# Patient Record
Sex: MALE | Race: WHITE | NOT HISPANIC OR LATINO | Employment: UNEMPLOYED | URBAN - METROPOLITAN AREA
[De-identification: names, ages, dates, MRNs, and addresses within clinical notes are randomized per-mention and may not be internally consistent; named-entity substitution may affect disease eponyms.]

---

## 2021-09-21 ENCOUNTER — OFFICE VISIT (OUTPATIENT)
Dept: FAMILY MEDICINE | Facility: CLINIC | Age: 36
End: 2021-09-21
Payer: MEDICAID

## 2021-09-21 VITALS
OXYGEN SATURATION: 98 % | SYSTOLIC BLOOD PRESSURE: 122 MMHG | DIASTOLIC BLOOD PRESSURE: 92 MMHG | BODY MASS INDEX: 25.6 KG/M2 | TEMPERATURE: 99 F | WEIGHT: 189 LBS | HEIGHT: 72 IN | HEART RATE: 110 BPM

## 2021-09-21 DIAGNOSIS — N50.89 TESTICULAR SWELLING: Primary | ICD-10-CM

## 2021-09-21 DIAGNOSIS — F32.A DEPRESSION, UNSPECIFIED DEPRESSION TYPE: ICD-10-CM

## 2021-09-21 DIAGNOSIS — R60.0 BILATERAL LOWER EXTREMITY EDEMA: ICD-10-CM

## 2021-09-21 DIAGNOSIS — F10.10 ETOH ABUSE: ICD-10-CM

## 2021-09-21 DIAGNOSIS — F51.01 PRIMARY INSOMNIA: ICD-10-CM

## 2021-09-21 DIAGNOSIS — F41.9 ANXIETY: ICD-10-CM

## 2021-09-21 PROCEDURE — 99204 PR OFFICE/OUTPT VISIT, NEW, LEVL IV, 45-59 MIN: ICD-10-PCS | Mod: S$GLB,,, | Performed by: STUDENT IN AN ORGANIZED HEALTH CARE EDUCATION/TRAINING PROGRAM

## 2021-09-21 PROCEDURE — 99204 OFFICE O/P NEW MOD 45 MIN: CPT | Mod: S$GLB,,, | Performed by: STUDENT IN AN ORGANIZED HEALTH CARE EDUCATION/TRAINING PROGRAM

## 2021-09-21 RX ORDER — ONDANSETRON HYDROCHLORIDE 8 MG/1
8 TABLET, FILM COATED ORAL EVERY 8 HOURS PRN
Status: ON HOLD | COMMUNITY
Start: 2021-09-08 | End: 2022-08-06

## 2021-09-21 RX ORDER — PANTOPRAZOLE SODIUM 40 MG/1
40 TABLET, DELAYED RELEASE ORAL DAILY
Status: ON HOLD | COMMUNITY
Start: 2021-09-08 | End: 2022-08-06

## 2021-09-22 RX ORDER — HYDROXYZINE HYDROCHLORIDE 25 MG/1
25 TABLET, FILM COATED ORAL NIGHTLY PRN
Qty: 30 TABLET | Refills: 1 | Status: SHIPPED | OUTPATIENT
Start: 2021-09-22 | End: 2021-10-05 | Stop reason: SDUPTHER

## 2021-09-22 RX ORDER — PAROXETINE 10 MG/1
10 TABLET, FILM COATED ORAL EVERY MORNING
Qty: 30 TABLET | Refills: 1 | Status: SHIPPED | OUTPATIENT
Start: 2021-09-22 | End: 2021-10-05 | Stop reason: SDUPTHER

## 2021-10-05 ENCOUNTER — OFFICE VISIT (OUTPATIENT)
Dept: FAMILY MEDICINE | Facility: CLINIC | Age: 36
End: 2021-10-05
Payer: MEDICAID

## 2021-10-05 VITALS
BODY MASS INDEX: 21.26 KG/M2 | HEIGHT: 72 IN | DIASTOLIC BLOOD PRESSURE: 76 MMHG | RESPIRATION RATE: 18 BRPM | OXYGEN SATURATION: 98 % | HEART RATE: 85 BPM | TEMPERATURE: 99 F | SYSTOLIC BLOOD PRESSURE: 124 MMHG | WEIGHT: 157 LBS

## 2021-10-05 DIAGNOSIS — Z79.899 ON LONG TERM DRUG THERAPY: ICD-10-CM

## 2021-10-05 DIAGNOSIS — F41.9 ANXIETY: ICD-10-CM

## 2021-10-05 DIAGNOSIS — Z13.220 SCREENING, LIPID: ICD-10-CM

## 2021-10-05 DIAGNOSIS — K70.30 ALCOHOLIC CIRRHOSIS, UNSPECIFIED WHETHER ASCITES PRESENT: ICD-10-CM

## 2021-10-05 DIAGNOSIS — F32.A DEPRESSION, UNSPECIFIED DEPRESSION TYPE: Primary | ICD-10-CM

## 2021-10-05 DIAGNOSIS — Z23 IMMUNIZATION DUE: ICD-10-CM

## 2021-10-05 PROCEDURE — 90471 FLU VACCINE - QUADRIVALENT (RECOMBINANT) PRESERVATIVE FREE: ICD-10-PCS | Mod: S$GLB,,, | Performed by: STUDENT IN AN ORGANIZED HEALTH CARE EDUCATION/TRAINING PROGRAM

## 2021-10-05 PROCEDURE — 99214 PR OFFICE/OUTPT VISIT, EST, LEVL IV, 30-39 MIN: ICD-10-PCS | Mod: 25,S$GLB,, | Performed by: STUDENT IN AN ORGANIZED HEALTH CARE EDUCATION/TRAINING PROGRAM

## 2021-10-05 PROCEDURE — 99214 OFFICE O/P EST MOD 30 MIN: CPT | Mod: 25,S$GLB,, | Performed by: STUDENT IN AN ORGANIZED HEALTH CARE EDUCATION/TRAINING PROGRAM

## 2021-10-05 PROCEDURE — 90682 FLU VACCINE - QUADRIVALENT (RECOMBINANT) PRESERVATIVE FREE: ICD-10-PCS | Mod: S$GLB,,, | Performed by: STUDENT IN AN ORGANIZED HEALTH CARE EDUCATION/TRAINING PROGRAM

## 2021-10-05 PROCEDURE — 90682 RIV4 VACC RECOMBINANT DNA IM: CPT | Mod: S$GLB,,, | Performed by: STUDENT IN AN ORGANIZED HEALTH CARE EDUCATION/TRAINING PROGRAM

## 2021-10-05 PROCEDURE — 90471 IMMUNIZATION ADMIN: CPT | Mod: S$GLB,,, | Performed by: STUDENT IN AN ORGANIZED HEALTH CARE EDUCATION/TRAINING PROGRAM

## 2021-10-05 RX ORDER — HYDROXYZINE HYDROCHLORIDE 25 MG/1
25 TABLET, FILM COATED ORAL 2 TIMES DAILY PRN
Qty: 60 TABLET | Refills: 3 | Status: ON HOLD | OUTPATIENT
Start: 2021-10-05 | End: 2022-08-06

## 2021-10-05 RX ORDER — PAROXETINE 10 MG/1
10 TABLET, FILM COATED ORAL EVERY MORNING
Qty: 30 TABLET | Refills: 2 | Status: ON HOLD | OUTPATIENT
Start: 2021-10-05 | End: 2022-08-06

## 2021-10-06 LAB
ABS NRBC COUNT: 0 X 10 3/UL (ref 0–0.01)
ABSOLUTE BASOPHIL: 0.07 X 10 3/UL (ref 0–0.22)
ABSOLUTE EOSINOPHIL: 0.12 X 10 3/UL (ref 0.04–0.54)
ABSOLUTE IMMATURE GRAN: 0.01 X 10 3/UL (ref 0–0.04)
ABSOLUTE LYMPHOCYTE: 0.98 X 10 3/UL (ref 0.86–4.75)
ABSOLUTE MONOCYTE: 0.35 X 10 3/UL (ref 0.22–1.08)
ALBUMIN SERPL-MCNC: 3.2 G/DL (ref 3.5–5.2)
ALBUMIN/GLOB SERPL ELPH: 0.7 {RATIO} (ref 1–2.7)
ALP ISOS SERPL LEV INH-CCNC: 128 U/L (ref 40–130)
ALT (SGPT): 33 U/L (ref 0–41)
ANION GAP SERPL CALC-SCNC: 12 MMOL/L (ref 8–17)
AST SERPL-CCNC: 78 U/L (ref 0–40)
BASOPHILS NFR BLD: 1.3 % (ref 0.2–1.2)
BILIRUBIN, TOTAL: 5.61 MG/DL (ref 0–1.2)
BUN/CREAT SERPL: 10.8 (ref 6–20)
CALCIUM SERPL-MCNC: 8.7 MG/DL (ref 8.6–10.2)
CARBON DIOXIDE, CO2: 24 MMOL/L (ref 22–29)
CHLORIDE: 103 MMOL/L (ref 98–107)
CHOLEST SERPL-MSCNC: 234 MG/DL (ref 100–200)
CREAT SERPL-MCNC: 0.74 MG/DL (ref 0.7–1.2)
EOSINOPHIL NFR BLD: 2.2 % (ref 0.7–7)
GFR ESTIMATION: 120.36
GLOBULIN: 4.3 G/DL (ref 1.5–4.5)
GLUCOSE: 87 MG/DL (ref 74–106)
HCT VFR BLD AUTO: 34.2 % (ref 42–52)
HDLC SERPL-MCNC: 77 MG/DL
HGB BLD-MCNC: 11.3 G/DL (ref 14–18)
IMMATURE GRANULOCYTES: 0.2 % (ref 0–0.5)
LDL/HDL RATIO: 1.8 (ref 1–3)
LDLC SERPL CALC-MCNC: 141 MG/DL (ref 0–100)
LYMPHOCYTES NFR BLD: 17.9 % (ref 19.3–53.1)
MCH RBC QN AUTO: 32.1 PG (ref 27–32)
MCHC RBC AUTO-ENTMCNC: 33 G/DL (ref 32–36)
MCV RBC AUTO: 97.2 FL (ref 80–94)
MONOCYTES NFR BLD: 6.4 % (ref 4.7–12.5)
NEUTROPHILS # BLD AUTO: 3.96 X 10 3/UL (ref 2.15–7.56)
NEUTROPHILS NFR BLD: 72 % (ref 34–71.1)
NUCLEATED RED BLOOD CELLS: 0 /100 WBC (ref 0–0.2)
PLATELET # BLD AUTO: 134 X 10 3/UL (ref 135–400)
POTASSIUM: 3.7 MMOL/L (ref 3.5–5.1)
PROT SNV-MCNC: 7.5 G/DL (ref 6.4–8.3)
RBC # BLD AUTO: 3.52 X 10 6/UL (ref 4.7–6.1)
RDW-SD: 64.4 FL (ref 37–54)
SODIUM: 139 MMOL/L (ref 136–145)
T4, FREE: 1.1 NG/DL (ref 0.93–1.7)
TRIGL SERPL-MCNC: 80 MG/DL (ref 0–150)
TSH SERPL DL<=0.005 MIU/L-ACNC: 4.28 UIU/ML (ref 0.27–4.2)
UREA NITROGEN (BUN): 8 MG/DL (ref 6–20)
WBC # BLD: 5.49 X 10 3/UL (ref 4.3–10.8)

## 2021-10-08 DIAGNOSIS — K70.30 ALCOHOLIC CIRRHOSIS, UNSPECIFIED WHETHER ASCITES PRESENT: Primary | ICD-10-CM

## 2022-08-04 NOTE — PROVIDER TRANSFER
Outside Transfer Acceptance Note / [facility] Referral Center    Referring facility: [facility]   Referring provider: DURAN DE LA PAZ  Accepting facility: [facility]  Accepting provider: HIMA BRADFORD  Reason for transfer: Higher Level of Care   Transfer diagnosis: acute liver failure  Transfer specialty requested: Hepatology  Transfer specialty notified: yes  Transfer level: NUMBER 1-5: 2  Bed type requested: stepdown  Isolation status: No active isolations   Admission class or status: IP- Inpatient      Narrative     36-year-old male with a history of alcohol abuse, cirrhosis, pancreatitis, and prior variceal bleed admitted to [facility] ([facility]) on August 3 with hematemesis.  He was transferred there from an outside facility after being found hypotensive with hematemesis and significant anemia (hemoglobin around 2.6).  He received packed red blood cells, FFP, Protonix infusion, and octreotide infusion.  He was transferred to [facility] where he was seen by Gastroenterology.  No hematemesis noted since he has been at [facility].  EGD showed large esophageal varices with successful band ligation.  Stomach and duodenum were unremarkable.  He remains on aztreonam, Flagyl, lactulose, octreotide infusion, Protonix, Carafate, Xifaxan.  Referring provider noted patient is awake but confused/disoriented.  He is receiving lactulose.  He is in a telemetry bed and was felt to be hemodynamically stable for ground transfer at present. Referring provider spoke with Hepatology at [facility].  Family noted patient has recent alcohol intake.  Requesting transfer for further evaluation of decompensated cirrhosis and recent GI bleed with varices.    August 4:  COVID negative  INR 4.4, ammonia 57, white blood cells 13.4, hemoglobin 7.1, hematocrit 21.9, platelets 72, , , total bilirubin 12.1, sodium 131, potassium 4.1, chloride 100, CO2 28, BUN 58, creatinine 1.38, glucose  135 August 3:  White blood cells 20, hemoglobin 6.1, hematocrit 18.7, platelets 76, INR 3.2, sodium 130, potassium 4.5, chloride 98, CO2 19, BUN 52, creatinine 1.44, glucose 120, total bilirubin 10.4, AST 1275,     Objective     Vitals:   Temperature 98°, pulse 98, respirations 20, blood pressure 105/74, oxygen saturation 99%  Allergies:   Review of patient's allergies indicates:   Allergen Reactions    Ampicillin     Ceclor [cefaclor]     Penicillins       NPO: No    Anticoagulation:   Anticoagulants     None           Instructions    1. Admit to Hospital Medicine  2. Consult Hepatology    Upon patient arrival to floor, please send SecureChat to [facility] or call extension [phone] (if no answer, do NOT leave a callback number after the beep, rather please send a SecureChat to [facility]), for Hospital Medicine admit team assignment and for additional admit orders for the patient.  Do not page the attending physician associated with the patient on arrival (this physician may not be on duty at the time of arrival).  Rather, always send a SecureChat to [facility] or call [phone] to reach the triage physician for orders and team assignment.    JALIL Balbuena MD  Hospital Medicine Staff  Cell: [phone]

## 2022-08-05 ENCOUNTER — HOSPITAL ENCOUNTER (INPATIENT)
Facility: HOSPITAL | Age: 37
LOS: 3 days | Discharge: HOME | DRG: 432 | End: 2022-08-08
Attending: INTERNAL MEDICINE | Admitting: INTERNAL MEDICINE
Payer: MEDICAID

## 2022-08-05 DIAGNOSIS — R07.9 CHEST PAIN: ICD-10-CM

## 2022-08-05 DIAGNOSIS — K72.90 DECOMPENSATION OF CIRRHOSIS OF LIVER: ICD-10-CM

## 2022-08-05 DIAGNOSIS — K74.60 DECOMPENSATION OF CIRRHOSIS OF LIVER: ICD-10-CM

## 2022-08-05 DIAGNOSIS — R94.31 PROLONGED Q-T INTERVAL ON ECG: ICD-10-CM

## 2022-08-05 PROBLEM — K76.82 HEPATIC ENCEPHALOPATHY: Status: ACTIVE | Noted: 2022-08-05

## 2022-08-05 PROBLEM — D62 ACUTE POSTHEMORRHAGIC ANEMIA: Status: ACTIVE | Noted: 2022-08-05

## 2022-08-05 PROBLEM — D68.9 COAGULOPATHY: Status: ACTIVE | Noted: 2022-08-05

## 2022-08-05 PROBLEM — D69.6 THROMBOCYTOPENIA: Status: ACTIVE | Noted: 2022-08-05

## 2022-08-05 PROBLEM — I85.01 BLEEDING ESOPHAGEAL VARICES: Status: ACTIVE | Noted: 2022-08-05

## 2022-08-05 LAB
ABO + RH BLD: NORMAL
AFP SERPL-MCNC: <2 NG/ML (ref 0–8.4)
ALBUMIN SERPL BCP-MCNC: 2.1 G/DL (ref 3.5–5.2)
ALP SERPL-CCNC: 71 U/L (ref 55–135)
ALT SERPL W/O P-5'-P-CCNC: 403 U/L (ref 10–44)
AMMONIA PLAS-SCNC: 41 UMOL/L (ref 10–50)
ANION GAP SERPL CALC-SCNC: 3 MMOL/L (ref 8–16)
AST SERPL-CCNC: 535 U/L (ref 10–40)
BASOPHILS # BLD AUTO: 0.01 K/UL (ref 0–0.2)
BASOPHILS NFR BLD: 0.1 % (ref 0–1.9)
BILIRUB SERPL-MCNC: 14.7 MG/DL (ref 0.1–1)
BLD GP AB SCN CELLS X3 SERPL QL: NORMAL
BUN SERPL-MCNC: 36 MG/DL (ref 6–20)
CALCIUM SERPL-MCNC: 7.1 MG/DL (ref 8.7–10.5)
CHLORIDE SERPL-SCNC: 104 MMOL/L (ref 95–110)
CO2 SERPL-SCNC: 28 MMOL/L (ref 23–29)
CREAT SERPL-MCNC: 1 MG/DL (ref 0.5–1.4)
DIFFERENTIAL METHOD: ABNORMAL
EOSINOPHIL # BLD AUTO: 0.1 K/UL (ref 0–0.5)
EOSINOPHIL NFR BLD: 0.9 % (ref 0–8)
ERYTHROCYTE [DISTWIDTH] IN BLOOD BY AUTOMATED COUNT: 19.9 % (ref 11.5–14.5)
EST. GFR  (NO RACE VARIABLE): >60 ML/MIN/1.73 M^2
ESTIMATED AVG GLUCOSE: 82 MG/DL (ref 68–131)
ETHANOL SERPL-MCNC: <10 MG/DL
GLUCOSE SERPL-MCNC: 105 MG/DL (ref 70–110)
HAV IGM SERPL QL IA: NEGATIVE
HBA1C MFR BLD: 4.5 % (ref 4–5.6)
HBV CORE IGM SERPL QL IA: NEGATIVE
HBV SURFACE AG SERPL QL IA: NEGATIVE
HCT VFR BLD AUTO: 22.3 % (ref 40–54)
HCV AB SERPL QL IA: NEGATIVE
HGB BLD-MCNC: 7.5 G/DL (ref 14–18)
HIV 1+2 AB+HIV1 P24 AG SERPL QL IA: NEGATIVE
IMM GRANULOCYTES # BLD AUTO: 0.04 K/UL (ref 0–0.04)
IMM GRANULOCYTES NFR BLD AUTO: 0.6 % (ref 0–0.5)
INR PPP: 2.9 (ref 0.8–1.2)
LIPASE SERPL-CCNC: 39 U/L (ref 4–60)
LYMPHOCYTES # BLD AUTO: 0.6 K/UL (ref 1–4.8)
LYMPHOCYTES NFR BLD: 8.9 % (ref 18–48)
MAGNESIUM SERPL-MCNC: 1.9 MG/DL (ref 1.6–2.6)
MCH RBC QN AUTO: 27.4 PG (ref 27–31)
MCHC RBC AUTO-ENTMCNC: 33.6 G/DL (ref 32–36)
MCV RBC AUTO: 81 FL (ref 82–98)
MONOCYTES # BLD AUTO: 0.7 K/UL (ref 0.3–1)
MONOCYTES NFR BLD: 9.8 % (ref 4–15)
NEUTROPHILS # BLD AUTO: 5.6 K/UL (ref 1.8–7.7)
NEUTROPHILS NFR BLD: 79.7 % (ref 38–73)
NRBC BLD-RTO: 0 /100 WBC
PHOSPHATE SERPL-MCNC: 1.9 MG/DL (ref 2.7–4.5)
PLATELET # BLD AUTO: 57 K/UL (ref 150–450)
PMV BLD AUTO: ABNORMAL FL (ref 9.2–12.9)
POTASSIUM SERPL-SCNC: 3.5 MMOL/L (ref 3.5–5.1)
PROCALCITONIN SERPL IA-MCNC: 0.42 NG/ML
PROT SERPL-MCNC: 4 G/DL (ref 6–8.4)
PROTHROMBIN TIME: 28.3 SEC (ref 9–12.5)
RBC # BLD AUTO: 2.74 M/UL (ref 4.6–6.2)
SODIUM SERPL-SCNC: 135 MMOL/L (ref 136–145)
WBC # BLD AUTO: 6.96 K/UL (ref 3.9–12.7)

## 2022-08-05 PROCEDURE — 82077 ASSAY SPEC XCP UR&BREATH IA: CPT | Performed by: HOSPITALIST

## 2022-08-05 PROCEDURE — 86901 BLOOD TYPING SEROLOGIC RH(D): CPT | Performed by: HOSPITALIST

## 2022-08-05 PROCEDURE — 85025 COMPLETE CBC W/AUTO DIFF WBC: CPT | Performed by: HOSPITALIST

## 2022-08-05 PROCEDURE — 82140 ASSAY OF AMMONIA: CPT | Performed by: HOSPITALIST

## 2022-08-05 PROCEDURE — 84145 PROCALCITONIN (PCT): CPT | Performed by: HOSPITALIST

## 2022-08-05 PROCEDURE — 83690 ASSAY OF LIPASE: CPT | Performed by: HOSPITALIST

## 2022-08-05 PROCEDURE — C9113 INJ PANTOPRAZOLE SODIUM, VIA: HCPCS | Performed by: HOSPITALIST

## 2022-08-05 PROCEDURE — 87040 BLOOD CULTURE FOR BACTERIA: CPT | Performed by: HOSPITALIST

## 2022-08-05 PROCEDURE — 80053 COMPREHEN METABOLIC PANEL: CPT | Performed by: HOSPITALIST

## 2022-08-05 PROCEDURE — 99223 1ST HOSP IP/OBS HIGH 75: CPT | Mod: ,,, | Performed by: HOSPITALIST

## 2022-08-05 PROCEDURE — 20600001 HC STEP DOWN PRIVATE ROOM

## 2022-08-05 PROCEDURE — 63600175 PHARM REV CODE 636 W HCPCS: Mod: JA | Performed by: STUDENT IN AN ORGANIZED HEALTH CARE EDUCATION/TRAINING PROGRAM

## 2022-08-05 PROCEDURE — 25000003 PHARM REV CODE 250: Performed by: STUDENT IN AN ORGANIZED HEALTH CARE EDUCATION/TRAINING PROGRAM

## 2022-08-05 PROCEDURE — 83036 HEMOGLOBIN GLYCOSYLATED A1C: CPT | Performed by: HOSPITALIST

## 2022-08-05 PROCEDURE — 82105 ALPHA-FETOPROTEIN SERUM: CPT | Performed by: HOSPITALIST

## 2022-08-05 PROCEDURE — 84100 ASSAY OF PHOSPHORUS: CPT | Performed by: HOSPITALIST

## 2022-08-05 PROCEDURE — 63600175 PHARM REV CODE 636 W HCPCS: Performed by: HOSPITALIST

## 2022-08-05 PROCEDURE — 87389 HIV-1 AG W/HIV-1&-2 AB AG IA: CPT | Performed by: HOSPITALIST

## 2022-08-05 PROCEDURE — 80321 ALCOHOLS BIOMARKERS 1OR 2: CPT | Performed by: HOSPITALIST

## 2022-08-05 PROCEDURE — 99223 1ST HOSP IP/OBS HIGH 75: CPT | Mod: ,,, | Performed by: STUDENT IN AN ORGANIZED HEALTH CARE EDUCATION/TRAINING PROGRAM

## 2022-08-05 PROCEDURE — 99223 PR INITIAL HOSPITAL CARE,LEVL III: ICD-10-PCS | Mod: ,,, | Performed by: STUDENT IN AN ORGANIZED HEALTH CARE EDUCATION/TRAINING PROGRAM

## 2022-08-05 PROCEDURE — 85610 PROTHROMBIN TIME: CPT | Performed by: HOSPITALIST

## 2022-08-05 PROCEDURE — 25000003 PHARM REV CODE 250: Performed by: HOSPITALIST

## 2022-08-05 PROCEDURE — 83735 ASSAY OF MAGNESIUM: CPT | Performed by: HOSPITALIST

## 2022-08-05 PROCEDURE — 99223 PR INITIAL HOSPITAL CARE,LEVL III: ICD-10-PCS | Mod: ,,, | Performed by: HOSPITALIST

## 2022-08-05 PROCEDURE — 87536 HIV-1 QUANT&REVRSE TRNSCRPJ: CPT | Performed by: HOSPITALIST

## 2022-08-05 PROCEDURE — 63600175 PHARM REV CODE 636 W HCPCS: Performed by: STUDENT IN AN ORGANIZED HEALTH CARE EDUCATION/TRAINING PROGRAM

## 2022-08-05 PROCEDURE — 93010 ELECTROCARDIOGRAM REPORT: CPT | Mod: ,,, | Performed by: INTERNAL MEDICINE

## 2022-08-05 PROCEDURE — 93005 ELECTROCARDIOGRAM TRACING: CPT

## 2022-08-05 PROCEDURE — 80074 ACUTE HEPATITIS PANEL: CPT | Performed by: HOSPITALIST

## 2022-08-05 PROCEDURE — 93010 EKG 12-LEAD: ICD-10-PCS | Mod: ,,, | Performed by: INTERNAL MEDICINE

## 2022-08-05 RX ORDER — CIPROFLOXACIN 500 MG/1
500 TABLET ORAL EVERY 12 HOURS
Status: COMPLETED | OUTPATIENT
Start: 2022-08-05 | End: 2022-08-07

## 2022-08-05 RX ORDER — THIAMINE HCL 100 MG
100 TABLET ORAL DAILY
Status: DISCONTINUED | OUTPATIENT
Start: 2022-08-05 | End: 2022-08-08 | Stop reason: HOSPADM

## 2022-08-05 RX ORDER — IPRATROPIUM BROMIDE AND ALBUTEROL SULFATE 2.5; .5 MG/3ML; MG/3ML
3 SOLUTION RESPIRATORY (INHALATION) EVERY 4 HOURS PRN
Status: DISCONTINUED | OUTPATIENT
Start: 2022-08-05 | End: 2022-08-08 | Stop reason: HOSPADM

## 2022-08-05 RX ORDER — ONDANSETRON 2 MG/ML
4 INJECTION INTRAMUSCULAR; INTRAVENOUS EVERY 8 HOURS PRN
Status: DISCONTINUED | OUTPATIENT
Start: 2022-08-05 | End: 2022-08-08 | Stop reason: HOSPADM

## 2022-08-05 RX ORDER — TALC
6 POWDER (GRAM) TOPICAL NIGHTLY PRN
Status: DISCONTINUED | OUTPATIENT
Start: 2022-08-05 | End: 2022-08-08 | Stop reason: HOSPADM

## 2022-08-05 RX ORDER — GLUCAGON 1 MG
1 KIT INJECTION
Status: DISCONTINUED | OUTPATIENT
Start: 2022-08-05 | End: 2022-08-08 | Stop reason: HOSPADM

## 2022-08-05 RX ORDER — ACETAMINOPHEN 500 MG
500 TABLET ORAL EVERY 6 HOURS PRN
Status: DISCONTINUED | OUTPATIENT
Start: 2022-08-05 | End: 2022-08-08 | Stop reason: HOSPADM

## 2022-08-05 RX ORDER — IBUPROFEN 200 MG
16 TABLET ORAL
Status: DISCONTINUED | OUTPATIENT
Start: 2022-08-05 | End: 2022-08-08 | Stop reason: HOSPADM

## 2022-08-05 RX ORDER — MAG HYDROX/ALUMINUM HYD/SIMETH 200-200-20
30 SUSPENSION, ORAL (FINAL DOSE FORM) ORAL 4 TIMES DAILY PRN
Status: DISCONTINUED | OUTPATIENT
Start: 2022-08-05 | End: 2022-08-08 | Stop reason: HOSPADM

## 2022-08-05 RX ORDER — NALOXONE HCL 0.4 MG/ML
0.02 VIAL (ML) INJECTION
Status: DISCONTINUED | OUTPATIENT
Start: 2022-08-05 | End: 2022-08-08 | Stop reason: HOSPADM

## 2022-08-05 RX ORDER — SODIUM CHLORIDE 0.9 % (FLUSH) 0.9 %
10 SYRINGE (ML) INJECTION EVERY 12 HOURS PRN
Status: DISCONTINUED | OUTPATIENT
Start: 2022-08-05 | End: 2022-08-08 | Stop reason: HOSPADM

## 2022-08-05 RX ORDER — IBUPROFEN 200 MG
24 TABLET ORAL
Status: DISCONTINUED | OUTPATIENT
Start: 2022-08-05 | End: 2022-08-08 | Stop reason: HOSPADM

## 2022-08-05 RX ORDER — FUROSEMIDE 20 MG/1
20 TABLET ORAL DAILY
Status: DISCONTINUED | OUTPATIENT
Start: 2022-08-05 | End: 2022-08-05

## 2022-08-05 RX ORDER — SODIUM,POTASSIUM PHOSPHATES 280-250MG
2 POWDER IN PACKET (EA) ORAL
Status: COMPLETED | OUTPATIENT
Start: 2022-08-05 | End: 2022-08-05

## 2022-08-05 RX ORDER — SPIRONOLACTONE 25 MG/1
50 TABLET ORAL DAILY
Status: DISCONTINUED | OUTPATIENT
Start: 2022-08-05 | End: 2022-08-05

## 2022-08-05 RX ORDER — PROPRANOLOL HYDROCHLORIDE 10 MG/1
10 TABLET ORAL 2 TIMES DAILY
Status: DISCONTINUED | OUTPATIENT
Start: 2022-08-06 | End: 2022-08-08 | Stop reason: HOSPADM

## 2022-08-05 RX ORDER — CIPROFLOXACIN 2 MG/ML
400 INJECTION, SOLUTION INTRAVENOUS
Status: DISCONTINUED | OUTPATIENT
Start: 2022-08-05 | End: 2022-08-05

## 2022-08-05 RX ORDER — LACTULOSE 10 G/15ML
10 SOLUTION ORAL 3 TIMES DAILY
Status: DISCONTINUED | OUTPATIENT
Start: 2022-08-05 | End: 2022-08-08 | Stop reason: HOSPADM

## 2022-08-05 RX ORDER — PANTOPRAZOLE SODIUM 40 MG/10ML
40 INJECTION, POWDER, LYOPHILIZED, FOR SOLUTION INTRAVENOUS 2 TIMES DAILY
Status: DISCONTINUED | OUTPATIENT
Start: 2022-08-05 | End: 2022-08-05

## 2022-08-05 RX ORDER — PANTOPRAZOLE SODIUM 40 MG/1
40 TABLET, DELAYED RELEASE ORAL DAILY
Status: DISCONTINUED | OUTPATIENT
Start: 2022-08-06 | End: 2022-08-08 | Stop reason: HOSPADM

## 2022-08-05 RX ORDER — FOLIC ACID 1 MG/1
1 TABLET ORAL DAILY
Status: DISCONTINUED | OUTPATIENT
Start: 2022-08-05 | End: 2022-08-08 | Stop reason: HOSPADM

## 2022-08-05 RX ADMIN — PANTOPRAZOLE SODIUM 40 MG: 40 INJECTION, POWDER, FOR SOLUTION INTRAVENOUS at 08:08

## 2022-08-05 RX ADMIN — Medication 100 MG: at 08:08

## 2022-08-05 RX ADMIN — PHYTONADIONE 10 MG: 10 INJECTION, EMULSION INTRAMUSCULAR; INTRAVENOUS; SUBCUTANEOUS at 10:08

## 2022-08-05 RX ADMIN — LACTULOSE 10 G: 20 SOLUTION ORAL at 08:08

## 2022-08-05 RX ADMIN — OCTREOTIDE ACETATE 50 MCG/HR: 500 INJECTION, SOLUTION INTRAVENOUS; SUBCUTANEOUS at 01:08

## 2022-08-05 RX ADMIN — LACTULOSE 10 G: 20 SOLUTION ORAL at 03:08

## 2022-08-05 RX ADMIN — POTASSIUM & SODIUM PHOSPHATES POWDER PACK 280-160-250 MG 2 PACKET: 280-160-250 PACK at 12:08

## 2022-08-05 RX ADMIN — CIPROFLOXACIN 500 MG: 500 TABLET, FILM COATED ORAL at 09:08

## 2022-08-05 RX ADMIN — POTASSIUM & SODIUM PHOSPHATES POWDER PACK 280-160-250 MG 2 PACKET: 280-160-250 PACK at 03:08

## 2022-08-05 RX ADMIN — FOLIC ACID 1 MG: 1 TABLET ORAL at 08:08

## 2022-08-05 RX ADMIN — OCTREOTIDE ACETATE 50 MCG/HR: 500 INJECTION, SOLUTION INTRAVENOUS; SUBCUTANEOUS at 05:08

## 2022-08-05 RX ADMIN — CIPROFLOXACIN 400 MG: 2 INJECTION, SOLUTION INTRAVENOUS at 09:08

## 2022-08-05 RX ADMIN — POTASSIUM & SODIUM PHOSPHATES POWDER PACK 280-160-250 MG 2 PACKET: 280-160-250 PACK at 09:08

## 2022-08-05 NOTE — SUBJECTIVE & OBJECTIVE
Past Medical History:   Diagnosis Date    GERD (gastroesophageal reflux disease)     History of alcohol abuse        Past Surgical History:   Procedure Laterality Date    EGD, WITH BANDING OF VARICES         Review of patient's allergies indicates:   Allergen Reactions    Ampicillin     Ceclor [cefaclor]     Penicillins        Current Facility-Administered Medications on File Prior to Encounter   Medication    [DISCONTINUED] calcium carbonate 200 mg calcium (500 mg) chewable tablet    [DISCONTINUED] diazePAM injection    [DISCONTINUED] diazePAM tablet    [DISCONTINUED] GENERIC EXTERNAL MEDICATION    [DISCONTINUED] GENERIC EXTERNAL MEDICATION    [DISCONTINUED] GENERIC EXTERNAL MEDICATION    [DISCONTINUED] GENERIC EXTERNAL MEDICATION    [DISCONTINUED] GENERIC EXTERNAL MEDICATION    [DISCONTINUED] GENERIC EXTERNAL MEDICATION    [DISCONTINUED] GENERIC EXTERNAL MEDICATION    [DISCONTINUED] GENERIC EXTERNAL MEDICATION    [DISCONTINUED] labetalol 20 mg/4 mL (5 mg/mL) IV syring    [DISCONTINUED] lactulose 10 gram/15 mL solution    [DISCONTINUED] metronidazole IVPB    [DISCONTINUED] morphine injection    [DISCONTINUED] naloxone injection    [DISCONTINUED] naloxone injection    [DISCONTINUED] oxyCODONE-acetaminophen 5-325 mg per tablet    [DISCONTINUED] pantoprazole injection    [DISCONTINUED] rifAXIMin tablet    [DISCONTINUED] sucralfate 100 mg/mL suspension     Current Outpatient Medications on File Prior to Encounter   Medication Sig    hydrOXYzine HCL (ATARAX) 25 MG tablet Take 1 tablet (25 mg total) by mouth 2 (two) times daily as needed for Anxiety (insomnia).    ondansetron (ZOFRAN) 8 MG tablet Take 8 mg by mouth every 8 (eight) hours as needed.    pantoprazole (PROTONIX) 40 MG tablet Take 40 mg by mouth once daily.    paroxetine (PAXIL) 10 MG tablet Take 1 tablet (10 mg total) by mouth every morning.     Family History       Family history is unknown by patient.          Tobacco Use    Smoking status: Current Every  Day Smoker     Types: Cigarettes    Smokeless tobacco: Never Used   Substance and Sexual Activity    Alcohol use: Yes     Comment: pint of vodka every day     Drug use: Yes     Types: Marijuana    Sexual activity: Not Currently     Review of Systems   Constitutional:  Positive for fatigue. Negative for activity change, appetite change, chills, diaphoresis, fever and unexpected weight change.   HENT:  Negative for congestion, dental problem, drooling, ear discharge, ear pain, facial swelling, hearing loss, mouth sores, nosebleeds, postnasal drip, rhinorrhea, sinus pressure, sneezing, sore throat, tinnitus, trouble swallowing and voice change.    Eyes:  Negative for photophobia, pain, discharge, redness, itching and visual disturbance.   Respiratory:  Negative for apnea, cough, choking, chest tightness, shortness of breath, wheezing and stridor.    Cardiovascular:  Negative for chest pain, palpitations and leg swelling.   Gastrointestinal:  Positive for abdominal distention (mild abdominal distention), abdominal pain (epigastric pain) and vomiting. Negative for anal bleeding, blood in stool, constipation, diarrhea, nausea and rectal pain.        Hematemesis    Endocrine: Negative for cold intolerance, heat intolerance, polydipsia, polyphagia and polyuria.   Genitourinary:  Negative for decreased urine volume, difficulty urinating, dysuria, enuresis, flank pain, frequency, genital sores, hematuria, penile discharge, penile pain, penile swelling, scrotal swelling, testicular pain and urgency.   Musculoskeletal:  Negative for arthralgias, back pain, gait problem, joint swelling, myalgias, neck pain and neck stiffness.   Skin:  Positive for color change (jaundice). Negative for pallor, rash and wound.   Allergic/Immunologic: Negative for environmental allergies, food allergies and immunocompromised state.   Neurological:  Positive for weakness. Negative for dizziness, tremors, seizures, syncope, facial asymmetry, speech  difficulty, light-headedness, numbness and headaches.   Hematological:  Negative for adenopathy. Does not bruise/bleed easily.   Psychiatric/Behavioral:  Negative for agitation, behavioral problems, confusion, decreased concentration, dysphoric mood, hallucinations, self-injury, sleep disturbance and suicidal ideas. The patient is not nervous/anxious and is not hyperactive.    Objective:     Vital Signs (Most Recent):  Temp: 98.5 °F (36.9 °C) (08/05/22 0637)  Pulse: 71 (08/05/22 0637)  Resp: 16 (08/05/22 0637)  BP: 121/61 (08/05/22 0637)  SpO2: 100 % (08/05/22 0637) Vital Signs (24h Range):  Temp:  [97.8 °F (36.6 °C)-98.5 °F (36.9 °C)] 98.5 °F (36.9 °C)  Pulse:  [] 71  Resp:  [16-20] 16  SpO2:  [96 %-100 %] 100 %  BP: (111-121)/(56-61) 121/61     Weight: 70.4 kg (155 lb 3.3 oz)  Body mass index is 21.05 kg/m².    Physical Exam  Constitutional:       General: He is not in acute distress.     Appearance: He is well-developed. He is ill-appearing. He is not diaphoretic.   HENT:      Head: Normocephalic and atraumatic.      Nose: Nose normal.      Mouth/Throat:      Pharynx: No oropharyngeal exudate.   Eyes:      General: Scleral icterus present.      Extraocular Movements: EOM normal.      Conjunctiva/sclera: Conjunctivae normal.      Pupils: Pupils are equal, round, and reactive to light.   Neck:      Thyroid: No thyromegaly.      Vascular: No JVD.      Trachea: No tracheal deviation.   Cardiovascular:      Rate and Rhythm: Normal rate and regular rhythm.      Heart sounds: Normal heart sounds. No murmur heard.  Pulmonary:      Effort: Pulmonary effort is normal. No respiratory distress.      Breath sounds: Normal breath sounds. No wheezing or rales.   Chest:      Chest wall: No tenderness.   Abdominal:      General: Bowel sounds are normal. There is distension (mild abdominal distention).      Palpations: Abdomen is soft. There is no mass.      Tenderness: There is no abdominal tenderness. There is no guarding  or rebound.   Musculoskeletal:         General: No tenderness. Normal range of motion.      Cervical back: Normal range of motion and neck supple.   Lymphadenopathy:      Cervical: No cervical adenopathy.   Skin:     General: Skin is warm and dry.      Coloration: Skin is jaundiced.      Findings: No erythema or rash.   Neurological:      Mental Status: He is alert and oriented to person, place, and time.      Cranial Nerves: No cranial nerve deficit.      Motor: No abnormal muscle tone.      Coordination: Coordination normal.      Deep Tendon Reflexes: Reflexes are normal and symmetric. Reflexes normal.      Comments: Positive asterixis    Psychiatric:         Thought Content: Thought content normal.         Judgment: Judgment normal.         CRANIAL NERVES     CN III, IV, VI   Pupils are equal, round, and reactive to light.  Extraocular motions are normal.      Significant Labs: All pertinent labs within the past 24 hours have been reviewed.  None    Recent Results (from the past 24 hour(s))   SARS-COV-2 (COVID-19) QUALITATIVE PCR    Collection Time: 08/04/22 12:50 PM   Result Value Ref Range    SARS-CoV2 (COVID-19) Qualitative PCR Presumptive Negative Presumptive Negative, see note   Comprehensive Metabolic Panel    Collection Time: 08/05/22  5:27 AM   Result Value Ref Range    Sodium 135 (L) 136 - 145 mmol/L    Potassium 3.5 3.5 - 5.1 mmol/L    Chloride 104 95 - 110 mmol/L    CO2 28 23 - 29 mmol/L    Glucose 105 70 - 110 mg/dL    BUN 36 (H) 6 - 20 mg/dL    Creatinine 1.0 0.5 - 1.4 mg/dL    Calcium 7.1 (L) 8.7 - 10.5 mg/dL    Total Protein 4.0 (L) 6.0 - 8.4 g/dL    Albumin 2.1 (L) 3.5 - 5.2 g/dL    Total Bilirubin 14.7 (H) 0.1 - 1.0 mg/dL    Alkaline Phosphatase 71 55 - 135 U/L     (H) 10 - 40 U/L     (H) 10 - 44 U/L    Anion Gap 3 (L) 8 - 16 mmol/L    eGFR >60.0 >60 mL/min/1.73 m^2   Magnesium    Collection Time: 08/05/22  5:27 AM   Result Value Ref Range    Magnesium 1.9 1.6 - 2.6 mg/dL    Phosphorus    Collection Time: 08/05/22  5:27 AM   Result Value Ref Range    Phosphorus 1.9 (L) 2.7 - 4.5 mg/dL   Type & Screen    Collection Time: 08/05/22  5:27 AM   Result Value Ref Range    Group & Rh A POS     Indirect Maricruz NEG    Ammonia    Collection Time: 08/05/22  5:27 AM   Result Value Ref Range    Ammonia 41 10 - 50 umol/L   Lipase    Collection Time: 08/05/22  5:27 AM   Result Value Ref Range    Lipase 39 4 - 60 U/L   Hemoglobin A1c    Collection Time: 08/05/22  5:27 AM   Result Value Ref Range    Hemoglobin A1C 4.5 4.0 - 5.6 %    Estimated Avg Glucose 82 68 - 131 mg/dL   CBC Auto Differential    Collection Time: 08/05/22  5:28 AM   Result Value Ref Range    WBC 6.96 3.90 - 12.70 K/uL    RBC 2.74 (L) 4.60 - 6.20 M/uL    Hemoglobin 7.5 (L) 14.0 - 18.0 g/dL    Hematocrit 22.3 (L) 40.0 - 54.0 %    MCV 81 (L) 82 - 98 fL    MCH 27.4 27.0 - 31.0 pg    MCHC 33.6 32.0 - 36.0 g/dL    RDW 19.9 (H) 11.5 - 14.5 %    Platelets 57 (L) 150 - 450 K/uL    MPV SEE COMMENT 9.2 - 12.9 fL    Immature Granulocytes 0.6 (H) 0.0 - 0.5 %    Gran # (ANC) 5.6 1.8 - 7.7 K/uL    Immature Grans (Abs) 0.04 0.00 - 0.04 K/uL    Lymph # 0.6 (L) 1.0 - 4.8 K/uL    Mono # 0.7 0.3 - 1.0 K/uL    Eos # 0.1 0.0 - 0.5 K/uL    Baso # 0.01 0.00 - 0.20 K/uL    nRBC 0 0 /100 WBC    Gran % 79.7 (H) 38.0 - 73.0 %    Lymph % 8.9 (L) 18.0 - 48.0 %    Mono % 9.8 4.0 - 15.0 %    Eosinophil % 0.9 0.0 - 8.0 %    Basophil % 0.1 0.0 - 1.9 %    Differential Method Automated    Protime-INR    Collection Time: 08/05/22  5:28 AM   Result Value Ref Range    Prothrombin Time 28.3 (H) 9.0 - 12.5 sec    INR 2.9 (H) 0.8 - 1.2   Ethanol    Collection Time: 08/05/22  5:28 AM   Result Value Ref Range    Alcohol, Serum <10 <10 mg/dL         Significant Imaging: I have reviewed all pertinent imaging results/findings within the past 24 hours.

## 2022-08-05 NOTE — PLAN OF CARE
[facility] - Telemetry Stepdown  Initial Discharge Assessment       Primary Care Provider: Primary Doctor No    Admission Diagnosis: Decompensation of cirrhosis of liver [K72.90, K74.60]    Admission Date: 8/5/2022  Expected Discharge Date: 8/9/2022    Discharge Barriers Identified: None    Payor: MEDICAID / Plan: [place] [employer] / Product Type: Managed Medicaid /     Extended Emergency Contact Information  Primary Emergency Contact: Gloria Lenz  Mobile Phone: [phone]  Relation: Daughter  Secondary Emergency Contact: taylor lenz  Mobile Phone: [phone]  Relation: Sister    Discharge Plan A: Home with family  Discharge Plan B: Home with family      [id number] - [place], [place] - [address] AT [facility]  [address]  [place] [address]  Phone: [phone] Fax: [phone]      Initial Assessment (most recent)     Adult Discharge Assessment - 08/05/22 1518        Discharge Assessment    Assessment Type Discharge Planning Assessment     Confirmed/corrected address, phone number and insurance Yes     Confirmed Demographics Correct on Facesheet     Source of Information patient     Communicated ENIO with patient/caregiver Yes     Reason For Admission Cirrhosis     Lives With parent(s);sibling(s)     Facility Arrived From: Transfer from [facility] in [place]     Do you expect to return to your current living situation? Yes     Do you have help at home or someone to help you manage your care at home? Yes     Who are your caregiver(s) and their phone number(s)? Taylor Lenz (sister) [phone]     Prior to hospitilization cognitive status: Alert/Oriented     Current cognitive status: Alert/Oriented     Walking or Climbing Stairs Difficulty none     Dressing/Bathing Difficulty none     Equipment Currently Used at Home none     Readmission within 30 days? No     Patient currently being followed by outpatient case management? No     Do you currently have  service(s) that help you manage your care at home? No     Do you take prescription medications? Yes     Do you have prescription coverage? Yes     How do you get to doctors appointments? family or friend will provide;car, drives self     Are you on dialysis? No     Do you take coumadin? No     Discharge Plan A Home with family     Discharge Plan B Home with family     DME Needed Upon Discharge  none     Discharge Plan discussed with: Patient     Discharge Barriers Identified None                 Sneha Carrizales RN  Ext [phone]

## 2022-08-05 NOTE — CONSULTS
"[facility]  Gastroenterology  Consult Note    Patient Name: Porfirio Wilson  MRN: [id number]  Admission Date: 8/5/2022  Hospital Length of Stay: 0 days  Code Status: Full Code   Attending Provider: Taryn Linares MD   Consulting Provider: Sanjiv Rodriguez MD  Primary Care Physician: Dayday Mckeon MD  Principal Problem:Bleeding esophageal varices    Inpatient consult to Hepatology  Consult performed by: Sanjiv Rodriguez MD  Consult ordered by: Sisi Noble DO        Subjective:     HPI: Porfirio Wilson is a 36 y.o. male with history of alcohol abuse, suspected EtOH-related cirrhosis with esophageal varices with 2 variceal bleeds, and h/o 1 episode of pancreatitis who was transferred from [facility] for management of acute decompensated alcoholic liver cirrhosis.Pt presented to the OSH after having melena for a few days and then an episode of hematemesis on the day of admission. Labs noted for Hgb of 2.6 with a MELD of 34, AST/ALT of around 1200/400, T. Bili of 10.4, INR of 3.2, Cr of 1.44, albumin of 2.5.  He received total 5 units of packed red blood cells, 1 of FFP, Protonix infusion, and octreotide infusion with no further hematemesis since admission. Underwent an EGD which showed large esophageal varices with successful band ligation, but otherwise unremarkable.  He was treated with aztreonam, Flagyl, lactulose, octreotide infusion, Protonix, Carafate, Xifaxan.      On arrival here, labs significant for T. Bili of 14.7, AST/ALT of around 500/400, Cr of 1. MELD 30.  Pt reports having tried rehab(inpatient and outpatient) a few times since initially told of liver problems with intermittent periods of sobriety.  He states that he was never told he had cirrhosis but was told of being "pre-cirrhotic" after his initial hematemesis episode, which pt cannot recall when it happened. He reports having a possible liver biopsy done at [facility] within the last year but unclear of the " results. Pt reports last drink of EtOH was a few days prior to admission, with pt stating he has been drinking a fifth of heavy alcohol daily for the last 10 years. Denies any tattoos or hepatitis exposure but does state having hepatitis A when growing up. Denies any prior surgeries or FHx of liver disease.         Past Medical History:   Diagnosis Date    GERD (gastroesophageal reflux disease)     History of alcohol abuse        Past Surgical History:   Procedure Laterality Date    EGD, WITH BANDING OF VARICES         Family History   Family history unknown: Yes       Social History     Socioeconomic History    Marital status: Single   Tobacco Use    Smoking status: Current Every Day Smoker     Types: Cigarettes    Smokeless tobacco: Never Used   Substance and Sexual Activity    Alcohol use: Yes     Comment: pint of vodka every day     Drug use: Yes     Types: Marijuana    Sexual activity: Not Currently       Current Facility-Administered Medications on File Prior to Encounter   Medication Dose Route Frequency Provider Last Rate Last Admin    [DISCONTINUED] calcium carbonate 200 mg calcium (500 mg) chewable tablet   Oral QID PRN Generic External Data Provider        [DISCONTINUED] diazePAM injection  5 mg Intravenous Q4H PRN Generic External Data Provider        [DISCONTINUED] diazePAM tablet  5 mg Oral Q12H PRN Generic External Data Provider        [DISCONTINUED] GENERIC EXTERNAL MEDICATION     Generic External Data Provider        [DISCONTINUED] GENERIC EXTERNAL MEDICATION     Generic External Data Provider        [DISCONTINUED] GENERIC EXTERNAL MEDICATION     Generic External Data Provider        [DISCONTINUED] GENERIC EXTERNAL MEDICATION  2 g Intravenous  Generic External Data Provider        [DISCONTINUED] GENERIC EXTERNAL MEDICATION  25 mcg/hr Intravenous  Generic External Data Provider        [DISCONTINUED] GENERIC EXTERNAL MEDICATION     Generic External Data Provider         [DISCONTINUED] GENERIC EXTERNAL MEDICATION     Generic External Data Provider        [DISCONTINUED] GENERIC EXTERNAL MEDICATION     Generic External Data Provider        [DISCONTINUED] labetalol 20 mg/4 mL (5 mg/mL) IV syring  10 mg Intravenous Q4H PRN Generic External Data Provider        [DISCONTINUED] lactulose 10 gram/15 mL solution  30 mL Oral  Generic External Data Provider        [DISCONTINUED] metronidazole IVPB  500 mg Intravenous  Generic External Data Provider        [DISCONTINUED] morphine injection  2 mg Intravenous Q2H PRN Generic External Data Provider        [DISCONTINUED] naloxone injection  0.1 mg Intravenous  Generic External Data Provider        [DISCONTINUED] naloxone injection  0.4 mg Intravenous  Generic External Data Provider        [DISCONTINUED] oxyCODONE-acetaminophen 5-325 mg per tablet  1 tablet Oral Q6H PRN Generic External Data Provider        [DISCONTINUED] pantoprazole injection  40 mg Intravenous  Generic External Data Provider        [DISCONTINUED] rifAXIMin tablet  550 mg Oral  Generic External Data Provider        [DISCONTINUED] sucralfate 100 mg/mL suspension  1 g Oral  Generic External Data Provider         Current Outpatient Medications on File Prior to Encounter   Medication Sig Dispense Refill    hydrOXYzine HCL (ATARAX) 25 MG tablet Take 1 tablet (25 mg total) by mouth 2 (two) times daily as needed for Anxiety (insomnia). 60 tablet 3    ondansetron (ZOFRAN) 8 MG tablet Take 8 mg by mouth every 8 (eight) hours as needed.      pantoprazole (PROTONIX) 40 MG tablet Take 40 mg by mouth once daily.      paroxetine (PAXIL) 10 MG tablet Take 1 tablet (10 mg total) by mouth every morning. 30 tablet 2       Review of patient's allergies indicates:   Allergen Reactions    Ampicillin     Ceclor [cefaclor]     Penicillins        Review of Systems   Constitutional: Negative for diaphoresis, fever and weight loss.   HENT: Negative for congestion and sore throat.     Respiratory: Negative for cough and shortness of breath.    Cardiovascular: Negative for chest pain and palpitations.   Gastrointestinal: Positive for abdominal pain. Negative for blood in stool, constipation, diarrhea, melena, nausea and vomiting.   Genitourinary: Negative for dysuria and flank pain.   Musculoskeletal: Negative for joint pain and myalgias.   Neurological: Negative for weakness and headaches.        Objective:     Vitals:    08/05/22 0820   BP: 112/67   Pulse: 80   Resp: 20   Temp: 97.8 °F (36.6 °C)         Constitutional:  not in acute distress and well developed  HENT: Head: Normal, normocephalic, atraumatic.  Eyes: conjunctiva clear and sclera icteric  Cardiovascular: regular rate and rhythm  Respiratory: normal chest expansion & respiratory effort    GI: soft, mild epigastric tenderness, without masses or organomegaly  Musculoskeletal: no muscular tenderness noted  Skin: no edema  Neurological: alert, oriented x3. Positive for asterixis  Psychiatric: oriented to time, place and person. Not a great historian      Significant Labs:  Recent Labs   Lab 08/05/22 0528   HGB 7.5*       Lab Results   Component Value Date    WBC 6.96 08/05/2022    HGB 7.5 (L) 08/05/2022    HCT 22.3 (L) 08/05/2022    MCV 81 (L) 08/05/2022    PLT 57 (L) 08/05/2022       Lab Results   Component Value Date     (L) 08/05/2022    K 3.5 08/05/2022     08/05/2022    CO2 28 08/05/2022    BUN 36 (H) 08/05/2022    CREATININE 1.0 08/05/2022    CALCIUM 7.1 (L) 08/05/2022    ANIONGAP 3 (L) 08/05/2022       Lab Results   Component Value Date     (H) 08/05/2022     (H) 08/05/2022    ALKPHOS 71 08/05/2022    BILITOT 14.7 (H) 08/05/2022       Lab Results   Component Value Date    INR 2.9 (H) 08/05/2022       Significant Imaging:  Reviewed pertinent radiology findings.       Assessment/Plan:     Porfirio Wilson is a 36 y.o. male with history of suspected EtOH-related cirrhosis with associated variceal bleeds(now  3 episodes with banding during this recent episode) and 1 episode of pancreatitis      Presumed EtOH-related cirrhosis with h/o esophageal varices   Esophageal variceal bleed(x3) with banding during this admission  H/o pancreatitis(x1)  -Liver US ordered, pending  -Continue folic acid, thiamine  -Continue vitamin K 10mg daily for 3 doses  -Continue octreotide for 72 hours, cipro for 5 days. May need SBP prophylaxis on discharge, will discuss  -Continue PPI  -Consult addiction psych, pt willing to undergo rehab again  -Start propanolol 10mg BID, titrate to goal HR of 55-60  -recommend hemolytic anemia workup as indirect bili from hep panel on 8/3 was 6.5  -Recommend ordering chronic liver disease panel including:   - Anti Mitochondrial Antibody  - Anti Smooth Muscle Antibody  - Celiac Panel  - Iron Panel, including ferritin and transferrin  - Ceruloplasmin  - IgG4  - IgA  - CHRISTEL  - Hep A IgM and IgG  - Hep B sAb, sAg  - Hep B total core antibody  - Hep E Ab  - Alpha-1-Antitrypsin       Thank you for involving us in the care of Porfirio Wilson. Please call with any additional questions, concerns or changes in the patient's clinical status.     Sanjiv Rodriguez MD  Gastroenterology Fellow PGY IV  [facility]

## 2022-08-05 NOTE — HPI
Mr. Wilson is a 36-year-old male with a history of alcohol abuse, cirrhosis, pancreatitis, and prior variceal bleed who presents as a transfer from [facility] for management of acute decompensated alcoholic liver cirrhosis and bleeding of esophageal varices. Patient was admitted to outside hospital on August 3 with hematemesis.  He was transferred there from [facility] after being found hypotensive with hematemesis and significant anemia (hemoglobin around 2.6).  He received total 5 units of packed red blood cells, FFP, Protonix infusion, and octreotide infusion.  He was transferred to [facility] where he was seen by Gastroenterology.  No hematemesis noted since he has been at [facility].  EGD showed large esophageal varices with successful band ligation.  Stomach and duodenum were unremarkable.  He was treated with aztreonam, Flagyl, lactulose, octreotide infusion, Protonix, Carafate, Xifaxan.  Referring provider noted patient is awake but confused/disoriented.         August 4:  COVID negative  INR 4.4, ammonia 57, white blood cells 13.4, hemoglobin 7.1, hematocrit 21.9, platelets 72, , , total bilirubin 12.1, sodium 131, potassium 4.1, chloride 100, CO2 28, BUN 58, creatinine 1.38, glucose 135     August 3:  White blood cells 20, hemoglobin 6.1, hematocrit 18.7, platelets 76, INR 3.2, sodium 130, potassium 4.5, chloride 98, CO2 19, BUN 52, creatinine 1.44, glucose 120, total bilirubin 10.4, AST 1275,     During my interview upon arrival to [facility] patient is awake, conversant and not in distress. He is afebrile and oxygenating well on room air. Patient denies any further bleeding episodes since EGD at outside hospital. Patient states he has been drinking up to fifth of vodka daily until 6 weeks ago and since he has been drinking mountain dew instead of alcohol. He lives at home with his mother and sister. He reports working as a . Patient smokes cigarettes  and marijuana occasionally but denies h/o IVDU or other illicit drug use.

## 2022-08-05 NOTE — H&P
[facility]  History & Physical    Patient Name: Porfirio Wilson  MRN: [id number]  Patient Class: IP- Inpatient  Admission Date: 8/5/2022  Attending Physician: Taryn Linares MD   Primary Care Provider: Dayday Mckeon MD         Patient information was obtained from patient and outside records .     Subjective:     Principal Problem:Bleeding esophageal varices    Chief Complaint: No chief complaint on file.       HPI: Mr. Wilson is a 36-year-old male with a history of alcohol abuse, cirrhosis, pancreatitis, and prior variceal bleed who presents as a transfer from [facility] for management of acute decompensated alcoholic liver cirrhosis and bleeding of esophageal varices. Patient was admitted to outside hospital on August 3 with hematemesis.  He was transferred there from [facility] after being found hypotensive with hematemesis and significant anemia (hemoglobin around 2.6).  He received total 5 units of packed red blood cells, FFP, Protonix infusion, and octreotide infusion.  He was transferred to [facility] where he was seen by Gastroenterology.  No hematemesis noted since he has been at [facility].  EGD showed large esophageal varices with successful band ligation.  Stomach and duodenum were unremarkable.  He was treated with aztreonam, Flagyl, lactulose, octreotide infusion, Protonix, Carafate, Xifaxan.  Referring provider noted patient is awake but confused/disoriented.         August 4:  COVID negative  INR 4.4, ammonia 57, white blood cells 13.4, hemoglobin 7.1, hematocrit 21.9, platelets 72, , , total bilirubin 12.1, sodium 131, potassium 4.1, chloride 100, CO2 28, BUN 58, creatinine 1.38, glucose 135     August 3:  White blood cells 20, hemoglobin 6.1, hematocrit 18.7, platelets 76, INR 3.2, sodium 130, potassium 4.5, chloride 98, CO2 19, BUN 52, creatinine 1.44, glucose 120, total bilirubin 10.4, AST 1275, ALT  400    During my interview upon arrival to [facility] patient is awake, conversant and not in distress. He is afebrile and oxygenating well on room air. Patient denies any further bleeding episodes since EGD at outside hospital. Patient states he has been drinking up to fifth of vodka daily until 6 weeks ago and since he has been drinking mountain dew instead of alcohol. He lives at home with his mother and sister. He reports working as a . Patient smokes cigarettes and marijuana occasionally but denies h/o IVDU or other illicit drug use.       Past Medical History:   Diagnosis Date    GERD (gastroesophageal reflux disease)     History of alcohol abuse        Past Surgical History:   Procedure Laterality Date    EGD, WITH BANDING OF VARICES         Review of patient's allergies indicates:   Allergen Reactions    Ampicillin     Ceclor [cefaclor]     Penicillins        Current Facility-Administered Medications on File Prior to Encounter   Medication    [DISCONTINUED] calcium carbonate 200 mg calcium (500 mg) chewable tablet    [DISCONTINUED] diazePAM injection    [DISCONTINUED] diazePAM tablet    [DISCONTINUED] GENERIC EXTERNAL MEDICATION    [DISCONTINUED] GENERIC EXTERNAL MEDICATION    [DISCONTINUED] GENERIC EXTERNAL MEDICATION    [DISCONTINUED] GENERIC EXTERNAL MEDICATION    [DISCONTINUED] GENERIC EXTERNAL MEDICATION    [DISCONTINUED] GENERIC EXTERNAL MEDICATION    [DISCONTINUED] GENERIC EXTERNAL MEDICATION    [DISCONTINUED] GENERIC EXTERNAL MEDICATION    [DISCONTINUED] labetalol 20 mg/4 mL (5 mg/mL) IV syring    [DISCONTINUED] lactulose 10 gram/15 mL solution    [DISCONTINUED] metronidazole IVPB    [DISCONTINUED] morphine injection    [DISCONTINUED] naloxone injection    [DISCONTINUED] naloxone injection    [DISCONTINUED] oxyCODONE-acetaminophen 5-325 mg per tablet    [DISCONTINUED] pantoprazole injection    [DISCONTINUED] rifAXIMin tablet    [DISCONTINUED] sucralfate 100 mg/mL suspension      Current Outpatient Medications on File Prior to Encounter   Medication Sig    hydrOXYzine HCL (ATARAX) 25 MG tablet Take 1 tablet (25 mg total) by mouth 2 (two) times daily as needed for Anxiety (insomnia).    ondansetron (ZOFRAN) 8 MG tablet Take 8 mg by mouth every 8 (eight) hours as needed.    pantoprazole (PROTONIX) 40 MG tablet Take 40 mg by mouth once daily.    paroxetine (PAXIL) 10 MG tablet Take 1 tablet (10 mg total) by mouth every morning.     Family History       Father with alcoholism and . Mother with ovarian cancer and alive.           Tobacco Use    Smoking status: Current Every Day Smoker     Types: Cigarettes    Smokeless tobacco: Never Used   Substance and Sexual Activity    Alcohol use: Yes     Comment: pint of vodka every day     Drug use: Yes     Types: Marijuana    Sexual activity: Not Currently     Review of Systems   Constitutional:  Positive for fatigue. Negative for activity change, appetite change, chills, diaphoresis, fever and unexpected weight change.   HENT:  Negative for congestion, dental problem, drooling, ear discharge, ear pain, facial swelling, hearing loss, mouth sores, nosebleeds, postnasal drip, rhinorrhea, sinus pressure, sneezing, sore throat, tinnitus, trouble swallowing and voice change.    Eyes:  Negative for photophobia, pain, discharge, redness, itching and visual disturbance.   Respiratory:  Negative for apnea, cough, choking, chest tightness, shortness of breath, wheezing and stridor.    Cardiovascular:  Negative for chest pain, palpitations and leg swelling.   Gastrointestinal:  Positive for abdominal distention (mild abdominal distention), abdominal pain (epigastric pain) and vomiting. Negative for anal bleeding, blood in stool, constipation, diarrhea, nausea and rectal pain.        Hematemesis    Endocrine: Negative for cold intolerance, heat intolerance, polydipsia, polyphagia and polyuria.   Genitourinary:  Negative for decreased urine  volume, difficulty urinating, dysuria, enuresis, flank pain, frequency, genital sores, hematuria, penile discharge, penile pain, penile swelling, scrotal swelling, testicular pain and urgency.   Musculoskeletal:  Negative for arthralgias, back pain, gait problem, joint swelling, myalgias, neck pain and neck stiffness.   Skin:  Positive for color change (jaundice). Negative for pallor, rash and wound.   Allergic/Immunologic: Negative for environmental allergies, food allergies and immunocompromised state.   Neurological:  Positive for weakness. Negative for dizziness, tremors, seizures, syncope, facial asymmetry, speech difficulty, light-headedness, numbness and headaches.   Hematological:  Negative for adenopathy. Does not bruise/bleed easily.   Psychiatric/Behavioral:  Negative for agitation, behavioral problems, confusion, decreased concentration, dysphoric mood, hallucinations, self-injury, sleep disturbance and suicidal ideas. The patient is not nervous/anxious and is not hyperactive.    Objective:     Vital Signs (Most Recent):  Temp: 98.5 °F (36.9 °C) (08/05/22 0637)  Pulse: 71 (08/05/22 0637)  Resp: 16 (08/05/22 0637)  BP: 121/61 (08/05/22 0637)  SpO2: 100 % (08/05/22 0637) Vital Signs (24h Range):  Temp:  [97.8 °F (36.6 °C)-98.5 °F (36.9 °C)] 98.5 °F (36.9 °C)  Pulse:  [] 71  Resp:  [16-20] 16  SpO2:  [96 %-100 %] 100 %  BP: (111-121)/(56-61) 121/61     Weight: 70.4 kg (155 lb 3.3 oz)  Body mass index is 21.05 kg/m².    Physical Exam  Constitutional:       General: He is not in acute distress.     Appearance: He is well-developed. He is ill-appearing. He is not diaphoretic.   HENT:      Head: Normocephalic and atraumatic.      Nose: Nose normal.      Mouth/Throat:      Pharynx: No oropharyngeal exudate.   Eyes:      General: Scleral icterus present.      Extraocular Movements: EOM normal.      Conjunctiva/sclera: Conjunctivae normal.      Pupils: Pupils are equal, round, and reactive to light.   Neck:       Thyroid: No thyromegaly.      Vascular: No JVD.      Trachea: No tracheal deviation.   Cardiovascular:      Rate and Rhythm: Normal rate and regular rhythm.      Heart sounds: Normal heart sounds. No murmur heard.  Pulmonary:      Effort: Pulmonary effort is normal. No respiratory distress.      Breath sounds: Normal breath sounds. No wheezing or rales.   Chest:      Chest wall: No tenderness.   Abdominal:      General: Bowel sounds are normal. There is distension (mild abdominal distention).      Palpations: Abdomen is soft. There is no mass.      Tenderness: There is no abdominal tenderness. There is no guarding or rebound.   Musculoskeletal:         General: No tenderness. Normal range of motion.      Cervical back: Normal range of motion and neck supple.   Lymphadenopathy:      Cervical: No cervical adenopathy.   Skin:     General: Skin is warm and dry.      Coloration: Skin is jaundiced.      Findings: No erythema or rash.   Neurological:      Mental Status: He is alert and oriented to person, place, and time.      Cranial Nerves: No cranial nerve deficit.      Motor: No abnormal muscle tone.      Coordination: Coordination normal.      Deep Tendon Reflexes: Reflexes are normal and symmetric. Reflexes normal.      Comments: Positive asterixis    Psychiatric:         Thought Content: Thought content normal.         Judgment: Judgment normal.         CRANIAL NERVES     CN III, IV, VI   Pupils are equal, round, and reactive to light.  Extraocular motions are normal.      Significant Labs: All pertinent labs within the past 24 hours have been reviewed.  None    Recent Results (from the past 24 hour(s))   SARS-COV-2 (COVID-19) QUALITATIVE PCR    Collection Time: 08/04/22 12:50 PM   Result Value Ref Range    SARS-CoV2 (COVID-19) Qualitative PCR Presumptive Negative Presumptive Negative, see note   Comprehensive Metabolic Panel    Collection Time: 08/05/22  5:27 AM   Result Value Ref Range    Sodium 135 (L) 136 -  145 mmol/L    Potassium 3.5 3.5 - 5.1 mmol/L    Chloride 104 95 - 110 mmol/L    CO2 28 23 - 29 mmol/L    Glucose 105 70 - 110 mg/dL    BUN 36 (H) 6 - 20 mg/dL    Creatinine 1.0 0.5 - 1.4 mg/dL    Calcium 7.1 (L) 8.7 - 10.5 mg/dL    Total Protein 4.0 (L) 6.0 - 8.4 g/dL    Albumin 2.1 (L) 3.5 - 5.2 g/dL    Total Bilirubin 14.7 (H) 0.1 - 1.0 mg/dL    Alkaline Phosphatase 71 55 - 135 U/L     (H) 10 - 40 U/L     (H) 10 - 44 U/L    Anion Gap 3 (L) 8 - 16 mmol/L    eGFR >60.0 >60 mL/min/1.73 m^2   Magnesium    Collection Time: 08/05/22  5:27 AM   Result Value Ref Range    Magnesium 1.9 1.6 - 2.6 mg/dL   Phosphorus    Collection Time: 08/05/22  5:27 AM   Result Value Ref Range    Phosphorus 1.9 (L) 2.7 - 4.5 mg/dL   Type & Screen    Collection Time: 08/05/22  5:27 AM   Result Value Ref Range    Group & Rh A POS     Indirect [place] NEG    Ammonia    Collection Time: 08/05/22  5:27 AM   Result Value Ref Range    Ammonia 41 10 - 50 umol/L   Lipase    Collection Time: 08/05/22  5:27 AM   Result Value Ref Range    Lipase 39 4 - 60 U/L   Hemoglobin A1c    Collection Time: 08/05/22  5:27 AM   Result Value Ref Range    Hemoglobin A1C 4.5 4.0 - 5.6 %    Estimated Avg Glucose 82 68 - 131 mg/dL   CBC Auto Differential    Collection Time: 08/05/22  5:28 AM   Result Value Ref Range    WBC 6.96 3.90 - 12.70 K/uL    RBC 2.74 (L) 4.60 - 6.20 M/uL    Hemoglobin 7.5 (L) 14.0 - 18.0 g/dL    Hematocrit 22.3 (L) 40.0 - 54.0 %    MCV 81 (L) 82 - 98 fL    MCH 27.4 27.0 - 31.0 pg    MCHC 33.6 32.0 - 36.0 g/dL    RDW 19.9 (H) 11.5 - 14.5 %    Platelets 57 (L) 150 - 450 K/uL    MPV SEE COMMENT 9.2 - 12.9 fL    Immature Granulocytes 0.6 (H) 0.0 - 0.5 %    Gran # (ANC) 5.6 1.8 - 7.7 K/uL    Immature Grans (Abs) 0.04 0.00 - 0.04 K/uL    Lymph # 0.6 (L) 1.0 - 4.8 K/uL    Mono # 0.7 0.3 - 1.0 K/uL    Eos # 0.1 0.0 - 0.5 K/uL    Baso # 0.01 0.00 - 0.20 K/uL    nRBC 0 0 /100 WBC    Gran % 79.7 (H) 38.0 - 73.0 %    Lymph % 8.9 (L) 18.0 - 48.0  %    Mono % 9.8 4.0 - 15.0 %    Eosinophil % 0.9 0.0 - 8.0 %    Basophil % 0.1 0.0 - 1.9 %    Differential Method Automated    Protime-INR    Collection Time: 08/05/22  5:28 AM   Result Value Ref Range    Prothrombin Time 28.3 (H) 9.0 - 12.5 sec    INR 2.9 (H) 0.8 - 1.2   Ethanol    Collection Time: 08/05/22  5:28 AM   Result Value Ref Range    Alcohol, Serum <10 <10 mg/dL         Significant Imaging: I have reviewed all pertinent imaging results/findings within the past 24 hours.    Assessment/Plan:     * Bleeding esophageal varices  -presented to outside hospital with acute hematemesis due to bleeding EV   -Hgb low 2.6 at outside hospital and received total 5 units packed PRBC transfusion   -s/p EGD at outside hospital with banding of EV   -repeat Hgb today 7.5  -no signs of active bleeding and remained hemodynamically stable   -continue octreotide and protonix   -empiric ciprofloxacin given h/o PCN allergy   -monitor for bleeding closely and transfuse PRBC prn hgb < 7      Acute posthemorrhagic anemia  -see above under bleeding esophageal varices for management       Decompensated hepatic cirrhosis  -decompensated alcoholic cirrhosis   -long history of drinking and quit drinking ~ 6 weeks ago per patient     MELD-Na score: 30 at 8/5/2022  5:28 AM  MELD score: 29 at 8/5/2022  5:28 AM  Calculated from:  Serum Creatinine: 1.0 mg/dL at 8/5/2022  5:27 AM  Serum Sodium: 135 mmol/L at 8/5/2022  5:27 AM  Total Bilirubin: 14.7 mg/dL at 8/5/2022  5:27 AM  INR(ratio): 2.9 at 8/5/2022  5:28 AM  Age: 36 years  -ultrasound liver with doppler  -need diagnostic paracentesis if US positive for ascites   -check AFP, hep panel, HIV   -monitor MELD closely with daily labs   -consult hepatology for further evaluation     Hepatic encephalopathy  -had confusion at outside hospital   -treated with lactulose   -currently AAO X 4  -has asterixis   -continue lactulose       Thrombocytopenia  -platelet today 57  -no signs of active bleeding    -transfuse platelet prn < 50 in setting of recent severe GI bleeding   -avoid antiplatelet agent use       Coagulopathy  -INR elevated to 2.9  -due to liver cirrhosis   -vitamin K daily       VTE Risk Mitigation (From admission, onward)         Ordered     IP VTE LOW RISK PATIENT  Once         08/05/22 0653     Place sequential compression device  Until discontinued         08/05/22 0653                   Sisi Noble, DO  Department of Hospital Medicine   [facility] - Telemetry Stepdown

## 2022-08-05 NOTE — LETTER
LAB ORDERS    2022      ORDERING MD:   Dr. Humphrey Coronado MD        Patient Name: Porfirio Wilson               : 1985    [facility] Number: [id number]                  #:        Please draw the following labs:     Test Frequency  Diagnosis/ICD-10 Code     Hepatic Function Panel every 7 days  K74.6 Cirrhosis    (Albumin, AST/SGOT, Total     And Direct Bilirubin, Alk Phos,     ALT/SGPT)     Basic Metabolic Panel every 7 days  K74.6 Cirrhosis      (Glucose, BUN, Creatinine,     Sodium, Potassium, CO2,    Chloride)     PT/INR every 7 days  K74.6 Cirrhosis         FAX RESULTS -[phone] Attention Dr Coronado Staff, and send the hard copy when completed. If you have any questions regarding this request or need additional information, please call. Phone [phone]- for critical results

## 2022-08-05 NOTE — ASSESSMENT & PLAN NOTE
-platelet today 57  -no signs of active bleeding   -transfuse platelet prn < 50 in setting of recent severe GI bleeding   -avoid antiplatelet agent use

## 2022-08-05 NOTE — PLAN OF CARE
Problem: Adult Inpatient Plan of Care  Goal: Plan of Care Review  Outcome: Ongoing, Progressing  Goal: Optimal Comfort and Wellbeing  Outcome: Ongoing, Progressing     Patient received at 0330 from [facility]. A/O x4, VSS on RA SpO2 100%. Denies pain and no signs of distress noted. Pt ambulates independently to the toilet, and urinal BS. Sandostatin drip infusing now, pt tolerating well. Call bell in reach and no complaints at this time.

## 2022-08-05 NOTE — ASSESSMENT & PLAN NOTE
-had confusion at outside hospital   -treated with lactulose   -currently AAO X 4  -has asterixis   -continue lactulose

## 2022-08-05 NOTE — ASSESSMENT & PLAN NOTE
-presented to outside hospital with acute hematemesis due to bleeding EV   -Hgb low 2.6 at outside hospital and received total 5 units packed PRBC transfusion   -s/p EGD at outside hospital with banding of EV   -repeat Hgb today 7.5  -no signs of active bleeding and remained hemodynamically stable   -continue octreotide and protonix   -empiric ciprofloxacin given h/o PCN allergy   -monitor for bleeding closely and transfuse PRBC prn hgb < 7

## 2022-08-05 NOTE — ASSESSMENT & PLAN NOTE
-decompensated alcoholic cirrhosis   -long history of drinking and quit drinking ~ 6 weeks ago per patient     MELD-Na score: 30 at 8/5/2022  5:28 AM  MELD score: 29 at 8/5/2022  5:28 AM  Calculated from:  Serum Creatinine: 1.0 mg/dL at 8/5/2022  5:27 AM  Serum Sodium: 135 mmol/L at 8/5/2022  5:27 AM  Total Bilirubin: 14.7 mg/dL at 8/5/2022  5:27 AM  INR(ratio): 2.9 at 8/5/2022  5:28 AM  Age: 36 years  -ultrasound liver with doppler  -need diagnostic paracentesis if US positive for ascites   -check AFP, hep panel, HIV   -monitor MELD closely with daily labs   -consult hepatology for further evaluation

## 2022-08-06 LAB
ALBUMIN SERPL BCP-MCNC: 2 G/DL (ref 3.5–5.2)
ALP SERPL-CCNC: 76 U/L (ref 55–135)
ALT SERPL W/O P-5'-P-CCNC: 353 U/L (ref 10–44)
ANION GAP SERPL CALC-SCNC: 5 MMOL/L (ref 8–16)
AST SERPL-CCNC: 337 U/L (ref 10–40)
BASOPHILS # BLD AUTO: 0.01 K/UL (ref 0–0.2)
BASOPHILS NFR BLD: 0.2 % (ref 0–1.9)
BILIRUB DIRECT SERPL-MCNC: 8.1 MG/DL (ref 0.1–0.3)
BILIRUB SERPL-MCNC: 15.3 MG/DL (ref 0.1–1)
BUN SERPL-MCNC: 25 MG/DL (ref 6–20)
CALCIUM SERPL-MCNC: 6.9 MG/DL (ref 8.7–10.5)
CERULOPLASMIN SERPL-MCNC: 18 MG/DL (ref 15–45)
CHLORIDE SERPL-SCNC: 104 MMOL/L (ref 95–110)
CO2 SERPL-SCNC: 26 MMOL/L (ref 23–29)
CREAT SERPL-MCNC: 0.8 MG/DL (ref 0.5–1.4)
DAT IGG-SP REAG RBC-IMP: NORMAL
DIFFERENTIAL METHOD: ABNORMAL
EOSINOPHIL # BLD AUTO: 0.1 K/UL (ref 0–0.5)
EOSINOPHIL NFR BLD: 2.6 % (ref 0–8)
ERYTHROCYTE [DISTWIDTH] IN BLOOD BY AUTOMATED COUNT: 21.5 % (ref 11.5–14.5)
EST. GFR  (NO RACE VARIABLE): >60 ML/MIN/1.73 M^2
FERRITIN SERPL-MCNC: 54 NG/ML (ref 20–300)
GLUCOSE SERPL-MCNC: 87 MG/DL (ref 70–110)
HAPTOGLOB SERPL-MCNC: <10 MG/DL (ref 30–250)
HCT VFR BLD AUTO: 21.5 % (ref 40–54)
HGB BLD-MCNC: 7.1 G/DL (ref 14–18)
HIV1 RNA # PLAS NAA DL=20: NORMAL COPIES/ML
IGG SERPL-MCNC: 1008 MG/DL (ref 650–1600)
IMM GRANULOCYTES # BLD AUTO: 0.03 K/UL (ref 0–0.04)
IMM GRANULOCYTES NFR BLD AUTO: 0.5 % (ref 0–0.5)
INR PPP: 2.5 (ref 0.8–1.2)
IRON SERPL-MCNC: <10 UG/DL (ref 45–160)
LDH SERPL L TO P-CCNC: 334 U/L (ref 110–260)
LYMPHOCYTES # BLD AUTO: 0.8 K/UL (ref 1–4.8)
LYMPHOCYTES NFR BLD: 15 % (ref 18–48)
MAGNESIUM SERPL-MCNC: 2 MG/DL (ref 1.6–2.6)
MCH RBC QN AUTO: 27.5 PG (ref 27–31)
MCHC RBC AUTO-ENTMCNC: 33 G/DL (ref 32–36)
MCV RBC AUTO: 83 FL (ref 82–98)
MONOCYTES # BLD AUTO: 0.6 K/UL (ref 0.3–1)
MONOCYTES NFR BLD: 11.4 % (ref 4–15)
NEUTROPHILS # BLD AUTO: 3.8 K/UL (ref 1.8–7.7)
NEUTROPHILS NFR BLD: 70.3 % (ref 38–73)
NRBC BLD-RTO: 0 /100 WBC
PHOSPHATE SERPL-MCNC: 3.5 MG/DL (ref 2.7–4.5)
PLATELET # BLD AUTO: 57 K/UL (ref 150–450)
PMV BLD AUTO: ABNORMAL FL (ref 9.2–12.9)
POTASSIUM SERPL-SCNC: 3.7 MMOL/L (ref 3.5–5.1)
PROT SERPL-MCNC: 3.9 G/DL (ref 6–8.4)
PROTHROMBIN TIME: 25.1 SEC (ref 9–12.5)
RBC # BLD AUTO: 2.58 M/UL (ref 4.6–6.2)
SATURATED IRON: ABNORMAL % (ref 20–50)
SODIUM SERPL-SCNC: 135 MMOL/L (ref 136–145)
TOTAL IRON BINDING CAPACITY: 232 UG/DL (ref 250–450)
TRANSFERRIN SERPL-MCNC: 157 MG/DL (ref 200–375)
WBC # BLD AUTO: 5.46 K/UL (ref 3.9–12.7)

## 2022-08-06 PROCEDURE — 83010 ASSAY OF HAPTOGLOBIN QUANT: CPT | Performed by: STUDENT IN AN ORGANIZED HEALTH CARE EDUCATION/TRAINING PROGRAM

## 2022-08-06 PROCEDURE — 25000003 PHARM REV CODE 250: Performed by: HOSPITALIST

## 2022-08-06 PROCEDURE — 86235 NUCLEAR ANTIGEN ANTIBODY: CPT | Mod: 91 | Performed by: STUDENT IN AN ORGANIZED HEALTH CARE EDUCATION/TRAINING PROGRAM

## 2022-08-06 PROCEDURE — 82728 ASSAY OF FERRITIN: CPT | Performed by: STUDENT IN AN ORGANIZED HEALTH CARE EDUCATION/TRAINING PROGRAM

## 2022-08-06 PROCEDURE — 99233 SBSQ HOSP IP/OBS HIGH 50: CPT | Mod: ,,, | Performed by: STUDENT IN AN ORGANIZED HEALTH CARE EDUCATION/TRAINING PROGRAM

## 2022-08-06 PROCEDURE — 82248 BILIRUBIN DIRECT: CPT | Performed by: STUDENT IN AN ORGANIZED HEALTH CARE EDUCATION/TRAINING PROGRAM

## 2022-08-06 PROCEDURE — 80053 COMPREHEN METABOLIC PANEL: CPT | Performed by: HOSPITALIST

## 2022-08-06 PROCEDURE — 85610 PROTHROMBIN TIME: CPT | Performed by: HOSPITALIST

## 2022-08-06 PROCEDURE — 83735 ASSAY OF MAGNESIUM: CPT | Performed by: HOSPITALIST

## 2022-08-06 PROCEDURE — 82390 ASSAY OF CERULOPLASMIN: CPT | Performed by: STUDENT IN AN ORGANIZED HEALTH CARE EDUCATION/TRAINING PROGRAM

## 2022-08-06 PROCEDURE — 83615 LACTATE (LD) (LDH) ENZYME: CPT | Performed by: STUDENT IN AN ORGANIZED HEALTH CARE EDUCATION/TRAINING PROGRAM

## 2022-08-06 PROCEDURE — 20600001 HC STEP DOWN PRIVATE ROOM

## 2022-08-06 PROCEDURE — 63600175 PHARM REV CODE 636 W HCPCS: Mod: JA | Performed by: STUDENT IN AN ORGANIZED HEALTH CARE EDUCATION/TRAINING PROGRAM

## 2022-08-06 PROCEDURE — 82103 ALPHA-1-ANTITRYPSIN TOTAL: CPT | Performed by: STUDENT IN AN ORGANIZED HEALTH CARE EDUCATION/TRAINING PROGRAM

## 2022-08-06 PROCEDURE — 63600175 PHARM REV CODE 636 W HCPCS: Performed by: STUDENT IN AN ORGANIZED HEALTH CARE EDUCATION/TRAINING PROGRAM

## 2022-08-06 PROCEDURE — 86880 COOMBS TEST DIRECT: CPT | Performed by: STUDENT IN AN ORGANIZED HEALTH CARE EDUCATION/TRAINING PROGRAM

## 2022-08-06 PROCEDURE — 84100 ASSAY OF PHOSPHORUS: CPT | Performed by: HOSPITALIST

## 2022-08-06 PROCEDURE — 82784 ASSAY IGA/IGD/IGG/IGM EACH: CPT | Performed by: STUDENT IN AN ORGANIZED HEALTH CARE EDUCATION/TRAINING PROGRAM

## 2022-08-06 PROCEDURE — 84466 ASSAY OF TRANSFERRIN: CPT | Performed by: STUDENT IN AN ORGANIZED HEALTH CARE EDUCATION/TRAINING PROGRAM

## 2022-08-06 PROCEDURE — 86038 ANTINUCLEAR ANTIBODIES: CPT | Performed by: STUDENT IN AN ORGANIZED HEALTH CARE EDUCATION/TRAINING PROGRAM

## 2022-08-06 PROCEDURE — 25000003 PHARM REV CODE 250: Performed by: STUDENT IN AN ORGANIZED HEALTH CARE EDUCATION/TRAINING PROGRAM

## 2022-08-06 PROCEDURE — 85025 COMPLETE CBC W/AUTO DIFF WBC: CPT | Performed by: HOSPITALIST

## 2022-08-06 PROCEDURE — 99233 PR SUBSEQUENT HOSPITAL CARE,LEVL III: ICD-10-PCS | Mod: ,,, | Performed by: STUDENT IN AN ORGANIZED HEALTH CARE EDUCATION/TRAINING PROGRAM

## 2022-08-06 PROCEDURE — 86256 FLUORESCENT ANTIBODY TITER: CPT | Performed by: STUDENT IN AN ORGANIZED HEALTH CARE EDUCATION/TRAINING PROGRAM

## 2022-08-06 PROCEDURE — 83516 IMMUNOASSAY NONANTIBODY: CPT | Performed by: STUDENT IN AN ORGANIZED HEALTH CARE EDUCATION/TRAINING PROGRAM

## 2022-08-06 RX ORDER — IBUPROFEN 400 MG/1
800 TABLET ORAL DAILY PRN
Status: ON HOLD | COMMUNITY
End: 2022-08-08 | Stop reason: HOSPADM

## 2022-08-06 RX ORDER — CALCIUM GLUCONATE 20 MG/ML
1 INJECTION, SOLUTION INTRAVENOUS
Status: COMPLETED | OUTPATIENT
Start: 2022-08-06 | End: 2022-08-06

## 2022-08-06 RX ORDER — LANOLIN ALCOHOL/MO/W.PET/CERES
1 CREAM (GRAM) TOPICAL DAILY
Status: DISCONTINUED | OUTPATIENT
Start: 2022-08-06 | End: 2022-08-08 | Stop reason: HOSPADM

## 2022-08-06 RX ORDER — MUPIROCIN 20 MG/G
OINTMENT TOPICAL 2 TIMES DAILY
Status: DISCONTINUED | OUTPATIENT
Start: 2022-08-06 | End: 2022-08-08 | Stop reason: HOSPADM

## 2022-08-06 RX ADMIN — LACTULOSE 10 G: 20 SOLUTION ORAL at 09:08

## 2022-08-06 RX ADMIN — FERROUS SULFATE TAB 325 MG (65 MG ELEMENTAL FE) 1 EACH: 325 (65 FE) TAB at 10:08

## 2022-08-06 RX ADMIN — PROPRANOLOL HYDROCHLORIDE 10 MG: 10 TABLET ORAL at 09:08

## 2022-08-06 RX ADMIN — LACTULOSE 10 G: 20 SOLUTION ORAL at 02:08

## 2022-08-06 RX ADMIN — Medication 100 MG: at 08:08

## 2022-08-06 RX ADMIN — MUPIROCIN: 20 OINTMENT TOPICAL at 08:08

## 2022-08-06 RX ADMIN — PROPRANOLOL HYDROCHLORIDE 10 MG: 10 TABLET ORAL at 08:08

## 2022-08-06 RX ADMIN — FOLIC ACID 1 MG: 1 TABLET ORAL at 08:08

## 2022-08-06 RX ADMIN — CALCIUM GLUCONATE 1 G: 20 INJECTION, SOLUTION INTRAVENOUS at 09:08

## 2022-08-06 RX ADMIN — CIPROFLOXACIN 500 MG: 500 TABLET, FILM COATED ORAL at 08:08

## 2022-08-06 RX ADMIN — OCTREOTIDE ACETATE 50 MCG/HR: 500 INJECTION, SOLUTION INTRAVENOUS; SUBCUTANEOUS at 12:08

## 2022-08-06 RX ADMIN — LACTULOSE 10 G: 20 SOLUTION ORAL at 08:08

## 2022-08-06 RX ADMIN — PANTOPRAZOLE SODIUM 40 MG: 40 TABLET, DELAYED RELEASE ORAL at 08:08

## 2022-08-06 RX ADMIN — CIPROFLOXACIN 500 MG: 500 TABLET, FILM COATED ORAL at 09:08

## 2022-08-06 RX ADMIN — CALCIUM GLUCONATE 1 G: 20 INJECTION, SOLUTION INTRAVENOUS at 10:08

## 2022-08-06 RX ADMIN — MUPIROCIN: 20 OINTMENT TOPICAL at 09:08

## 2022-08-06 RX ADMIN — PHYTONADIONE 10 MG: 10 INJECTION, EMULSION INTRAMUSCULAR; INTRAVENOUS; SUBCUTANEOUS at 08:08

## 2022-08-06 NOTE — PLAN OF CARE
Problem: Adult Inpatient Plan of Care  Goal: Plan of Care Review  Outcome: Ongoing, Progressing  Goal: Patient-Specific Goal (Individualized)  Outcome: Ongoing, Progressing  Goal: Absence of Hospital-Acquired Illness or Injury  Outcome: Ongoing, Progressing  Goal: Optimal Comfort and Wellbeing  Outcome: Ongoing, Progressing  Goal: Readiness for Transition of Care  Outcome: Ongoing, Progressing     Problem: Fall Injury Risk  Goal: Absence of Fall and Fall-Related Injury  Outcome: Ongoing, Progressing     Problem: Adjustment to Illness (Liver Failure)  Goal: Optimal Coping with Liver Failure  Outcome: Ongoing, Progressing     Problem: Gastrointestinal Complications (Liver Failure)  Goal: Optimal Gastrointestinal Function  Outcome: Ongoing, Progressing     Problem: Impaired Coagulation (Liver Failure)  Goal: Optimal Coagulation Function  Outcome: Ongoing, Progressing     Problem: Neurologic Function Impaired (Liver Failure)  Goal: Optimal Neurologic Function  Outcome: Ongoing, Progressing     Problem: Oral Intake Inadequate (Liver Failure)  Goal: Improved Oral Intake  Outcome: Ongoing, Progressing

## 2022-08-06 NOTE — HOSPITAL COURSE
Patient not a current transplant candidate. He completed abx for 5 days for his history of variceal bleed. Octreotide gtt complete as well. To be discharged home with lactulose for HE ppx, propranolol for EV history, and PPI. Continuing MVT and thiamine given etoh use history. Patient endorsed that he would seek etoh rehab assistance closer to home once he figures out the caregiver situation for his mother as he is her primary caregiver at this current time.

## 2022-08-06 NOTE — PLAN OF CARE
Discussed POC with patient at beginning of shift. Questions were asked and answered.  Pt stated understanding of POC.  Pt AAO x 4 throughout shift. No complaints of pain.  Type and screen drawn. Pt remained free from injury. Denies needs at this time. Will continue to monitor.

## 2022-08-06 NOTE — PHARMACY MED REC
"Admission Medication History     The home medication history was taken by Enedina Ramos.    You may go to "Admission" then "Reconcile Home Medications" tabs to review and/or act upon these items.      The home medication list has been updated by the Pharmacy department.    Please read ALL comments highlighted in yellow.    Please address this information as you see fit.     Feel free to contact us if you have any questions or require assistance.      The medications listed below were removed from the home medication list. Please reorder if appropriate:  Patient reports no longer taking the following medication(s):   HYDROXYZINE 25 MG   ONDANSETRON 8 MG   PANTOPRAZOLE 40 MG   PAROXETINE 10 MG      Medications listed below were obtained from: Patient/family  PTA Medications   Medication Sig    ibuprofen (ADVIL,MOTRIN) 400 MG tablet Take 800 mg by mouth daily as needed (pain).       Potential issues to be addressed PRIOR TO DISCHARGE  Please discuss with the patient barriers to adherence with medication treatment plans    Enedina Ramos  EXT [id number]                  .          "

## 2022-08-06 NOTE — ASSESSMENT & PLAN NOTE
Chronic,stable  2/2 underlying liver disease   - no signs of active bleeding   - transfuse platelet prn < 50 in setting of recent severe GI bleeding   - avoid antiplatelet agent use

## 2022-08-06 NOTE — ASSESSMENT & PLAN NOTE
Presented to outside hospital with acute hematemesis due to bleeding EV. Hgb low 2.6 at outside hospital and received total 5 units packed PRBC transfusion   - s/p EGD at outside hospital with banding of EV   - Hgb has remained stable, no additional episodes of hematemesis  - Continue octreotide for 72 hrs, ends today.  - continue ciprofloxacin for 5 days total therapy, ends tomorrow.

## 2022-08-06 NOTE — PROGRESS NOTES
[facility]  Progress Note    Patient Name: Porfirio Wilson  MRN: [id number]  Patient Class: IP- Inpatient   Admission Date: 8/5/2022  Length of Stay: 1 days  Attending Physician: Taryn Linares MD  Primary Care Provider: Primary Doctor No        Subjective:     Principal Problem:Bleeding esophageal varices        HPI:  Mr. Wilson is a 36-year-old male with a history of alcohol abuse, cirrhosis, pancreatitis, and prior variceal bleed who presents as a transfer from [facility] for management of acute decompensated alcoholic liver cirrhosis and bleeding of esophageal varices. Patient was admitted to outside hospital on August 3 with hematemesis.  He was transferred there from [facility] after being found hypotensive with hematemesis and significant anemia (hemoglobin around 2.6).  He received total 5 units of packed red blood cells, FFP, Protonix infusion, and octreotide infusion.  He was transferred to [facility] where he was seen by Gastroenterology.  No hematemesis noted since he has been at [facility].  EGD showed large esophageal varices with successful band ligation.  Stomach and duodenum were unremarkable.  He was treated with aztreonam, Flagyl, lactulose, octreotide infusion, Protonix, Carafate, Xifaxan.  Referring provider noted patient is awake but confused/disoriented.         August 4:  COVID negative  INR 4.4, ammonia 57, white blood cells 13.4, hemoglobin 7.1, hematocrit 21.9, platelets 72, , , total bilirubin 12.1, sodium 131, potassium 4.1, chloride 100, CO2 28, BUN 58, creatinine 1.38, glucose 135     August 3:  White blood cells 20, hemoglobin 6.1, hematocrit 18.7, platelets 76, INR 3.2, sodium 130, potassium 4.5, chloride 98, CO2 19, BUN 52, creatinine 1.44, glucose 120, total bilirubin 10.4, AST 1275,     During my interview upon arrival to [facility] patient is awake, conversant and not in distress. He is afebrile  and oxygenating well on room air. Patient denies any further bleeding episodes since EGD at outside hospital. Patient states he has been drinking up to fifth of vodka daily until 6 weeks ago and since he has been drinking mountain dew instead of alcohol. He lives at home with his mother and sister. He reports working as a . Patient smokes cigarettes and marijuana occasionally but denies h/o IVDU or other illicit drug use.       Overview/Hospital Course:  Patient not a current transplant candidate. Will continue abx for 5 days since EGD for variceal banding. Octreotide for 72 hrs.      Interval History: No acute issues overnight. Patient reports doing well today. States that he has more energy. He understands that he is extremely iron deficient and willing to take iron supplements.    Review of Systems   Constitutional:  Negative for chills and fever.   HENT:  Negative for congestion and sore throat.    Eyes:  Negative for photophobia and visual disturbance.   Respiratory:  Negative for cough and shortness of breath.    Cardiovascular:  Negative for chest pain and palpitations.   Gastrointestinal:  Negative for abdominal pain, blood in stool, constipation, diarrhea, nausea and vomiting.   Endocrine: Negative for cold intolerance and heat intolerance.   Genitourinary:  Negative for dysuria and hematuria.   Musculoskeletal:  Negative for arthralgias and myalgias.   Skin:  Negative for rash and wound.   Allergic/Immunologic: Negative for environmental allergies and food allergies.   Neurological:  Negative for seizures and numbness.   Hematological:  Negative for adenopathy. Does not bruise/bleed easily.   Psychiatric/Behavioral:  Negative for hallucinations and suicidal ideas.    Objective:     Vital Signs (Most Recent):  Temp: 98.7 °F (37.1 °C) (08/06/22 1145)  Pulse: 67 (08/06/22 1145)  Resp: 18 (08/06/22 1145)  BP: 114/69 (08/06/22 1145)  SpO2: 95 % (08/06/22 1145) Vital Signs (24h Range):  Temp:  [98.1 °F  (36.7 °C)-98.7 °F (37.1 °C)] 98.7 °F (37.1 °C)  Pulse:  [67-81] 67  Resp:  [16-18] 18  SpO2:  [92 %-97 %] 95 %  BP: (102-133)/(58-69) 114/69     Weight: 78.2 kg (172 lb 6.4 oz)  Body mass index is 23.38 kg/m².    Intake/Output Summary (Last 24 hours) at 8/6/2022 1153  Last data filed at 8/5/2022 2300  Gross per 24 hour   Intake 770 ml   Output --   Net 770 ml      Physical Exam  Constitutional:       Appearance: He is well-developed.   HENT:      Head: Normocephalic and atraumatic.   Eyes:      General: Scleral icterus present.      Conjunctiva/sclera: Conjunctivae normal.      Pupils: Pupils are equal, round, and reactive to light.   Neck:      Thyroid: No thyromegaly.      Vascular: No JVD.   Cardiovascular:      Rate and Rhythm: Normal rate and regular rhythm.      Heart sounds: Normal heart sounds. No murmur heard.    No friction rub. No gallop.   Pulmonary:      Effort: Pulmonary effort is normal. No respiratory distress.      Breath sounds: Normal breath sounds. No wheezing or rales.   Abdominal:      General: Bowel sounds are normal. There is no distension.      Palpations: Abdomen is soft. There is no mass.      Tenderness: There is no abdominal tenderness. There is no guarding or rebound.      Hernia: No hernia is present.   Musculoskeletal:         General: No deformity. Normal range of motion.      Cervical back: Normal range of motion and neck supple.      Right lower leg: No edema.      Left lower leg: No edema.   Skin:     General: Skin is warm and dry.      Coloration: Skin is jaundiced.   Neurological:      Mental Status: He is alert and oriented to person, place, and time.      Cranial Nerves: No cranial nerve deficit.   Psychiatric:         Behavior: Behavior normal.       Significant Labs: All pertinent labs within the past 24 hours have been reviewed.  CBC:   Recent Labs   Lab 08/05/22  0528 08/06/22  0314   WBC 6.96 5.46   HGB 7.5* 7.1*   HCT 22.3* 21.5*   PLT 57* 57*     CMP:   Recent Labs    Lab 08/05/22  0527 08/06/22  0314   * 135*   K 3.5 3.7    104   CO2 28 26    87   BUN 36* 25*   CREATININE 1.0 0.8   CALCIUM 7.1* 6.9*   PROT 4.0* 3.9*   ALBUMIN 2.1* 2.0*   BILITOT 14.7* 15.3*   ALKPHOS 71 76   * 337*   * 353*   ANIONGAP 3* 5*     Significant Imaging: I have reviewed all pertinent imaging results/findings within the past 24 hours.      Assessment/Plan:      * Bleeding esophageal varices  Presented to outside hospital with acute hematemesis due to bleeding EV. Hgb low 2.6 at outside hospital and received total 5 units packed PRBC transfusion   - s/p EGD at outside hospital with banding of EV   - Hgb has remained stable, no additional episodes of hematemesis  - Continue octreotide for 72 hrs, ends today.  - continue ciprofloxacin for 5 days total therapy, ends tomorrow.        Hepatic encephalopathy  Reportedly had confusion at outside hospital   - Continue with lactulose   - Currently mentation is AOx4, unclear if HE at OSH was first episode or not.      Thrombocytopenia  Chronic,stable  2/2 underlying liver disease   - no signs of active bleeding   - transfuse platelet prn < 50 in setting of recent severe GI bleeding   - avoid antiplatelet agent use       Coagulopathy  INR elevated to 2.9, due to liver cirrhosis   - vitamin K daily for 3 doses      Decompensated hepatic cirrhosis  MELD-Na score: 28 at 8/6/2022  3:14 AM  MELD score: 27 at 8/6/2022  3:14 AM  Calculated from:  Serum Creatinine: 0.8 mg/dL (Using min of 1 mg/dL) at 8/6/2022  3:14 AM  Serum Sodium: 135 mmol/L at 8/6/2022  3:14 AM  Total Bilirubin: 15.3 mg/dL at 8/6/2022  3:14 AM  INR(ratio): 2.5 at 8/6/2022  3:14 AM  Age: 36 years  Decompensated etoh cirrhosis. Long history of drinking and quit drinking ~ 6 weeks ago per patient. Longest period of sobriety was 3-4 months but that was 5-6 years ago. He uses etoh as a coping mechanism for his depressive episodes tied to his bipolar diagnosis.  -  Hepatology following  - Additional w/u for other causes of cirrhosis at an early age ordered.    Acute posthemorrhagic anemia  - see above under bleeding esophageal varices for management   - Patient also iron deficient        VTE Risk Mitigation (From admission, onward)         Ordered     IP VTE LOW RISK PATIENT  Once         08/05/22 0653     Place sequential compression device  Until discontinued         08/05/22 0653                Discharge Planning   ENIO: 8/9/2022     Code Status: Full Code   Is the patient medically ready for discharge?: No    Reason for patient still in hospital (select all that apply): Patient trending condition  Discharge Plan A: Home with family                  Taryn Linares MD  Department of Hospital Medicine   [facility] - Telemetry Stepdown

## 2022-08-06 NOTE — ASSESSMENT & PLAN NOTE
Reportedly had confusion at outside hospital   - Continue with lactulose   - Currently mentation is AOx4, unclear if HE at OSH was first episode or not.

## 2022-08-06 NOTE — SUBJECTIVE & OBJECTIVE
Interval History: No acute issues overnight. Patient reports doing well today. States that he has more energy. He understands that he is extremely iron deficient and willing to take iron supplements.    Review of Systems   Constitutional:  Negative for chills and fever.   HENT:  Negative for congestion and sore throat.    Eyes:  Negative for photophobia and visual disturbance.   Respiratory:  Negative for cough and shortness of breath.    Cardiovascular:  Negative for chest pain and palpitations.   Gastrointestinal:  Negative for abdominal pain, blood in stool, constipation, diarrhea, nausea and vomiting.   Endocrine: Negative for cold intolerance and heat intolerance.   Genitourinary:  Negative for dysuria and hematuria.   Musculoskeletal:  Negative for arthralgias and myalgias.   Skin:  Negative for rash and wound.   Allergic/Immunologic: Negative for environmental allergies and food allergies.   Neurological:  Negative for seizures and numbness.   Hematological:  Negative for adenopathy. Does not bruise/bleed easily.   Psychiatric/Behavioral:  Negative for hallucinations and suicidal ideas.    Objective:     Vital Signs (Most Recent):  Temp: 98.7 °F (37.1 °C) (08/06/22 1145)  Pulse: 67 (08/06/22 1145)  Resp: 18 (08/06/22 1145)  BP: 114/69 (08/06/22 1145)  SpO2: 95 % (08/06/22 1145) Vital Signs (24h Range):  Temp:  [98.1 °F (36.7 °C)-98.7 °F (37.1 °C)] 98.7 °F (37.1 °C)  Pulse:  [67-81] 67  Resp:  [16-18] 18  SpO2:  [92 %-97 %] 95 %  BP: (102-133)/(58-69) 114/69     Weight: 78.2 kg (172 lb 6.4 oz)  Body mass index is 23.38 kg/m².    Intake/Output Summary (Last 24 hours) at 8/6/2022 1153  Last data filed at 8/5/2022 2300  Gross per 24 hour   Intake 770 ml   Output --   Net 770 ml      Physical Exam  Constitutional:       Appearance: He is well-developed.   HENT:      Head: Normocephalic and atraumatic.   Eyes:      General: Scleral icterus present.      Conjunctiva/sclera: Conjunctivae normal.      Pupils: Pupils  are equal, round, and reactive to light.   Neck:      Thyroid: No thyromegaly.      Vascular: No JVD.   Cardiovascular:      Rate and Rhythm: Normal rate and regular rhythm.      Heart sounds: Normal heart sounds. No murmur heard.    No friction rub. No gallop.   Pulmonary:      Effort: Pulmonary effort is normal. No respiratory distress.      Breath sounds: Normal breath sounds. No wheezing or rales.   Abdominal:      General: Bowel sounds are normal. There is no distension.      Palpations: Abdomen is soft. There is no mass.      Tenderness: There is no abdominal tenderness. There is no guarding or rebound.      Hernia: No hernia is present.   Musculoskeletal:         General: No deformity. Normal range of motion.      Cervical back: Normal range of motion and neck supple.      Right lower leg: No edema.      Left lower leg: No edema.   Skin:     General: Skin is warm and dry.      Coloration: Skin is jaundiced.   Neurological:      Mental Status: He is alert and oriented to person, place, and time.      Cranial Nerves: No cranial nerve deficit.   Psychiatric:         Behavior: Behavior normal.       Significant Labs: All pertinent labs within the past 24 hours have been reviewed.  CBC:   Recent Labs   Lab 08/05/22 0528 08/06/22 0314   WBC 6.96 5.46   HGB 7.5* 7.1*   HCT 22.3* 21.5*   PLT 57* 57*     CMP:   Recent Labs   Lab 08/05/22 0527 08/06/22 0314   * 135*   K 3.5 3.7    104   CO2 28 26    87   BUN 36* 25*   CREATININE 1.0 0.8   CALCIUM 7.1* 6.9*   PROT 4.0* 3.9*   ALBUMIN 2.1* 2.0*   BILITOT 14.7* 15.3*   ALKPHOS 71 76   * 337*   * 353*   ANIONGAP 3* 5*     Significant Imaging: I have reviewed all pertinent imaging results/findings within the past 24 hours.

## 2022-08-06 NOTE — ASSESSMENT & PLAN NOTE
MELD-Na score: 28 at 8/6/2022  3:14 AM  MELD score: 27 at 8/6/2022  3:14 AM  Calculated from:  Serum Creatinine: 0.8 mg/dL (Using min of 1 mg/dL) at 8/6/2022  3:14 AM  Serum Sodium: 135 mmol/L at 8/6/2022  3:14 AM  Total Bilirubin: 15.3 mg/dL at 8/6/2022  3:14 AM  INR(ratio): 2.5 at 8/6/2022  3:14 AM  Age: 36 years  Decompensated etoh cirrhosis. Long history of drinking and quit drinking ~ 6 weeks ago per patient. Longest period of sobriety was 3-4 months but that was 5-6 years ago. He uses etoh as a coping mechanism for his depressive episodes tied to his bipolar diagnosis.  - Hepatology following  - Additional w/u for other causes of cirrhosis at an early age ordered.

## 2022-08-07 PROBLEM — E61.1 IRON DEFICIENCY: Status: ACTIVE | Noted: 2022-08-07

## 2022-08-07 LAB
ALBUMIN SERPL BCP-MCNC: 2 G/DL (ref 3.5–5.2)
ALP SERPL-CCNC: 89 U/L (ref 55–135)
ALT SERPL W/O P-5'-P-CCNC: 300 U/L (ref 10–44)
ANION GAP SERPL CALC-SCNC: 6 MMOL/L (ref 8–16)
AST SERPL-CCNC: 233 U/L (ref 10–40)
BASOPHILS # BLD AUTO: 0.02 K/UL (ref 0–0.2)
BASOPHILS NFR BLD: 0.3 % (ref 0–1.9)
BILIRUB SERPL-MCNC: 18 MG/DL (ref 0.1–1)
BUN SERPL-MCNC: 21 MG/DL (ref 6–20)
CALCIUM SERPL-MCNC: 7.1 MG/DL (ref 8.7–10.5)
CHLORIDE SERPL-SCNC: 102 MMOL/L (ref 95–110)
CO2 SERPL-SCNC: 24 MMOL/L (ref 23–29)
CREAT SERPL-MCNC: 0.9 MG/DL (ref 0.5–1.4)
DIFFERENTIAL METHOD: ABNORMAL
EOSINOPHIL # BLD AUTO: 0.3 K/UL (ref 0–0.5)
EOSINOPHIL NFR BLD: 4.4 % (ref 0–8)
ERYTHROCYTE [DISTWIDTH] IN BLOOD BY AUTOMATED COUNT: 23.1 % (ref 11.5–14.5)
EST. GFR  (NO RACE VARIABLE): >60 ML/MIN/1.73 M^2
GLUCOSE SERPL-MCNC: 92 MG/DL (ref 70–110)
HCT VFR BLD AUTO: 24.4 % (ref 40–54)
HGB BLD-MCNC: 7.8 G/DL (ref 14–18)
IMM GRANULOCYTES # BLD AUTO: 0.04 K/UL (ref 0–0.04)
IMM GRANULOCYTES NFR BLD AUTO: 0.6 % (ref 0–0.5)
INR PPP: 2 (ref 0.8–1.2)
LYMPHOCYTES # BLD AUTO: 0.6 K/UL (ref 1–4.8)
LYMPHOCYTES NFR BLD: 9.4 % (ref 18–48)
MAGNESIUM SERPL-MCNC: 1.9 MG/DL (ref 1.6–2.6)
MCH RBC QN AUTO: 26.7 PG (ref 27–31)
MCHC RBC AUTO-ENTMCNC: 32 G/DL (ref 32–36)
MCV RBC AUTO: 84 FL (ref 82–98)
MONOCYTES # BLD AUTO: 0.8 K/UL (ref 0.3–1)
MONOCYTES NFR BLD: 13.2 % (ref 4–15)
NEUTROPHILS # BLD AUTO: 4.6 K/UL (ref 1.8–7.7)
NEUTROPHILS NFR BLD: 72.1 % (ref 38–73)
NRBC BLD-RTO: 0 /100 WBC
PHOSPHATE SERPL-MCNC: 2.7 MG/DL (ref 2.7–4.5)
PLATELET # BLD AUTO: 72 K/UL (ref 150–450)
PMV BLD AUTO: ABNORMAL FL (ref 9.2–12.9)
POTASSIUM SERPL-SCNC: 3.5 MMOL/L (ref 3.5–5.1)
PROT SERPL-MCNC: 4.2 G/DL (ref 6–8.4)
PROTHROMBIN TIME: 20.1 SEC (ref 9–12.5)
RBC # BLD AUTO: 2.92 M/UL (ref 4.6–6.2)
SODIUM SERPL-SCNC: 132 MMOL/L (ref 136–145)
WBC # BLD AUTO: 6.36 K/UL (ref 3.9–12.7)

## 2022-08-07 PROCEDURE — 85610 PROTHROMBIN TIME: CPT | Performed by: HOSPITALIST

## 2022-08-07 PROCEDURE — 99233 PR SUBSEQUENT HOSPITAL CARE,LEVL III: ICD-10-PCS | Mod: ,,, | Performed by: STUDENT IN AN ORGANIZED HEALTH CARE EDUCATION/TRAINING PROGRAM

## 2022-08-07 PROCEDURE — 85025 COMPLETE CBC W/AUTO DIFF WBC: CPT | Performed by: HOSPITALIST

## 2022-08-07 PROCEDURE — 63600175 PHARM REV CODE 636 W HCPCS: Performed by: STUDENT IN AN ORGANIZED HEALTH CARE EDUCATION/TRAINING PROGRAM

## 2022-08-07 PROCEDURE — 83735 ASSAY OF MAGNESIUM: CPT | Performed by: HOSPITALIST

## 2022-08-07 PROCEDURE — 25000003 PHARM REV CODE 250: Performed by: STUDENT IN AN ORGANIZED HEALTH CARE EDUCATION/TRAINING PROGRAM

## 2022-08-07 PROCEDURE — 36415 COLL VENOUS BLD VENIPUNCTURE: CPT | Performed by: HOSPITALIST

## 2022-08-07 PROCEDURE — 84100 ASSAY OF PHOSPHORUS: CPT | Performed by: HOSPITALIST

## 2022-08-07 PROCEDURE — 25000003 PHARM REV CODE 250: Performed by: HOSPITALIST

## 2022-08-07 PROCEDURE — 80053 COMPREHEN METABOLIC PANEL: CPT | Performed by: HOSPITALIST

## 2022-08-07 PROCEDURE — 99233 SBSQ HOSP IP/OBS HIGH 50: CPT | Mod: ,,, | Performed by: STUDENT IN AN ORGANIZED HEALTH CARE EDUCATION/TRAINING PROGRAM

## 2022-08-07 PROCEDURE — 20600001 HC STEP DOWN PRIVATE ROOM

## 2022-08-07 RX ADMIN — CIPROFLOXACIN 500 MG: 500 TABLET, FILM COATED ORAL at 09:08

## 2022-08-07 RX ADMIN — PROPRANOLOL HYDROCHLORIDE 10 MG: 10 TABLET ORAL at 09:08

## 2022-08-07 RX ADMIN — FERROUS SULFATE TAB 325 MG (65 MG ELEMENTAL FE) 1 EACH: 325 (65 FE) TAB at 08:08

## 2022-08-07 RX ADMIN — PHYTONADIONE 10 MG: 10 INJECTION, EMULSION INTRAMUSCULAR; INTRAVENOUS; SUBCUTANEOUS at 09:08

## 2022-08-07 RX ADMIN — LACTULOSE 10 G: 20 SOLUTION ORAL at 02:08

## 2022-08-07 RX ADMIN — LACTULOSE 10 G: 20 SOLUTION ORAL at 08:08

## 2022-08-07 RX ADMIN — Medication 100 MG: at 08:08

## 2022-08-07 RX ADMIN — PANTOPRAZOLE SODIUM 40 MG: 40 TABLET, DELAYED RELEASE ORAL at 08:08

## 2022-08-07 RX ADMIN — PROPRANOLOL HYDROCHLORIDE 10 MG: 10 TABLET ORAL at 08:08

## 2022-08-07 RX ADMIN — CIPROFLOXACIN 500 MG: 500 TABLET, FILM COATED ORAL at 08:08

## 2022-08-07 RX ADMIN — MUPIROCIN: 20 OINTMENT TOPICAL at 08:08

## 2022-08-07 RX ADMIN — FOLIC ACID 1 MG: 1 TABLET ORAL at 09:08

## 2022-08-07 RX ADMIN — MUPIROCIN: 20 OINTMENT TOPICAL at 09:08

## 2022-08-07 NOTE — ASSESSMENT & PLAN NOTE
MELD-Na score: 27 at 8/7/2022  5:43 AM  MELD score: 25 at 8/7/2022  5:43 AM  Calculated from:  Serum Creatinine: 0.9 mg/dL (Using min of 1 mg/dL) at 8/7/2022  5:43 AM  Serum Sodium: 132 mmol/L at 8/7/2022  5:43 AM  Total Bilirubin: 18.0 mg/dL at 8/7/2022  5:43 AM  INR(ratio): 2.0 at 8/7/2022  5:43 AM  Age: 36 years  Decompensated etoh cirrhosis. Long history of drinking and quit drinking ~ 6 weeks ago per patient. Longest period of sobriety was 3-4 months but that was 5-6 years ago. He uses etoh as a coping mechanism for his depressive episodes tied to his bipolar diagnosis.  - Hepatology following, not a current transplant candidate.  - Additional w/u for other causes of cirrhosis at an early age ordered.

## 2022-08-07 NOTE — PLAN OF CARE
Problem: Adult Inpatient Plan of Care  Goal: Optimal Comfort and Wellbeing  Outcome: Ongoing, Progressing     Problem: Fall Injury Risk  Goal: Absence of Fall and Fall-Related Injury  Outcome: Ongoing, Progressing     Problem: Adjustment to Illness (Liver Failure)  Goal: Optimal Coping with Liver Failure  Outcome: Ongoing, Progressing     Problem: Gastrointestinal Complications (Liver Failure)  Goal: Optimal Gastrointestinal Function  Outcome: Ongoing, Progressing     Problem: Impaired Coagulation (Liver Failure)  Goal: Optimal Coagulation Function  Outcome: Ongoing, Progressing     Problem: Neurologic Function Impaired (Liver Failure)  Goal: Optimal Neurologic Function  Outcome: Ongoing, Progressing     Problem: Oral Intake Inadequate (Liver Failure)  Goal: Improved Oral Intake  Outcome: Ongoing, Progressing

## 2022-08-07 NOTE — PROGRESS NOTES
[facility]  Progress Note    Patient Name: Porfirio Wilson  MRN: [id number]  Patient Class: IP- Inpatient   Admission Date: 8/5/2022  Length of Stay: 2 days  Attending Physician: Taryn Linares MD  Primary Care Provider: Primary Doctor No        Subjective:     Principal Problem:Bleeding esophageal varices        HPI:  Mr. Wilson is a 36-year-old male with a history of alcohol abuse, cirrhosis, pancreatitis, and prior variceal bleed who presents as a transfer from [facility] for management of acute decompensated alcoholic liver cirrhosis and bleeding of esophageal varices. Patient was admitted to outside hospital on August 3 with hematemesis.  He was transferred there from [facility] after being found hypotensive with hematemesis and significant anemia (hemoglobin around 2.6).  He received total 5 units of packed red blood cells, FFP, Protonix infusion, and octreotide infusion.  He was transferred to [facility] where he was seen by Gastroenterology.  No hematemesis noted since he has been at [facility].  EGD showed large esophageal varices with successful band ligation.  Stomach and duodenum were unremarkable.  He was treated with aztreonam, Flagyl, lactulose, octreotide infusion, Protonix, Carafate, Xifaxan.  Referring provider noted patient is awake but confused/disoriented.         August 4:  COVID negative  INR 4.4, ammonia 57, white blood cells 13.4, hemoglobin 7.1, hematocrit 21.9, platelets 72, , , total bilirubin 12.1, sodium 131, potassium 4.1, chloride 100, CO2 28, BUN 58, creatinine 1.38, glucose 135     August 3:  White blood cells 20, hemoglobin 6.1, hematocrit 18.7, platelets 76, INR 3.2, sodium 130, potassium 4.5, chloride 98, CO2 19, BUN 52, creatinine 1.44, glucose 120, total bilirubin 10.4, AST 1275,     During my interview upon arrival to [facility] patient is awake, conversant and not in distress. He is afebrile  and oxygenating well on room air. Patient denies any further bleeding episodes since EGD at outside hospital. Patient states he has been drinking up to fifth of vodka daily until 6 weeks ago and since he has been drinking mountain dew instead of alcohol. He lives at home with his mother and sister. He reports working as a . Patient smokes cigarettes and marijuana occasionally but denies h/o IVDU or other illicit drug use.       Overview/Hospital Course:  Patient not a current transplant candidate. Will continue abx for 5 days since EGD for variceal banding. Octreotide gtt complete.      Interval History: No acute issues overnight. Patient reports that he is doing well. He continues to feel better. Looking forward to being discharged tomorrow. MELD labs stable.    Review of Systems   Constitutional:  Negative for chills and fever.   HENT:  Negative for congestion and sore throat.    Eyes:  Negative for photophobia and visual disturbance.   Respiratory:  Negative for cough and shortness of breath.    Cardiovascular:  Negative for chest pain and palpitations.   Gastrointestinal:  Negative for abdominal pain, blood in stool, constipation, diarrhea, nausea and vomiting.   Endocrine: Negative for cold intolerance and heat intolerance.   Genitourinary:  Negative for dysuria and hematuria.   Musculoskeletal:  Negative for arthralgias and myalgias.   Skin:  Negative for rash and wound.   Allergic/Immunologic: Negative for environmental allergies and food allergies.   Neurological:  Negative for seizures and numbness.   Hematological:  Negative for adenopathy. Does not bruise/bleed easily.   Psychiatric/Behavioral:  Negative for hallucinations and suicidal ideas.    Objective:     Vital Signs (Most Recent):  Temp: 98.2 °F (36.8 °C) (08/07/22 0819)  Pulse: 65 (08/07/22 0819)  Resp: 16 (08/07/22 0819)  BP: (!) 109/56 (08/07/22 0819)  SpO2: 95 % (08/07/22 0819) Vital Signs (24h Range):  Temp:  [98 °F (36.7 °C)-99.1 °F  (37.3 °C)] 98.2 °F (36.8 °C)  Pulse:  [65-70] 65  Resp:  [16-20] 16  SpO2:  [94 %-96 %] 95 %  BP: ()/(53-72) 109/56     Weight: 78.2 kg (172 lb 6.4 oz)  Body mass index is 23.38 kg/m².    Intake/Output Summary (Last 24 hours) at 8/7/2022 1019  Last data filed at 8/7/2022 0900  Gross per 24 hour   Intake 700 ml   Output --   Net 700 ml        Physical Exam  Constitutional:       Appearance: He is well-developed.   HENT:      Head: Normocephalic and atraumatic.   Eyes:      General: Scleral icterus present.      Conjunctiva/sclera: Conjunctivae normal.      Pupils: Pupils are equal, round, and reactive to light.   Neck:      Thyroid: No thyromegaly.      Vascular: No JVD.   Cardiovascular:      Rate and Rhythm: Normal rate and regular rhythm.      Heart sounds: Normal heart sounds. No murmur heard.    No friction rub. No gallop.   Pulmonary:      Effort: Pulmonary effort is normal. No respiratory distress.      Breath sounds: Normal breath sounds. No wheezing or rales.   Abdominal:      General: Bowel sounds are normal. There is no distension.      Palpations: Abdomen is soft. There is no mass.      Tenderness: There is no abdominal tenderness. There is no guarding or rebound.      Hernia: No hernia is present.   Musculoskeletal:         General: No deformity. Normal range of motion.      Cervical back: Normal range of motion and neck supple.      Right lower leg: No edema.      Left lower leg: No edema.   Skin:     General: Skin is warm and dry.      Coloration: Skin is jaundiced.   Neurological:      Mental Status: He is alert and oriented to person, place, and time.      Cranial Nerves: No cranial nerve deficit.   Psychiatric:         Behavior: Behavior normal.       Significant Labs: All pertinent labs within the past 24 hours have been reviewed.  CBC:   Recent Labs   Lab 08/06/22  0314 08/07/22  0543   WBC 5.46 6.36   HGB 7.1* 7.8*   HCT 21.5* 24.4*   PLT 57* 72*       CMP:   Recent Labs   Lab  08/06/22  0314 08/07/22  0543   * 132*   K 3.7 3.5    102   CO2 26 24   GLU 87 92   BUN 25* 21*   CREATININE 0.8 0.9   CALCIUM 6.9* 7.1*   PROT 3.9* 4.2*   ALBUMIN 2.0* 2.0*   BILITOT 15.3* 18.0*   ALKPHOS 76 89   * 233*   * 300*   ANIONGAP 5* 6*       Significant Imaging: I have reviewed all pertinent imaging results/findings within the past 24 hours.      Assessment/Plan:      * Bleeding esophageal varices  Presented to outside hospital with acute hematemesis due to bleeding EV. Hgb low 2.6 at outside hospital and received total 5 units packed PRBC transfusion   - s/p EGD at outside hospital with banding of EV   - Hgb has remained stable, no additional episodes of hematemesis  - Completed octreotide for 72 hrs.  - continue ciprofloxacin for 5 days total therapy, ends today.        Iron deficiency  Continue iron supplementation      Hepatic encephalopathy  Reportedly had confusion at outside hospital   - Continue with lactulose   - Currently mentation is AOx4, unclear if HE at OSH was first episode or not.      Thrombocytopenia  Chronic,stable  2/2 underlying liver disease   - no signs of active bleeding   - transfuse platelet prn < 50 in setting of recent severe GI bleeding   - avoid antiplatelet agent use       Coagulopathy  INR elevated to 2.9, due to liver cirrhosis   - vitamin K daily for 3 doses      Decompensated hepatic cirrhosis  MELD-Na score: 27 at 8/7/2022  5:43 AM  MELD score: 25 at 8/7/2022  5:43 AM  Calculated from:  Serum Creatinine: 0.9 mg/dL (Using min of 1 mg/dL) at 8/7/2022  5:43 AM  Serum Sodium: 132 mmol/L at 8/7/2022  5:43 AM  Total Bilirubin: 18.0 mg/dL at 8/7/2022  5:43 AM  INR(ratio): 2.0 at 8/7/2022  5:43 AM  Age: 36 years  Decompensated etoh cirrhosis. Long history of drinking and quit drinking ~ 6 weeks ago per patient. Longest period of sobriety was 3-4 months but that was 5-6 years ago. He uses etoh as a coping mechanism for his depressive episodes tied to  his bipolar diagnosis.  - Hepatology following, not a current transplant candidate.  - Additional w/u for other causes of cirrhosis at an early age ordered.    Acute posthemorrhagic anemia  - see above under bleeding esophageal varices for management   - Patient also iron deficient, started ferrous sulfate        VTE Risk Mitigation (From admission, onward)         Ordered     IP VTE LOW RISK PATIENT  Once         08/05/22 0653     Place sequential compression device  Until discontinued         08/05/22 0653                Discharge Planning   ENIO: 8/9/2022     Code Status: Full Code   Is the patient medically ready for discharge?: No    Reason for patient still in hospital (select all that apply): Patient trending condition  Discharge Plan A: Home with family                  Taryn Linares MD  Department of Hospital Medicine   [facility] - Telemetry Stepdown

## 2022-08-07 NOTE — ASSESSMENT & PLAN NOTE
- see above under bleeding esophageal varices for management   - Patient also iron deficient, started ferrous sulfate

## 2022-08-07 NOTE — SUBJECTIVE & OBJECTIVE
Interval History: No acute issues overnight. Patient reports that he is doing well. He continues to feel better. Looking forward to being discharged tomorrow. MELD labs stable.    Review of Systems   Constitutional:  Negative for chills and fever.   HENT:  Negative for congestion and sore throat.    Eyes:  Negative for photophobia and visual disturbance.   Respiratory:  Negative for cough and shortness of breath.    Cardiovascular:  Negative for chest pain and palpitations.   Gastrointestinal:  Negative for abdominal pain, blood in stool, constipation, diarrhea, nausea and vomiting.   Endocrine: Negative for cold intolerance and heat intolerance.   Genitourinary:  Negative for dysuria and hematuria.   Musculoskeletal:  Negative for arthralgias and myalgias.   Skin:  Negative for rash and wound.   Allergic/Immunologic: Negative for environmental allergies and food allergies.   Neurological:  Negative for seizures and numbness.   Hematological:  Negative for adenopathy. Does not bruise/bleed easily.   Psychiatric/Behavioral:  Negative for hallucinations and suicidal ideas.    Objective:     Vital Signs (Most Recent):  Temp: 98.2 °F (36.8 °C) (08/07/22 0819)  Pulse: 65 (08/07/22 0819)  Resp: 16 (08/07/22 0819)  BP: (!) 109/56 (08/07/22 0819)  SpO2: 95 % (08/07/22 0819) Vital Signs (24h Range):  Temp:  [98 °F (36.7 °C)-99.1 °F (37.3 °C)] 98.2 °F (36.8 °C)  Pulse:  [65-70] 65  Resp:  [16-20] 16  SpO2:  [94 %-96 %] 95 %  BP: ()/(53-72) 109/56     Weight: 78.2 kg (172 lb 6.4 oz)  Body mass index is 23.38 kg/m².    Intake/Output Summary (Last 24 hours) at 8/7/2022 1019  Last data filed at 8/7/2022 0900  Gross per 24 hour   Intake 700 ml   Output --   Net 700 ml        Physical Exam  Constitutional:       Appearance: He is well-developed.   HENT:      Head: Normocephalic and atraumatic.   Eyes:      General: Scleral icterus present.      Conjunctiva/sclera: Conjunctivae normal.      Pupils: Pupils are equal, round, and  reactive to light.   Neck:      Thyroid: No thyromegaly.      Vascular: No JVD.   Cardiovascular:      Rate and Rhythm: Normal rate and regular rhythm.      Heart sounds: Normal heart sounds. No murmur heard.    No friction rub. No gallop.   Pulmonary:      Effort: Pulmonary effort is normal. No respiratory distress.      Breath sounds: Normal breath sounds. No wheezing or rales.   Abdominal:      General: Bowel sounds are normal. There is no distension.      Palpations: Abdomen is soft. There is no mass.      Tenderness: There is no abdominal tenderness. There is no guarding or rebound.      Hernia: No hernia is present.   Musculoskeletal:         General: No deformity. Normal range of motion.      Cervical back: Normal range of motion and neck supple.      Right lower leg: No edema.      Left lower leg: No edema.   Skin:     General: Skin is warm and dry.      Coloration: Skin is jaundiced.   Neurological:      Mental Status: He is alert and oriented to person, place, and time.      Cranial Nerves: No cranial nerve deficit.   Psychiatric:         Behavior: Behavior normal.       Significant Labs: All pertinent labs within the past 24 hours have been reviewed.  CBC:   Recent Labs   Lab 08/06/22  0314 08/07/22  0543   WBC 5.46 6.36   HGB 7.1* 7.8*   HCT 21.5* 24.4*   PLT 57* 72*       CMP:   Recent Labs   Lab 08/06/22  0314 08/07/22  0543   * 132*   K 3.7 3.5    102   CO2 26 24   GLU 87 92   BUN 25* 21*   CREATININE 0.8 0.9   CALCIUM 6.9* 7.1*   PROT 3.9* 4.2*   ALBUMIN 2.0* 2.0*   BILITOT 15.3* 18.0*   ALKPHOS 76 89   * 233*   * 300*   ANIONGAP 5* 6*       Significant Imaging: I have reviewed all pertinent imaging results/findings within the past 24 hours.

## 2022-08-07 NOTE — ASSESSMENT & PLAN NOTE
Presented to outside hospital with acute hematemesis due to bleeding EV. Hgb low 2.6 at outside hospital and received total 5 units packed PRBC transfusion   - s/p EGD at outside hospital with banding of EV   - Hgb has remained stable, no additional episodes of hematemesis  - Completed octreotide for 72 hrs.  - continue ciprofloxacin for 5 days total therapy, ends today.

## 2022-08-07 NOTE — CONSULTS
[facility]  DEPARTMENT OF PSYCHIATRY    ADDICTION CONSULT INITIAL EVALUATION     SITE: [facility] [address]    8/7/2022 4:33 PM  Porfirio Wilson  1985  [id number]    DATE OF ADMISSION: 8/5/2022  3:19 AM  LENGTH OF STAY: 2 days    EXAMINING PRACTITIONER: Carlos Mann MD    Inpatient consult to Psychiatry  Consult performed by: Carlos Mann MD  Consult ordered by: Taryn Linares MD          SINGLETON:     Y=Yes, N=No, U=Unknown (e.g. Unable to Assess, Not Assessed, Indeterminate), A=Ambiguity/Uncertainty of Accuracy Exists, [] N/A=Non-Applicable  I[x][]I=Yes/Present    I[][x]I=No/Absent    I[][]I=Unknown (e.g., Unable to Assess, Not Assessed, Indeterminate)        CHIEF COMPLAINT:     Patient Name: Porfirio Wilson  YOB: 1985  MRN: [id number]    Porfirio Wilson is a 36 y.o. male who is seen today for an initial psychiatric evaluation by the addiction psychiatry consult service.    Porfirio Wilson presents with the chief complaint of: problematic substance use/abuse and alcohol and/or drug addiction    CHART REVIEW:     Available documentation has been reviewed, and pertinent elements of the chart have been incorporated into this evaluation where appropriate.       Per Primary Team:    Mr. Wilson is a 36-year-old male with a history of alcohol abuse, cirrhosis, pancreatitis, and prior variceal bleed who presents as a transfer from [facility] for management of acute decompensated alcoholic liver cirrhosis and bleeding of esophageal varices. Patient was admitted to outside hospital on August 3 with hematemesis.  He was transferred there from [facility] after being found hypotensive with hematemesis and significant anemia (hemoglobin around 2.6).  He received total 5 units of packed red blood cells, FFP, Protonix infusion, and octreotide infusion.  He was transferred to [facility] where he was seen by Gastroenterology.  No hematemesis noted since he  "has been at [facility].  EGD showed large esophageal varices with successful band ligation.  Stomach and duodenum were unremarkable.  He was treated with aztreonam, Flagyl, lactulose, octreotide infusion, Protonix, Carafate, Xifaxan.  Referring provider noted patient is awake but confused/disoriented.        PRESENTATION:     HISTORY OF PRESENT ILLNESS:       Patient found sitting up comfortably on side of bed. Calm, cooperative, pleasant, engaged in conversation throughout interview with linear and organized thought process. Patient explains that his alcohol use has been worsening ever since he first started drinking regularly at age 22. He typically drinks on a near-daily basis, up to a pint of vodka per day. He also has a history of regular cannabis use, but has not used recently as he has not been able to obtain any marijuana. 5-6 years ago, patient presented to hospital in [place] with suicidal thoughts in the context of heavy drinking; he was hospitalized for a week and started on a medication he describes as "an antipsychotic that can be used as an antidepressant." From that hospitalization he went to rehab at [facility] in the [place] area for 45 days. He remained sober for a few months after completing that program. He had a girlfriend at that time who would also drink heavily. 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 He admits to drinking to the point of belligerence and getting into fights with people. He acknowledges that alcohol use has resulted in his current medical situation. His last drink was approximately 2 weeks ago. He has experienced mild withdrawals in the past, usually in the form of increased anxiety and/or tremulousness. He reports a history of "manic depression", but denies any history of manic or hypomanic symptoms when asked specifically. Does endorse a history of depressed mood, poor sleep, poor appetite, and feeling as though " "he is a burden and that others would be better off without him. He denies active suicidal ideation, however. Denies HI/AVH. He is open to going to rehab again and understands that he will put his health into serious jeopardy if he were to continue drinking alcohol.     COLLATERAL:     Patient information was obtained from patient.    Also present with the patient at the time of the evaluation: patient evaluated alone.    RELIABILITY, ACCURACY & AGENCY:     The patient is deemed to be a historian of unknown reliability and accuracy.    Inconsistencies between patient reporting, collateral information, and/or chart review are absent.    Legal Status: Non Applicable      ADDICTION:     SUBSTANCE USE:     I[]I Patient denies any substance use history, and none is known.  I[]I Patient unable or unwilling to provide any substance use history.    [] Y  [] N  [] U  [] Current  [x] Former  Nicotine Use:   [x] Y  [] N  [] U  [] Current  [] Former  Alcohol Misuse/Abuse:   [x] Y  [] N  [] U  [] Current  [] Former  Illicit Drug Use/Misuse/Abuse:   [] Y  [x] N  [] U  [] Current  [] Former  Misuse/Abuse of Prescription Medications:     Current Alcohol Use:   [] Unknown    [] None    [] Rare    [] Social    [] Exceeds Recommended Health Limits    [] Binge Pattern    [x] Daily or Near Daily    [x] Physiologically Dependent    [] N/A  [] U  Average Consumption (e.g. type, quantity, frequency): pint liquor per day  [] N/A  [] U  Last drink: approximately 2 weeks ago    Substances Used (Lifetime):   [] Unknown    [] None    [x] Cannabis    [] Cocaine    [] Heroin   [] Opioids    [] Methamphetamine    [] Stimulants    [] Benzodiazepines    [] Other:     Tobacco Cessation ("Wants to Quit"): [x] N/A  [] Y  [] N  [] U  Additionally: N/A  Alcohol Cessation ("Wants to Quit"): [] N/A  [x] Y  [] N  [] U  Additionally: Interested in Quitting, Advised to Quit , Encouragement Provided, Resources Provided  Drug Cessation ("Wants to Quit"): [] N/A  " [] Y  [] N  [x] U  Additionally: N/A    Additional Factors, As Applicable:   [] Y  [x] N  [] U  Hx of IVDU:   [] Y  [x] N  [] U  Hx of Accidental Overdose:   [] Y  [x] N  [] U  Hx of DUI:   [] Y  [x] N  [] U  Hx of Complicated Withdrawal (i.e. Seizures and/or Delirium Tremens):   [] Y  [x] N  [] U  Hx of Known/Suspected Substance-Induced Psychiatric Disorder:   [] Y  [x] N  [] U  Hx of Detox:   [x] Y  [] N  [] U  Hx of Rehab:   [] Y  [x] N  [] U  Hx of Medication Assisted Treatment:   [x] Y  [] N  [] U  Twelve Step Program (or Equivalent) Involvement:   [] Y  [x] N  [] U  Currently Exhibits Signs of Intoxication:   [] Y  [x] N  [] U  Currently Exhibits Signs of Withdrawal:   [x] Y  [] N  [] U  Currently Sober:    [] Y  [x] N  [] U  Currently Active in Recovery:     Substance(s) of Choice: alcohol  Substances Used Currently/Recently: alcohol  Duration of Sobriety/Abstinence: few month period 5-6 years ago  Date of Last Use of Substances: approximately 2 weeks ago  Understands Need for Lifetime Sobriety: yes  Patient Aware of Biomedical Complications: yes  View/Acceptance of Twelve Step (or Similar) Programs: open  Spouse/Partner Consumption: denies  Social Support: yes  Ability to Adhere to Treatment Plan: modifiably low    Meets Criteria for Substance Use Disorder: [] N/A  [x] Y  [] N  [] U  Additionally: Active, Severe, By Patient Report  Acceptance of Addiction: [] N/A  [x] Y  [] N  [] U  Additionally: Good  Motivation to Pursue Treatment: [] N/A  [x] Y  [] N  [] U  Additionally: Low             Additional Relevant Substance Use History, As Applicable:     REVIEW OF SYSTEMS:     MEDICAL ROS:     Complete review of systems performed covering Constitutional, Eyes, ENT/Mouth, Cardiovascular, Respiratory, Gastrointestinal, Genitourinary, Musculoskeletal, Skin, Neurologic, Endocrine, Heme/Lymph, and Allergy/Immune.     Complete review of systems was negative    PSYCHIATRIC ROS:     I[]I Patient denies any pertinent  "Psychiatric ROS, and none is known.  I[]I Patient unable or unwilling to provide any Psychiatric ROS.    I[x][]I sleep disturbance:   I[x][]I appetite/weight change:   I[x][]I fatigue/anergia:   I[x][]I anhedonia/amotivation:   I[x][]I guilty/worthless/helpless/hopeless:   I[x][]I depressed mood:     I[][x]I inattention/distractibility:   I[][x]I irritability:     I[][x]I racing thoughts/pressured speech:   I[][x]I grandiosity:   I[][x]I elevated/persistent energy/activity:   I[][x]I impulsive behaviors:   I[][x]I risky behavior:   I[][x]I mood dysregulation/lability:     I[x][]I anxiety/worry:   I[][x]I panic attacks:   I[x][]I agoraphobia/social phobia:   I[][x]I somatic symptoms:   I[][x]I recurrent nightmares:   I[][x]I hypervigilance/startle:   I[][x]I avoidance:   I[][x]I intrusive thoughts/recurrent behaviors:      I[][x]I paranoia:   I[][x]I delusions:   I[][x]I hallucinations:     Additional Relevant Psychiatric ROS, As Applicable:       HISTORY:     PAST PSYCHIATRIC:     I[]I Patient denies any past psychiatric history, and none is known.  I[]I Patient unable or unwilling to provide any past psychiatric history.    [x] Y  [] N  [] U  "Have you been diagnosed with any psychiatric conditions?": "manic depression"  [] Y  [x] N  [] U  "Do you have a current psychiatric provider?":   [x] Y  [] N  [] U  Hx of Past Psychiatric Hospitalization(s): 5-6 years ago in [place] for SI, depression, alcohol use  [] Y  [x] N  [] U  Hx of Outpatient Psychiatric Treatment:   [] Y  [x] N  [] U  Hx of Psychotherapy:   [x] Y  [] N  [] U  Hx of Psychotropic Medication:  [] Y  [x] N  [] U  Hx of Prior Suicide Attempt(s):   [x] Y  [] N  [] U  Hx of Suicidal Ideation:   [] Y  [x] N  [] U  Hx of Homicidal Ideation:   [] Y  [x] N  [] U  Hx of Self-Injurious Behavior (Non-Suicidal):   [] Y  [x] N  [] U  Hx of Violence:   [] Y  [x] N  [] U  Documented Hx of Malingering:     Additional Relevant Past Psychiatric History, As " Applicable:     TRAUMA:     I[x]I Patient denies any history of trauma, abuse or neglect, and none is known.  I[]I Patient unable or unwilling to provide any history of trauma, abuse, or neglect.    [] Y  [] N  [] U  Hx of Trauma/Neglect:   [] Y  [] N  [] U  Hx of Physical Abuse:   [] Y  [] N  [] U  Hx of Sexual Abuse:       FAMILY:     [x] Yes  [] No  [] Unknown      Alcoholism on dad's side of family    SOCIAL:     I[]I Patient unable or unwilling to provide any social history.    Childhood/Developmental: unremarkable  Education: finished high school  Employment: , retail  Sexual Orientation/Gender: heterosexual  Relationships: single  Children/Dependents: none  Housing: with mother and sister in [place], [place]  Oriental orthodox/Spirituality: not asked  : no  Hobbies/Leisure: playing video games, reading books      LEGAL:     I[x]I Patient denies any legal history, and none is known.  I[]I Patient unable or unwilling to provide any legal history.    [] Y  [] N  [] U  Past Charges/Convictions:   [] Y  [] N  [] U  Current Charges:   [] Y  [] N  [] U  Hx of Incarceration:   [] Y  [] N  [] U  Currently on Probation, [place] or Diversion:     Additional Relevant Legal History, As Applicable:     MEDICAL:     The patient's past medical history has been reviewed and updated as appropriate within the electronic medical record system.    General Medical History:     [x] None/Denies  [] Unknown   Patient Active Problem List   Diagnosis    Bleeding esophageal varices    Acute posthemorrhagic anemia    Decompensated hepatic cirrhosis    Coagulopathy    Thrombocytopenia    Hepatic encephalopathy    Iron deficiency       NEUROLOGIC:     [] Y  [x] N  [] U  Hx of Seizure:   [] Y  [x] N  [] U  Hx of Significant Head Trauma (e.g., Loss of Consciousness, Concussion, Coma):      Additional Relevant Neurologic History, As Applicable:     MEDICATIONS:     Current Psychotropic Medications:     [x] None/Denies  [] Unknown        Scheduled and PRN Medications:   The electronic chart was reviewed and updated as appropriate.  See Medcard for details.    Current Facility-Administered Medications:     acetaminophen tablet 500 mg, 500 mg, Oral, Q6H PRN, Arup K. Aide, DO    albuterol-ipratropium 2.5 mg-0.5 mg/3 mL nebulizer solution 3 mL, 3 mL, Nebulization, Q4H PRN, Arup K. Aide, DO    aluminum-magnesium hydroxide-simethicone 200-200-20 mg/5 mL suspension 30 mL, 30 mL, Oral, QID PRN, Arup K. Aide, DO    ciprofloxacin HCl tablet 500 mg, 500 mg, Oral, Q12H, Taryn Linares MD, 500 mg at 08/07/22 0823    dextrose 10% bolus 125 mL, 12.5 g, Intravenous, PRN, Arup K. Aide, DO    dextrose 10% bolus 250 mL, 25 g, Intravenous, PRN, Arup KBelen Aide, DO    ferrous sulfate tablet 1 each, 1 tablet, Oral, Daily, Taryn Linares MD, 1 each at 08/07/22 0823    folic acid tablet 1 mg, 1 mg, Oral, Daily, Arkrysten Noble, DO, 1 mg at 08/07/22 0919    glucagon (human recombinant) injection 1 mg, 1 mg, Intramuscular, PRN, Arup K. Aide, DO    glucose chewable tablet 16 g, 16 g, Oral, PRN, Arup K. Aide, DO    glucose chewable tablet 24 g, 24 g, Oral, PRN, Arup K. Aide, DO    lactulose 20 gram/30 mL solution Soln 10 g, 10 g, Oral, TID, Arup K. Aide, DO, 10 g at 08/07/22 1403    melatonin tablet 6 mg, 6 mg, Oral, Nightly PRN, Arup KBelen Aide, DO    mupirocin 2 % ointment, , Nasal, BID, Taryn Linares MD, Given at 08/07/22 0824    naloxone 0.4 mg/mL injection 0.02 mg, 0.02 mg, Intravenous, PRN, Arup K. Aide, DO    ondansetron injection 4 mg, 4 mg, Intravenous, Q8H PRN, Sisi Noble, DO    pantoprazole EC tablet 40 mg, 40 mg, Oral, Daily, Taryn Linares MD, 40 mg at 08/07/22 0823    pneumoc 20-lucio conj-dip cr(PF) (PREVNAR-20 (PF)) injection Syrg 0.5 mL, 0.5 mL, Intramuscular, vaccine x 1 dose, Taryn Linares MD    propranoloL tablet 10 mg, 10 mg, Oral, BID, Taryn Linares MD, 10 mg at 08/07/22 0823    sodium chloride 0.9% flush 10 mL, 10 mL,  Intravenous, Q12H PRN, Sisi Noble DO    thiamine tablet 100 mg, 100 mg, Oral, Daily, Sisi Noble, DO, 100 mg at 08/07/22 0823    ALLERGIES:     Review of patient's allergies indicates:   Allergen Reactions    Ampicillin     Ceclor [cefaclor]     Penicillins        RISK:     Risk Mitigation Strategies were employed during this encounter, as was Safety Netting, whenever feasible relevant, and applicable.     FIREARMS:     Access to Firearms:     [] Yes  [x] No  [] Unknown      NOTE: patient counseled on gun safety  NOTE: patient counseled on increased risks associated with gun ownership      RISK PARAMETERS:     The following risk parameters were assessed during this evaluation:    [] Y  [x] N  [] U  [] A  Suicidal Ideation/Behavior:   [] Y  [x] N  [] U  [] A  Homicidal Ideation/Behavior:   [] Y  [x] N  [] U  [] A  Violence:   [] Y  [x] N  [] U  [] A  Self-Injurious Behavior:     [] Y  [x] N  [] U  [] A  Minimization of Symptoms Suspected/Evident:   [] Y  [x] N  [] U  [] A  Exaggeration of Symptoms Suspected/Evident:     CLINICAL RISK ASSESSMENT:     The patient was able to satisfactorily contract for safety and was noted to be future oriented.    Patient does not meet criteria for inpatient psychiatric hospitalization but is approaching threshold - can be safely managed in a less restrictive level of care or the community with appropriate support, monitoring, and interventions.    [] Dangerous to Self    []Dangerous to Others    [] Gravely Disabled      EXAMINATION:     Vitals:    08/07/22 0516 08/07/22 0819 08/07/22 1123 08/07/22 1538   BP: (!) 105/59 (!) 109/56 112/69 109/65   BP Location:  Right arm Left arm Left arm   Patient Position:  Lying Sitting Sitting   Pulse: 70 65 74 64   Resp: 20 16 20 20   Temp: 98 °F (36.7 °C) 98.2 °F (36.8 °C) 97.9 °F (36.6 °C) 98.8 °F (37.1 °C)   TempSrc:  Oral Oral Oral   SpO2: 96% 95% (!) 93% (!) 94%   Weight:       Height:           MENTAL STATUS EXAMINATION:     Mental  Status Exam:  Appearance: unremarkable, age appropriate  Behavior/Cooperation: normal, friendly and cooperative  Speech: normal tone, normal rate, normal pitch, normal volume  Mood: fine  Affect: normal  Thought Process: normal and logical  Thought Content: normal, no suicidality, no homicidality, delusions, or paranoia  Orientation: grossly intact, person, place, situation, day of week, month of year  Memory: Intact  Attention Span/Concentration: Normal  Insight: fair  Judgment: fair      ASSESSMENT:     A diagnostic psychiatric evaluation was performed and responsiveness to treatment was assessed.  The patient demonstrates adequate ability/capacity to respond to treatment.      INITIAL DIAGNOSES AND PROBLEMS:     Alcohol use disorder, severe  Major depressive disorder      PSYCHOSOCIAL FACTORS  Stressors (Psychosocial and Environmental): health and drug and alcohol.     STRENGTHS AND LIABILITIES   Strength: Patient accepts guidance/feedback, Strength: Patient is expressive/articulate., Strength: Patient is intelligent., Strength: Patient is motivated for change., Liability: Patient has poor health.    PLAN:     IMPRESSION AND RECOMMENDATIONS:     MANAGEMENT PLAN, TREATMENT GOALS, THERAPEUTIC TECHNIQUES/APPROACHES & CLINICAL REASONING    - Patient is interested in rehab, but would like to figure out whether or not he would prefer to go somewhere closer to home in [place], [place], as that is where his family is located. Will provide with list of resources in discharge instructions.  - Consult case management if patient would like assistance with rehab placement immediately following discharge  - Can start Remeron 15 mg nightly, for insomnia and decreased appetite  - No concern for alcohol withdrawal at this time     Shared medical decision making with the patient was employed (to the extent possible) when selecting, or considering but not selecting, proposed treatment and management options.    · Patient counseled on  "abstinence from alcohol and substances of abuse (illicit and prescription).  · Additional workup planned to address substance use disorder, in order to guide and refine ongoing management options, includes serial alcohol and drug laboratory testing (e.g. PETH, urine toxicology).  · Relapse prevention and motivational interviewing provided.  · Education provided on 12 step (and similar) recovery programs.      PRESCRIPTION DRUG MANAGEMENT   Prescription drug management was employed during the encounter, as medications were reviewed, recommended, and/or considered but not recommended.   The diagnosis, target symptoms, intended outcomes, and possible benefits and risks of medication, as well as alternatives (including no treatment), were discussed with this patient.   Possible expectable adverse effects of any proposed individual psychotropic agents, as well as the inherent unpredictability of treatment, were discussed with this patient.    Informed consent was obtained from the patient after a thorough discussion of the aforementioned points outlined above.   Counseling was provided on the importance of full compliance with any prescribed medication.    The patient's ability to understand, participate and engage in a conversation surrounding prescription drug management was deemed to be: sufficient    Information on psychotropic medication can be found at: [employer]: Information on Mental Health Medications    [place] [facility]  Site reviewed - YES  Consulting clinician was informed of the encounter and consult note.     Case discussed with the treatment team, including clinical impression, assessment, and treatment recommendations.    In cases of emergency, daily coverage provided by Acute/ER Psych MD, NP, or RACHELLE, with contact numbers located in [facility] On Call Schedule    Case discussed with staff addiction psychiatrist: MD Carlos Rodriguez Jr ([place]), " MD  [facility], PGY-II        DATA:     DIAGNOSTIC TESTING:     The chart was reviewed for recent diagnostic investigations, and pertinent results are noted below.    Recent Weights/BMI on File:    Wt Readings from Last 5 Encounters:   08/05/22 78.2 kg (172 lb 6.4 oz)   10/05/21 71.2 kg (157 lb)   09/21/21 85.7 kg (189 lb)       No data recorded    Current body mass index is 23.38 kg/m².      Most Recent BP/HR on File:    BP Readings from Last 1 Encounters:   08/07/22 109/65       Pulse Readings from Last 1 Encounters:   08/07/22 64         Most Recent EKG on File:    Results for orders placed or performed during the hospital encounter of 08/05/22   EKG 12-lead    Collection Time: 08/05/22 10:35 AM    Narrative    Test Reason : R94.31,    Vent. Rate : 064 BPM     Atrial Rate : 064 BPM     P-R Int : 122 ms          QRS Dur : 090 ms      QT Int : 464 ms       P-R-T Axes : 049 017 052 degrees     QTc Int : 478 ms    Normal sinus rhythm  Normal ECG  No previous ECGs available  Confirmed by MARIA D CARVAJAL MD ([id number]) on 8/5/2022 1:56:54 PM    Referred By: DURAN DE LA PAZ           Confirmed By:MARIA D CARVAJAL MD           PERTINENT LABORATORY RESULTS    Most Recent Electrolytes on File:  (i.e. Na, K, Ca, Phos, Mg, CO2)    Sodium (mmol/L)   Date Value   08/07/2022 132 (L)   10/05/2021 139     Potassium (mmol/L)   Date Value   08/07/2022 3.5   10/05/2021 3.7     Calcium (mg/dL)   Date Value   08/07/2022 7.1 (L)   10/05/2021 8.7     Phosphorus (mg/dL)   Date Value   08/07/2022 2.7     Magnesium (mg/dL)   Date Value   08/07/2022 1.9     CO2 (mmol/L)   Date Value   08/07/2022 24   10/05/2021 24         Most Recent Blood Glucose & HgA1c on File:    Glucose (mg/dL)   Date Value   08/07/2022 92     Hemoglobin A1C (%)   Date Value   08/05/2022 4.5         Most Recent Renal Function Tests on File:  (i.e. Cr, BUN, GFR, Urine Specific Gravity, Urine Protein)    Creatinine (mg/dL)   Date Value   08/07/2022 0.9   10/05/2021  0.74     BUN (mg/dL)   Date Value   08/07/2022 21 (H)   10/05/2021 8.0         Most Recent Liver Function Tests on File:  (i.e. AST, ALT, Total Bili, Ammonia, Albumin, INR)    AST (U/L)   Date Value   08/07/2022 233 (H)   10/05/2021 78 (H)     ALT (U/L)   Date Value   08/07/2022 300 (H)     Total Bilirubin (mg/dL)   Date Value   08/07/2022 18.0 (H)     Ammonia (umol/L)   Date Value   08/05/2022 41     Albumin (g/dL)   Date Value   08/07/2022 2.0 (L)   10/05/2021 3.2 (L)     INR (no units)   Date Value   08/07/2022 2.0 (H)         Most Recent Pancreatic Function Tests on File:  (i.e. Amylase, Lipase)    Lipase (U/L)   Date Value   08/05/2022 39         Most Recent Blood Counts on File:  (i.e. WBC, ANC, RBC, MCV, Platelets)    WBC   Date Value   08/07/2022 6.36 K/uL   10/05/2021 5.49 X 10 3/ul     Gran # (ANC) (K/uL)   Date Value   08/07/2022 4.6     Gran % (%)   Date Value   08/07/2022 72.1     RBC (M/uL)   Date Value   08/07/2022 2.92 (L)     MCV   Date Value   08/07/2022 84 fL   10/05/2021 97.2 fl (H)     Platelets (K/uL)   Date Value   08/07/2022 72 (L)         Most Recent Thyroid/Parathyroid Function Tests on File:  (i.e. TSH, Free T4, Total T4, Free T3, Total T3, PTH)    TSH (uIU/mL)   Date Value   10/05/2021 4.28 (H)         Most Recent Lipid Panel Results on File:  (i.e. Cholesterol, Triglycerides, LDH, HDL)    Cholesterol (mg/dL)   Date Value   10/05/2021 234 (H)     Triglycerides (mg/dL)   Date Value   10/05/2021 80     LDL Cholesterol (mg/dL)   Date Value   10/05/2021 141.0 (H)     HDL (mg/dL)   Date Value   10/05/2021 77         Most Recent CPK & Prolactin on File:    No results found for: CPK, PROLACTIN      Most Recent Vitamin Levels on File:  (i.e. Vitamin B12, Folate, Vitamin D)    No results found for: [id number], FOLATE, [id number]       Most Recent Infection Disease Panel on File:  (i.e. Syphilis, Hepatitis C, Hepatitis B, HIV)     Hepatitis B Surface Ag (no units)   Date Value   08/05/2022  Negative     Hepatitis C Ab (no units)   Date Value   08/05/2022 Negative     HIV 1/2 Ag/Ab (no units)   Date Value   08/05/2022 Negative         Pregnancy Screenings:    No results found for: PREGTESTUR, PREGNANCYTES, HCGQUANT      Lithium & Valproate Levels on File:    No results found for: LITHIUM    No results found for: VALPROATE      Most Recent Carbamazepine & Lamotrigine Levels on File:    No results found for: CBMZ, LAMOTRIGINE      Most Recent Clozapine Level on File:    No results found for: CLOZAPINE, NORCLOZAP, CLOZNORCLOZ      Results on File for Alcohol Screens (Blood, Urine):    Alcohol, Serum (mg/dL)   Date Value   08/05/2022 <10       No results found for: PCDSOALCOHOL      Results on File for Urine Drug Screens (Benzodiazepines, Barbiturates, Methadone, Opiates, Cocaine, Amphetamines, THC, PCP):    No results found for: PCDSOBENZOD  No results found for: BARBITURATES  No results found for: PCDSCOMETHA  No results found for: OPIATESCREEN  No results found for: COCAINEMETAB  No results found for: AMPHETAMINES  No results found for: MARIJUANATHC  No results found for: PCDSOPHENCYN      Most Recent Results for Buprenorphine Testing:    No results found for: BUPRENORPH, NORBUPRENOR      Results on File for Phosphatidylethanol (PETH):    No results found for: PETH  No results found for: [id number]  No results found for: [id number]      Results on File for Ethyl Glucuronide (EtG) and Ethyl Sulfate (EtS):    No results found for: THEYLGLUCU, ETHYLSULF      Most Recent Results on File for Alcohol Biomarkers (GGT, CDT):    No results found for: GGT, CDT        RELEVANT NEUROLOGIC IMAGING:  (i.e. CT/MRI brain)      CT HEAD WITHOUT CONTRAST    No results found for this or any previous visit.      MRI HEAD WITHOUT CONTRAST    No results found for this or any previous visit.      MRI HEAD WITH AND WITHOUT CONTRAST    No results found for this or any previous visit.

## 2022-08-07 NOTE — DISCHARGE INSTRUCTIONS
MENTAL HEALTH/ADDICTIVE DISORDERS  REFERRAL RECOMMENDATIONS    Suboxone    [facility] - [phone]  (can do Sublocade)  [address]  [place] [address]    [facility] (can do Sublocade)  [address] [place]  [address]    [facility]  [address] [facility], [place] [address]  [phone]; [phone]    [facility]   [address], [place]    [phone]    [facility] ([facility])  [address], [place], [place]  [phone]    [facility]  [address] [facility] [place] [address]  [phone]    Jorge Warren DNP - (can do sublocade)  [phone]  [address]      Methadone Maintenance    [facility]   [place] - [address] [place] [address], [phone]  [facility] [address], [place], [place] [address] [phone]      I. AA ([phone]) [email or web address] or NA ([phone])    II. [facility] [phone], option 2    III. Other Places for Outpatient Addictive Disorders and Mental Health Treatment in [facility]:    [facility] -[phone]; [address] [facility]    [facility] ([place], [place], [place]; accepts Medicaid, commercial insurance) [phone]    [facility] ([place]) [phone], [address] [facility] Health [phone]; Crisis [phone] - Call for options A-E:    [facility], [address] [place] [address]    [facility], [address] [place] [address]    [facility], [address] [place] [address],    [facility] [place], 2nd floor [address] [place] [address]    [facility], [address] [facility] , [place] [address]    [facility], [place] [address]    [facility], [address] [place] [address], [phone]    [facility],  [place] [phone]    [facility] (Mental & General), [address] [facility], [phone]    [facility] (Mental & General Health, not only HIV+, 3 [place] locations) [phone]    [facility], [address] [place], [place], [address], [phone]     [facility], [address] [facility] [address] [place], [phone], [phone]    [facility], [address], [place], [place]   [phone]        IV. Addiction and Mental Health Treatment in Other [facility]:    [facility], [address] [place], [phone]    [facility], [phone], [address] [facility], [phone]    Health system [facility]. [address] [place] [address], [phone]    [facility], [address] [facility], [phone]    [facility], [address] [facility], [phone] ([facility])    [facility], [address] [facility], [phone] ([facility])    [facility], [address] [facility], [address], [place], [phone] ([place], [place], [facility])    [facility], [address] [facility], [phone] ([facility])    [facility] Hotline [phone], [phone]    [facility], [address] [facility], [facility], [address] [facility], [facility], [address] [facility], [facility], [address] [facility] [address] [facility], [address] [facility]. [address], [facility] Human Services, [address], [place] [phone]    [facility], [address] [facility] [phone]    [facility]  "Center for [facility] Recovery, [address] [facility], [phone]    [facility] for [facility] Recovery, [address] [facility], [phone]      V. Residential Addictive Disorders Treatment (call every day until you get in):    [facility] [address] [place], [phone]    [facility] [address]   [phone] (intake by appointment only)    [facility] (men only) [address] [facility], [phone]    [facility], [address] [place] [address], men's program [phone], women's program, [phone]    [facility], [address] [facility], [phone]    [facility] (Female only) [address] [facility] , [phone]    [facility] (IOP, residential, low cost, [place]) [address] [place], [phone]    [facility] (Men only, [phone]), [address] [facility], [place]    [facility] [place] (Pregnant/women with or without children, [phone])    [facility] (Women only), [address] [place] [address], [phone]    [facility] (men only)[facility] [phone]    VI. Inpatient Rehabs (out of area)    [place], [place], [place], [phone]     [facility], [phone]    [facility], [phone]    [facility], [phone]    [place], [place] ([phone])    [place] ([facility]) [phone]    VII. [place] Speaking:  Información de la reunión de Alcohólicos Anónimos  Rig[facility]to [facility], 10:00 am  Habla español  Esta reunión está abierta y cualquiera puede asistir.    [place] speaking Alcoholics anonymous meetings:  El "Rigoberto [place] AA Skype" es un rigoberto on line de Alcohólicos Anónimos en español. El rigoberto es mike, gratuito y virtual a través de Skype Audio. El rigoberto funciona mediante agustín llamada grupal de voz, por lo que no se utiliza la videollamada, ni se pueden nohemy las imágenes o rostros de los participantes. Hace silva años y medio abrimos el primer Rigoberto de AA por Skype en [place], zofia actualmente " asisten personas desde [place], [place], [place], [place], [place], [place], [place], [place], [place], [place], [place] y [place], entre otros.    El abhishek es muy útil para los alcohólicos que residen en lugares donde no se celebran reuniones de AA, o residen en lugares donde las reuniones de AA son un número limitado de días a la semana, o para aquellos compañeros que se hayan de viaje o que, por cualquier motivo, se hayan convalecientes y no pueden desplazarse. Todos los días nos reuniones a las 21:00 (hora española)    Podéis obtener más información sobre el abhishek y kirstie sesiones en la página web [email or web address]    VIII. Suboxone Doctors in Other regions    [place], [place]:    [facility] - [address] [place] [address] - Tel: [phone]    [place] - [address] [place] [address] - Tel: [phone]    Elpidio Paredes - [address] [employer] [address] - Tel: [phone]    Nato Patel - [address] [employer] [address] - Tel: [phone]    Tito Horvath - [address] [place] [address] Tel:[phone]    [place], [place]:     Dr. Maday Forte and Dr. All Gorman - [address] [place], [place] - Tel: [phone]    Dr. Fabi Welch - [address] [place] - Tel: [phone]    Dr. Arden Monteiro - Tel: [phone]    Dr. Ludin Haji - [facility] - [phone]        VIII. In case of suicidal thinking, call the COPE LINE [phone] / [phone]

## 2022-08-08 VITALS
OXYGEN SATURATION: 96 % | DIASTOLIC BLOOD PRESSURE: 55 MMHG | HEART RATE: 76 BPM | HEIGHT: 72 IN | SYSTOLIC BLOOD PRESSURE: 107 MMHG | BODY MASS INDEX: 23.35 KG/M2 | WEIGHT: 172.38 LBS | RESPIRATION RATE: 18 BRPM | TEMPERATURE: 98 F

## 2022-08-08 LAB
ALBUMIN SERPL BCP-MCNC: 1.9 G/DL (ref 3.5–5.2)
ALP SERPL-CCNC: 100 U/L (ref 55–135)
ALT SERPL W/O P-5'-P-CCNC: 260 U/L (ref 10–44)
ANA SER QL IF: NORMAL
ANION GAP SERPL CALC-SCNC: 6 MMOL/L (ref 8–16)
AST SERPL-CCNC: 197 U/L (ref 10–40)
BASOPHILS # BLD AUTO: 0.04 K/UL (ref 0–0.2)
BASOPHILS NFR BLD: 0.4 % (ref 0–1.9)
BILIRUB SERPL-MCNC: 18.4 MG/DL (ref 0.1–1)
BUN SERPL-MCNC: 17 MG/DL (ref 6–20)
CALCIUM SERPL-MCNC: 7.1 MG/DL (ref 8.7–10.5)
CHLORIDE SERPL-SCNC: 102 MMOL/L (ref 95–110)
CO2 SERPL-SCNC: 22 MMOL/L (ref 23–29)
CREAT SERPL-MCNC: 0.9 MG/DL (ref 0.5–1.4)
DIFFERENTIAL METHOD: ABNORMAL
EOSINOPHIL # BLD AUTO: 0.3 K/UL (ref 0–0.5)
EOSINOPHIL NFR BLD: 3 % (ref 0–8)
ERYTHROCYTE [DISTWIDTH] IN BLOOD BY AUTOMATED COUNT: 23.3 % (ref 11.5–14.5)
EST. GFR  (NO RACE VARIABLE): >60 ML/MIN/1.73 M^2
GLIADIN PEPTIDE IGA SER-ACNC: 14 UNITS
GLIADIN PEPTIDE IGG SER-ACNC: 2 UNITS
GLUCOSE SERPL-MCNC: 86 MG/DL (ref 70–110)
HCT VFR BLD AUTO: 25.1 % (ref 40–54)
HGB BLD-MCNC: 7.9 G/DL (ref 14–18)
IGA SERPL-MCNC: 420 MG/DL (ref 70–400)
IMM GRANULOCYTES # BLD AUTO: 0.05 K/UL (ref 0–0.04)
IMM GRANULOCYTES NFR BLD AUTO: 0.5 % (ref 0–0.5)
INR PPP: 1.9 (ref 0.8–1.2)
LYMPHOCYTES # BLD AUTO: 0.9 K/UL (ref 1–4.8)
LYMPHOCYTES NFR BLD: 9.3 % (ref 18–48)
MAGNESIUM SERPL-MCNC: 1.9 MG/DL (ref 1.6–2.6)
MCH RBC QN AUTO: 27.2 PG (ref 27–31)
MCHC RBC AUTO-ENTMCNC: 31.5 G/DL (ref 32–36)
MCV RBC AUTO: 87 FL (ref 82–98)
MONOCYTES # BLD AUTO: 1.2 K/UL (ref 0.3–1)
MONOCYTES NFR BLD: 13.1 % (ref 4–15)
NEUTROPHILS # BLD AUTO: 6.8 K/UL (ref 1.8–7.7)
NEUTROPHILS NFR BLD: 73.7 % (ref 38–73)
NRBC BLD-RTO: 0 /100 WBC
PETH 16:0/18.1 (POPETH): 99 NG/ML
PETH 16:0/18.2 (PLPETH): 101 NG/ML
PHOSPHATE SERPL-MCNC: 2.9 MG/DL (ref 2.7–4.5)
PLATELET # BLD AUTO: 81 K/UL (ref 150–450)
PMV BLD AUTO: ABNORMAL FL (ref 9.2–12.9)
POTASSIUM SERPL-SCNC: 3.5 MMOL/L (ref 3.5–5.1)
PROT SERPL-MCNC: 4.2 G/DL (ref 6–8.4)
PROTHROMBIN TIME: 19.1 SEC (ref 9–12.5)
RBC # BLD AUTO: 2.9 M/UL (ref 4.6–6.2)
SODIUM SERPL-SCNC: 130 MMOL/L (ref 136–145)
TTG IGA SER-ACNC: 11 UNITS
TTG IGG SER-ACNC: 3 UNITS
WBC # BLD AUTO: 9.23 K/UL (ref 3.9–12.7)

## 2022-08-08 PROCEDURE — 84100 ASSAY OF PHOSPHORUS: CPT | Performed by: HOSPITALIST

## 2022-08-08 PROCEDURE — 80053 COMPREHEN METABOLIC PANEL: CPT | Performed by: HOSPITALIST

## 2022-08-08 PROCEDURE — 25000003 PHARM REV CODE 250: Performed by: STUDENT IN AN ORGANIZED HEALTH CARE EDUCATION/TRAINING PROGRAM

## 2022-08-08 PROCEDURE — 99239 HOSP IP/OBS DSCHRG MGMT >30: CPT | Mod: ,,, | Performed by: STUDENT IN AN ORGANIZED HEALTH CARE EDUCATION/TRAINING PROGRAM

## 2022-08-08 PROCEDURE — 85610 PROTHROMBIN TIME: CPT | Performed by: HOSPITALIST

## 2022-08-08 PROCEDURE — 83735 ASSAY OF MAGNESIUM: CPT | Performed by: HOSPITALIST

## 2022-08-08 PROCEDURE — 85025 COMPLETE CBC W/AUTO DIFF WBC: CPT | Performed by: HOSPITALIST

## 2022-08-08 PROCEDURE — 36415 COLL VENOUS BLD VENIPUNCTURE: CPT | Performed by: HOSPITALIST

## 2022-08-08 PROCEDURE — 25000003 PHARM REV CODE 250: Performed by: HOSPITALIST

## 2022-08-08 PROCEDURE — 99239 PR HOSPITAL DISCHARGE DAY,>30 MIN: ICD-10-PCS | Mod: ,,, | Performed by: STUDENT IN AN ORGANIZED HEALTH CARE EDUCATION/TRAINING PROGRAM

## 2022-08-08 RX ORDER — FERROUS SULFATE 325(65) MG
325 TABLET, DELAYED RELEASE (ENTERIC COATED) ORAL DAILY
Qty: 30 TABLET | Refills: 2 | Status: SHIPPED | OUTPATIENT
Start: 2022-08-08

## 2022-08-08 RX ORDER — LANOLIN ALCOHOL/MO/W.PET/CERES
100 CREAM (GRAM) TOPICAL DAILY
Qty: 30 TABLET | Refills: 2 | Status: SHIPPED | OUTPATIENT
Start: 2022-08-08

## 2022-08-08 RX ORDER — FOLIC ACID 1 MG/1
1 TABLET ORAL DAILY
Qty: 30 TABLET | Refills: 2 | Status: SHIPPED | OUTPATIENT
Start: 2022-08-08 | End: 2023-08-08

## 2022-08-08 RX ORDER — PROPRANOLOL HYDROCHLORIDE 10 MG/1
10 TABLET ORAL 2 TIMES DAILY
Qty: 60 TABLET | Refills: 2 | Status: ON HOLD | OUTPATIENT
Start: 2022-08-08 | End: 2022-09-18 | Stop reason: HOSPADM

## 2022-08-08 RX ORDER — LACTULOSE 10 G/15ML
10 SOLUTION ORAL; RECTAL 3 TIMES DAILY
Qty: 1350 ML | Refills: 2 | Status: SHIPPED | OUTPATIENT
Start: 2022-08-08

## 2022-08-08 RX ADMIN — FOLIC ACID 1 MG: 1 TABLET ORAL at 09:08

## 2022-08-08 RX ADMIN — PROPRANOLOL HYDROCHLORIDE 10 MG: 10 TABLET ORAL at 09:08

## 2022-08-08 RX ADMIN — MUPIROCIN: 20 OINTMENT TOPICAL at 09:08

## 2022-08-08 RX ADMIN — Medication 100 MG: at 09:08

## 2022-08-08 RX ADMIN — LACTULOSE 10 G: 20 SOLUTION ORAL at 09:08

## 2022-08-08 RX ADMIN — PANTOPRAZOLE SODIUM 40 MG: 40 TABLET, DELAYED RELEASE ORAL at 09:08

## 2022-08-08 RX ADMIN — FERROUS SULFATE TAB 325 MG (65 MG ELEMENTAL FE) 1 EACH: 325 (65 FE) TAB at 09:08

## 2022-08-08 NOTE — PROGRESS NOTES
Spoke with patient.  Reports he has already left the hospital.  In[facility] on Monday 8/15 and then as per Dr. Coronado's orders.  Patient has a f/u with Dr. Coronado on 9/14.  Lab order and appointment slip will be mailed to patient.

## 2022-08-08 NOTE — DISCHARGE SUMMARY
[facility] - Telemetry [facility] Medicine  Discharge Summary      Patient Name: Porfirio Wilson  MRN: [id number]  Patient Class: IP- Inpatient  Admission Date: 8/5/2022  Hospital Length of Stay: 3 days  Discharge Date and Time:  08/08/2022 11:49 AM  Attending Physician: Taryn Linares MD   Discharging Provider: Taryn Linares MD  Primary Care Provider: Primary Doctor [facility] Medicine Team: [facility] MED  Taryn Linares MD    HPI:   Mr. Wilson is a 36-year-old male with a history of alcohol abuse, cirrhosis, pancreatitis, and prior variceal bleed who presents as a transfer from [facility] for management of acute decompensated alcoholic liver cirrhosis and bleeding of esophageal varices. Patient was admitted to outside hospital on August 3 with hematemesis.  He was transferred [facility] after being found hypotensive with hematemesis and significant anemia (hemoglobin around 2.6).  He received total 5 units of packed red blood cells, FFP, Protonix infusion, and octreotide infusion.  He was transferred to [facility] where he was seen by Gastroenterology.  No hematemesis noted since he has been at [facility].  EGD showed large esophageal varices with successful band ligation.  Stomach and duodenum were unremarkable.  He was treated with aztreonam, Flagyl, lactulose, octreotide infusion, Protonix, Carafate, Xifaxan.  Referring provider noted patient is awake but confused/disoriented.         August 4:  COVID negative  INR 4.4, ammonia 57, white blood cells 13.4, hemoglobin 7.1, hematocrit 21.9, platelets 72, , , total bilirubin 12.1, sodium 131, potassium 4.1, chloride 100, CO2 28, BUN 58, creatinine 1.38, glucose 135     August 3:  White blood cells 20, hemoglobin 6.1, hematocrit 18.7, platelets 76, INR 3.2, sodium 130, potassium 4.5, chloride 98, CO2 19, BUN 52, creatinine 1.44, glucose 120, total bilirubin 10.4, AST 1275,     During my  interview upon arrival to [facility] patient is awake, conversant and not in distress. He is afebrile and oxygenating well on room air. Patient denies any further bleeding episodes since EGD at outside hospital. Patient states he has been drinking up to fifth of vodka daily until 6 weeks ago and since he has been drinking mountain dew instead of alcohol. He lives at home with his mother and sister. He reports working as a . Patient smokes cigarettes and marijuana occasionally but denies h/o IVDU or other illicit drug use.       * No surgery found *      Hospital Course:   Patient not a current transplant candidate. He completed abx for 5 days for his history of variceal bleed. Octreotide gtt complete as well. To be discharged home with lactulose for HE ppx, propranolol for EV history, and PPI. Continuing MVT and thiamine given etoh use history. Patient endorsed that he would seek etoh rehab assistance closer to home once he figures out the caregiver situation for his mother as he is her primary caregiver at this current time.        Goals of Care Treatment Preferences:  Code Status: Full Code      Consults:   Consults (From admission, onward)        Status Ordering Provider     Inpatient consult to Psychiatry  Once        Provider:  (Not yet assigned)    Completed BRANDON MCNAIR     Inpatient consult to Hepatology  Once        Provider:  (Not yet assigned)    Completed GENET SOUZA          * Bleeding esophageal varices  Presented to outside hospital with acute hematemesis due to bleeding EV. Hgb low 2.6 at outside hospital and received total 5 units packed PRBC transfusion   - s/p EGD at outside hospital with banding of EV   - Hgb has remained stable, no additional episodes of hematemesis  - Completed octreotide for 72 hrs.  - Completed ciprofloxacin for 5 days total therapy for hx of variceal bleed  - Continuing propranolol 10mg BID         Hepatic encephalopathy  Reportedly had confusion at outside hospital    - Continue with lactulose   - Currently mentation is AOx4, unclear if HE at OSH was first episode or not.      Coagulopathy  INR elevated to 2.9, due to liver cirrhosis   - s/p vitamin K daily for 3 doses.   - INR down to 1.9      Decompensated hepatic cirrhosis  MELD-Na score: 28 at 8/8/2022  5:22 AM  MELD score: 25 at 8/8/2022  5:22 AM  Calculated from:  Serum Creatinine: 0.9 mg/dL (Using min of 1 mg/dL) at 8/8/2022  5:22 AM  Serum Sodium: 130 mmol/L at 8/8/2022  5:22 AM  Total Bilirubin: 18.4 mg/dL at 8/8/2022  5:22 AM  INR(ratio): 1.9 at 8/8/2022  5:22 AM  Age: 36 years  Decompensated etoh cirrhosis. Long history of drinking and quit drinking ~ 6 weeks ago per patient. Longest period of sobriety was 3-4 months but that was 5-6 years ago. He uses etoh as a coping mechanism for his depressive episodes tied to his bipolar diagnosis.  - Hepatology following, not a current transplant candidate given etoh use history and failed rehab attempts. Patient may be reconsidered later if he maintains sobriety and completes rehab  - Hepatology to arrange outpatient f/u      Acute posthemorrhagic anemia  - see above under bleeding esophageal varices for management   - Patient also iron deficient, started ferrous sulfate        Final Active Diagnoses:    Diagnosis Date Noted POA    PRINCIPAL PROBLEM:  Bleeding esophageal varices [I85.01] 08/05/2022 Yes    Iron deficiency [E61.1] 08/07/2022 Yes    Acute posthemorrhagic anemia [D62] 08/05/2022 Yes    Decompensated hepatic cirrhosis [K72.90, K74.60] 08/05/2022 Yes    Coagulopathy [D68.9] 08/05/2022 Yes    Thrombocytopenia [D69.6] 08/05/2022 Yes    Hepatic encephalopathy [K72.90] 08/05/2022 Yes      Problems Resolved During this Admission:       Discharged Condition: stable    Disposition: Home or Self Care    Follow Up:   Follow-up Information     [facility] Follow up on 8/11/2022.    Why: Established pt's hospital f/u visit @ 1:00pm. Please bring discharge  summary, ID, insurance card, and medication list.  Contact information:  [address], [address]  [place]  [phone]                     Patient Instructions:   No discharge procedures on file.    Significant Diagnostic Studies: Labs:   CMP   Recent Labs   Lab 08/07/22 0543 08/08/22 0522   * 130*   K 3.5 3.5    102   CO2 24 22*   GLU 92 86   BUN 21* 17   CREATININE 0.9 0.9   CALCIUM 7.1* 7.1*   PROT 4.2* 4.2*   ALBUMIN 2.0* 1.9*   BILITOT 18.0* 18.4*   ALKPHOS 89 100   * 197*   * 260*   ANIONGAP 6* 6*   , CBC   Recent Labs   Lab 08/07/22 0543 08/08/22 0522   WBC 6.36 9.23   HGB 7.8* 7.9*   HCT 24.4* 25.1*   PLT 72* 81*    and INR   Lab Results   Component Value Date    INR 1.9 (H) 08/08/2022    INR 2.0 (H) 08/07/2022    INR 2.5 (H) 08/06/2022     Radiology: Ultrasound: 8/5/22  Impression:     Splenomegaly, hepatomegaly, splenic hilar collaterals, recanalized periumbilical vein, and reversal of flow in the main and right portal veins is consistent with portal hypertension from hepatocellular disease.     Sludge in the gallbladder and pericholecystic fluid.     Pleural effusion.     Small amount of ascites.     Cirrhotic appearing liver.    Pending Diagnostic Studies:     Procedure Component Value Units Date/Time    CHRISTEL [[id number]] Collected: 08/06/22 0314    Order Status: Sent Lab Status: In process Updated: 08/06/22 0334    Specimen: Blood     Alpha 1 Antitrypsin Defiiciency Profile [[id number]] Collected: 08/06/22 0314    Order Status: Sent Lab Status: In process Updated: 08/06/22 0334    Specimen: Blood     Anti-smooth muscle antibody [[id number]] Collected: 08/06/22 0314    Order Status: Sent Lab Status: In process Updated: 08/06/22 0334    Specimen: Blood     Antimitochondrial antibody [[id number]] Collected: 08/06/22 0314    Order Status: Sent Lab Status: In process Updated: 08/06/22 0334    Specimen: Blood     Phosphatidylethanol (PETH) [[id number]] Collected: 08/05/22 0527     Order Status: Sent Lab Status: In process Updated: 08/05/22 0538    Specimen: Blood          Medications:  Reconciled Home Medications:      Medication List      START taking these medications    CONSTULOSE 10 gram/15 mL solution  Generic drug: lactulose  Take 15 mLs (10 g total) by mouth 3 (three) times daily. TITRATE TO 3-4 BOWEL MOVEMENTS DAILY.     ferrous sulfate 325 (65 FE) MG EC tablet  Take 1 tablet (325 mg total) by mouth once daily.     folic acid 1 MG tablet  Commonly known as: FOLVITE  Take 1 tablet (1 mg total) by mouth once daily.     propranoloL 10 MG tablet  Commonly known as: INDERAL  Take 1 tablet (10 mg total) by mouth 2 (two) times daily.     thiamine 100 MG tablet  Take 1 tablet (100 mg total) by mouth once daily.        STOP taking these medications    ibuprofen 400 MG tablet  Commonly known as: ADVIL,MOTRIN            Indwelling Lines/Drains at time of discharge:   Lines/Drains/Airways     None                 Time spent on the discharge of patient: 35 minutes         Taryn Linares MD  Department of Hospital Medicine  [facility] - Telemetry Stepdown

## 2022-08-08 NOTE — PLAN OF CARE
08/08/22 1201   Post-Acute Status   Post-Acute Authorization Other   Other Status See Comments  (Ambulatory van)   Patient reports he has no means of transportation to return home. SW arranged ambulatory van transport via [facility]. Requested  time is 1:00 PM. Requested  time does not guarantee arrival time.      Xuan Landry LMSW  [facility]  Ext. [phone]

## 2022-08-08 NOTE — ASSESSMENT & PLAN NOTE
MELD-Na score: 28 at 8/8/2022  5:22 AM  MELD score: 25 at 8/8/2022  5:22 AM  Calculated from:  Serum Creatinine: 0.9 mg/dL (Using min of 1 mg/dL) at 8/8/2022  5:22 AM  Serum Sodium: 130 mmol/L at 8/8/2022  5:22 AM  Total Bilirubin: 18.4 mg/dL at 8/8/2022  5:22 AM  INR(ratio): 1.9 at 8/8/2022  5:22 AM  Age: 36 years  Decompensated etoh cirrhosis. Long history of drinking and quit drinking ~ 6 weeks ago per patient. Longest period of sobriety was 3-4 months but that was 5-6 years ago. He uses etoh as a coping mechanism for his depressive episodes tied to his bipolar diagnosis.  - Hepatology following, not a current transplant candidate given etoh use history and failed rehab attempts. Patient may be reconsidered later if he maintains sobriety and completes rehab  - Hepatology to arrange outpatient f/u

## 2022-08-08 NOTE — PLAN OF CARE
[facility] - Telemetry Stepdown  Discharge Final Note    Primary Care Provider: Primary Doctor No    Expected Discharge Date: 8/8/2022    Final Discharge Note (most recent)     Final Note - 08/08/22 1245        Final Note    Assessment Type Final Discharge Note     Anticipated Discharge Disposition Home or Self Care     What phone number can be called within the next 1-3 days to see how you are doing after discharge? [phone]     Hospital Resources/Appts/Education Provided Appointments scheduled and added to AVS        Post-Acute Status    Post-Acute Authorization Other     Other Status No Post-Acute Service Needs     Discharge Delays None known at this time                 Important Message from Medicare             Contact [facility]    [address]  [place]  [phone]       Next Steps: Follow up on 8/11/2022    Instructions: Established pt's hospital f/u visit @ 1:00pm. Please bring discharge summary, ID, insurance card, and medication list.          Sneha Carrizales RN  Ext [phone]

## 2022-08-08 NOTE — ASSESSMENT & PLAN NOTE
Presented to outside hospital with acute hematemesis due to bleeding EV. Hgb low 2.6 at outside hospital and received total 5 units packed PRBC transfusion   - s/p EGD at outside hospital with banding of EV   - Hgb has remained stable, no additional episodes of hematemesis  - Completed octreotide for 72 hrs.  - Completed ciprofloxacin for 5 days total therapy for hx of variceal bleed  - Continuing propranolol 10mg BID

## 2022-08-08 NOTE — ASSESSMENT & PLAN NOTE
INR elevated to 2.9, due to liver cirrhosis   - s/p vitamin K daily for 3 doses.   - INR down to 1.9

## 2022-08-08 NOTE — NURSING
Patient is AAOX4, VSS, NAD. Discharged instructions reviewed and explained. Patient verbalized understanding. PIVs removed. Patient is waiting for pickup.

## 2022-08-09 ENCOUNTER — PATIENT OUTREACH (OUTPATIENT)
Dept: ADMINISTRATIVE | Facility: CLINIC | Age: 37
End: 2022-08-09
Payer: MEDICAID

## 2022-08-09 NOTE — PROGRESS NOTES
C3 nurse spoke with Porfirio Wilson for a TCC post hospital discharge follow up call. The patient has a scheduled [facility] appointment with [facility] on 08/11/2022 @ 1300.

## 2022-08-10 LAB
BACTERIA BLD CULT: NORMAL
BACTERIA BLD CULT: NORMAL
MITOCHONDRIA AB TITR SER IF: NORMAL {TITER}
SMOOTH MUSCLE AB TITR SER IF: NORMAL {TITER}

## 2022-08-12 LAB
A1AT PROTEOTYPE S/Z, LC-MS/MS: NORMAL
A1AT SERPL NEPH-MCNC: 113 MG/DL (ref 100–190)

## 2022-09-03 ENCOUNTER — HOSPITAL ENCOUNTER (INPATIENT)
Facility: HOSPITAL | Age: 37
LOS: 15 days | Discharge: HOME | DRG: 871 | End: 2022-09-18
Attending: INTERNAL MEDICINE | Admitting: INTERNAL MEDICINE
Payer: MEDICAID

## 2022-09-03 DIAGNOSIS — K74.60 DECOMPENSATED HEPATIC CIRRHOSIS: ICD-10-CM

## 2022-09-03 DIAGNOSIS — J96.01 ACUTE HYPOXEMIC RESPIRATORY FAILURE: ICD-10-CM

## 2022-09-03 DIAGNOSIS — I85.11 SECONDARY ESOPHAGEAL VARICES WITH BLEEDING: Primary | ICD-10-CM

## 2022-09-03 DIAGNOSIS — J39.2 OROPHARYNGEAL BLEEDING: ICD-10-CM

## 2022-09-03 DIAGNOSIS — K72.90 DECOMPENSATED HEPATIC CIRRHOSIS: ICD-10-CM

## 2022-09-03 DIAGNOSIS — R00.1 BRADYCARDIA: ICD-10-CM

## 2022-09-03 DIAGNOSIS — R33.9 URINARY RETENTION: ICD-10-CM

## 2022-09-03 DIAGNOSIS — R94.31 PROLONGED Q-T INTERVAL ON ECG: ICD-10-CM

## 2022-09-03 DIAGNOSIS — F10.20 ALCOHOL USE DISORDER, SEVERE, DEPENDENCE: Chronic | ICD-10-CM

## 2022-09-03 PROBLEM — R56.9 SEIZURE: Status: ACTIVE | Noted: 2022-09-03

## 2022-09-03 PROCEDURE — 99292 PR CRITICAL CARE, ADDL 30 MIN: ICD-10-PCS | Mod: FS,,, | Performed by: INTERNAL MEDICINE

## 2022-09-03 PROCEDURE — 86900 BLOOD TYPING SEROLOGIC ABO: CPT | Performed by: NURSE PRACTITIONER

## 2022-09-03 PROCEDURE — 99291 PR CRITICAL CARE, E/M 30-74 MINUTES: ICD-10-PCS | Mod: FS,,, | Performed by: INTERNAL MEDICINE

## 2022-09-03 PROCEDURE — 82728 ASSAY OF FERRITIN: CPT | Performed by: NURSE PRACTITIONER

## 2022-09-03 PROCEDURE — 83605 ASSAY OF LACTIC ACID: CPT | Performed by: NURSE PRACTITIONER

## 2022-09-03 PROCEDURE — 27000221 HC OXYGEN, UP TO 24 HOURS

## 2022-09-03 PROCEDURE — 99292 CRITICAL CARE ADDL 30 MIN: CPT | Mod: FS,,, | Performed by: INTERNAL MEDICINE

## 2022-09-03 PROCEDURE — 86901 BLOOD TYPING SEROLOGIC RH(D): CPT | Performed by: NURSE PRACTITIONER

## 2022-09-03 PROCEDURE — 84484 ASSAY OF TROPONIN QUANT: CPT | Performed by: NURSE PRACTITIONER

## 2022-09-03 PROCEDURE — 85610 PROTHROMBIN TIME: CPT | Performed by: NURSE PRACTITIONER

## 2022-09-03 PROCEDURE — 87116 MYCOBACTERIA CULTURE: CPT | Performed by: NURSE PRACTITIONER

## 2022-09-03 PROCEDURE — 85652 RBC SED RATE AUTOMATED: CPT | Performed by: NURSE PRACTITIONER

## 2022-09-03 PROCEDURE — 99900035 HC TECH TIME PER 15 MIN (STAT)

## 2022-09-03 PROCEDURE — 87070 CULTURE OTHR SPECIMN AEROBIC: CPT | Performed by: NURSE PRACTITIONER

## 2022-09-03 PROCEDURE — 85379 FIBRIN DEGRADATION QUANT: CPT | Performed by: NURSE PRACTITIONER

## 2022-09-03 PROCEDURE — 94002 VENT MGMT INPAT INIT DAY: CPT

## 2022-09-03 PROCEDURE — 84145 PROCALCITONIN (PCT): CPT | Performed by: NURSE PRACTITIONER

## 2022-09-03 PROCEDURE — 87075 CULTR BACTERIA EXCEPT BLOOD: CPT | Performed by: NURSE PRACTITIONER

## 2022-09-03 PROCEDURE — 63600175 PHARM REV CODE 636 W HCPCS

## 2022-09-03 PROCEDURE — 80053 COMPREHEN METABOLIC PANEL: CPT | Performed by: NURSE PRACTITIONER

## 2022-09-03 PROCEDURE — 27200966 HC CLOSED SUCTION SYSTEM

## 2022-09-03 PROCEDURE — 84100 ASSAY OF PHOSPHORUS: CPT | Performed by: NURSE PRACTITIONER

## 2022-09-03 PROCEDURE — 27000207 HC ISOLATION

## 2022-09-03 PROCEDURE — 85730 THROMBOPLASTIN TIME PARTIAL: CPT | Performed by: NURSE PRACTITIONER

## 2022-09-03 PROCEDURE — 83615 LACTATE (LD) (LDH) ENZYME: CPT | Performed by: NURSE PRACTITIONER

## 2022-09-03 PROCEDURE — 83735 ASSAY OF MAGNESIUM: CPT | Performed by: NURSE PRACTITIONER

## 2022-09-03 PROCEDURE — 86850 RBC ANTIBODY SCREEN: CPT | Performed by: NURSE PRACTITIONER

## 2022-09-03 PROCEDURE — 87205 SMEAR GRAM STAIN: CPT | Performed by: NURSE PRACTITIONER

## 2022-09-03 PROCEDURE — 80321 ALCOHOLS BIOMARKERS 1OR 2: CPT | Performed by: NURSE PRACTITIONER

## 2022-09-03 PROCEDURE — 20000000 HC ICU ROOM

## 2022-09-03 PROCEDURE — 86140 C-REACTIVE PROTEIN: CPT | Performed by: NURSE PRACTITIONER

## 2022-09-03 PROCEDURE — 99900026 HC AIRWAY MAINTENANCE (STAT)

## 2022-09-03 PROCEDURE — 85025 COMPLETE CBC W/AUTO DIFF WBC: CPT | Performed by: NURSE PRACTITIONER

## 2022-09-03 PROCEDURE — 94761 N-INVAS EAR/PLS OXIMETRY MLT: CPT

## 2022-09-03 PROCEDURE — 87040 BLOOD CULTURE FOR BACTERIA: CPT | Performed by: NURSE PRACTITIONER

## 2022-09-03 PROCEDURE — 87206 SMEAR FLUORESCENT/ACID STAI: CPT | Performed by: NURSE PRACTITIONER

## 2022-09-03 PROCEDURE — 99291 CRITICAL CARE FIRST HOUR: CPT | Mod: FS,,, | Performed by: INTERNAL MEDICINE

## 2022-09-03 RX ORDER — FOLIC ACID 1 MG/1
1 TABLET ORAL DAILY
Status: DISCONTINUED | OUTPATIENT
Start: 2022-09-04 | End: 2022-09-07

## 2022-09-03 RX ORDER — THIAMINE HCL 100 MG
100 TABLET ORAL DAILY
Status: DISCONTINUED | OUTPATIENT
Start: 2022-09-04 | End: 2022-09-07

## 2022-09-03 RX ORDER — LACTULOSE 10 G/15ML
20 SOLUTION ORAL 3 TIMES DAILY
Status: DISCONTINUED | OUTPATIENT
Start: 2022-09-04 | End: 2022-09-07

## 2022-09-03 RX ORDER — NOREPINEPHRINE BITARTRATE/D5W 4MG/250ML
0-3 PLASTIC BAG, INJECTION (ML) INTRAVENOUS CONTINUOUS
Status: DISCONTINUED | OUTPATIENT
Start: 2022-09-03 | End: 2022-09-04

## 2022-09-03 RX ORDER — PANTOPRAZOLE SODIUM 40 MG/1
40 FOR SUSPENSION ORAL DAILY
Status: DISCONTINUED | OUTPATIENT
Start: 2022-09-04 | End: 2022-09-06

## 2022-09-03 RX ORDER — CHLORHEXIDINE GLUCONATE ORAL RINSE 1.2 MG/ML
15 SOLUTION DENTAL 2 TIMES DAILY
Status: DISCONTINUED | OUTPATIENT
Start: 2022-09-03 | End: 2022-09-06

## 2022-09-03 RX ORDER — ONDANSETRON 2 MG/ML
4 INJECTION INTRAMUSCULAR; INTRAVENOUS EVERY 8 HOURS PRN
Status: DISCONTINUED | OUTPATIENT
Start: 2022-09-03 | End: 2022-09-08

## 2022-09-03 RX ORDER — PROPOFOL 10 MG/ML
0-50 INJECTION, EMULSION INTRAVENOUS CONTINUOUS
Status: DISCONTINUED | OUTPATIENT
Start: 2022-09-03 | End: 2022-09-08

## 2022-09-03 RX ORDER — LEVOFLOXACIN 5 MG/ML
750 INJECTION, SOLUTION INTRAVENOUS
Status: DISCONTINUED | OUTPATIENT
Start: 2022-09-04 | End: 2022-09-05

## 2022-09-03 RX ORDER — SODIUM CHLORIDE 0.9 % (FLUSH) 0.9 %
10 SYRINGE (ML) INJECTION
Status: DISCONTINUED | OUTPATIENT
Start: 2022-09-03 | End: 2022-09-18 | Stop reason: HOSPADM

## 2022-09-03 RX ORDER — DEXAMETHASONE SODIUM PHOSPHATE 4 MG/ML
6 INJECTION, SOLUTION INTRA-ARTICULAR; INTRALESIONAL; INTRAMUSCULAR; INTRAVENOUS; SOFT TISSUE DAILY
Status: COMPLETED | OUTPATIENT
Start: 2022-09-03 | End: 2022-09-11

## 2022-09-03 RX ORDER — DEXMEDETOMIDINE HYDROCHLORIDE 4 UG/ML
0-1.4 INJECTION, SOLUTION INTRAVENOUS CONTINUOUS
Status: DISCONTINUED | OUTPATIENT
Start: 2022-09-03 | End: 2022-09-06

## 2022-09-03 RX ORDER — PROPOFOL 10 MG/ML
INJECTION, EMULSION INTRAVENOUS
Status: COMPLETED
Start: 2022-09-03 | End: 2022-09-03

## 2022-09-03 RX ORDER — NOREPINEPHRINE BITARTRATE 1 MG/ML
INJECTION, SOLUTION INTRAVENOUS
Status: COMPLETED
Start: 2022-09-03 | End: 2022-09-04

## 2022-09-03 RX ORDER — ACETAMINOPHEN 325 MG/1
650 TABLET ORAL EVERY 4 HOURS PRN
Status: DISCONTINUED | OUTPATIENT
Start: 2022-09-03 | End: 2022-09-07

## 2022-09-03 RX ADMIN — PROPOFOL 15 MCG/KG/MIN: 10 INJECTION, EMULSION INTRAVENOUS at 11:09

## 2022-09-03 NOTE — PROVIDER TRANSFER
Outside Transfer Acceptance Note / Regional Referral Center    Referring facility: [facility]  Referring provider: SHADI STALEY  Accepting facility: [facility]  Accepting provider: HIMA BRADFORD  Admitting provider: ADEN ARMSTRONG  Reason for transfer: COVID-19, higher level of care, Hepatology  Transfer diagnosis: acute respiratory failure, liver failure, covid +  Transfer specialty requested: Hepatology  Transfer specialty notified: yes  Transfer level: NUMBER 1-5: 2  Bed type requested: MICU  Isolation status:  Airborne and droplet and contact/COVID isolation  Admission class or status: IP- Inpatient      Narrative     36-year-old male with a history of bipolar disorder, alcohol use, cirrhosis, pancreatitis, variceal bleeding (with band ligation), coagulopathy, thrombocytopenia, and blood loss anemia admitted to [facility] on September 3 with respiratory distress.  COVID positive. He required intubation due to respiratory distress. Left-sided chest tube was placed.  Gastric tube, ET tube, central line, and chest tube are in place. In the emergency department she received Levaquin, Flagyl, Protonix, vitamin K, and IV fluid.  She was placed on propofol and Levophed infusions.  With the concern for combination of decompensated cirrhosis, COVID, and significant pleural effusion requiring chest tube placement, they are requesting transfer Critical Care Medicine at [facility] for higher level of care and Hepatology evaluation.  Referring provider felt patient was stable for ground transfer.  Will transferring COVID isolation status.    September 3: COVID positive  Procalcitonin 0.459, white blood cells 15.9, hemoglobin 10.1, hematocrit 32, platelets 100, lactic acid 5 (repeat 2.7), , sodium 130, potassium 3.6, chloride 98, CO2 18, BUN 18, creatinine 1, glucose 96, total bilirubin 8.6, AST 88, ALT 38, INR 2.1, troponin  negative, ammonia 86, NT-proBNP 295.5, blood alcohol- none detected  pH 7.37, pCO2 37, pO2 436 (FiO2 subsequently weaned)    Initial EKG showed complete right lung opacification likely representing a large pleural effusion with right to left mediastinal shift.  Left-sided pulmonary infiltrates.    First repeat chest x-ray showed complete opacification of the right hemithorax.  ET tube and NG tube in good position.  Left pulmonary infiltrates.    2nd repeat chest x-ray showed right-sided chest tube placed with right lung reinflated with trace right pleural effusion.  No evidence of pneumothorax.  Right subclavian catheter with tip in good position.  ET tube and NG tube are noted with tips in good position.  Infiltrates noted in the left lung field.    Objective     Vitals:  Temperature 97.3, pulse 70, blood pressure 96/57, respirations 16, O2 sats 100%  Vent settings:   AC, rate of 16, tidal volume 510, PEEP 5, FiO2 50%    Infusions: Propofol, levophed  Allergies:   Review of patient's allergies indicates:   Allergen Reactions    Ampicillin     Ceclor [cefaclor]     Penicillins       Instructions    Admit to Critical Care Medicine  COVID isolation, COVID protocols    Upon patient arrival, please contact Critical Care Medicine on call.     JALIL Balbuena MD  Hospital Medicine Staff  Cell: [phone]

## 2022-09-04 PROBLEM — J39.2 OROPHARYNGEAL BLEEDING: Status: ACTIVE | Noted: 2022-09-04

## 2022-09-04 LAB
ABO GROUP BLD: NORMAL
ALBUMIN SERPL BCP-MCNC: 1.8 G/DL (ref 3.5–5.2)
ALBUMIN SERPL BCP-MCNC: 1.8 G/DL (ref 3.5–5.2)
ALLENS TEST: ABNORMAL
ALP SERPL-CCNC: 104 U/L (ref 55–135)
ALP SERPL-CCNC: 111 U/L (ref 55–135)
ALT SERPL W/O P-5'-P-CCNC: 34 U/L (ref 10–44)
ALT SERPL W/O P-5'-P-CCNC: 37 U/L (ref 10–44)
ANION GAP SERPL CALC-SCNC: 7 MMOL/L (ref 8–16)
ANION GAP SERPL CALC-SCNC: 8 MMOL/L (ref 8–16)
APPEARANCE FLD: NORMAL
APTT BLDCRRT: 41 SEC (ref 21–32)
AST SERPL-CCNC: 100 U/L (ref 10–40)
AST SERPL-CCNC: 79 U/L (ref 10–40)
BASOPHILS # BLD AUTO: 0.01 K/UL (ref 0–0.2)
BASOPHILS # BLD AUTO: 0.03 K/UL (ref 0–0.2)
BASOPHILS NFR BLD: 0.1 % (ref 0–1.9)
BASOPHILS NFR BLD: 0.4 % (ref 0–1.9)
BILIRUB SERPL-MCNC: 7.8 MG/DL (ref 0.1–1)
BILIRUB SERPL-MCNC: 8.2 MG/DL (ref 0.1–1)
BILIRUB UR QL STRIP: ABNORMAL
BLD GP AB SCN CELLS X3 SERPL QL: NORMAL
BODY FLD TYPE: NORMAL
BUN SERPL-MCNC: 13 MG/DL (ref 6–20)
BUN SERPL-MCNC: 14 MG/DL (ref 6–20)
CALCIUM SERPL-MCNC: 7.9 MG/DL (ref 8.7–10.5)
CALCIUM SERPL-MCNC: 8 MG/DL (ref 8.7–10.5)
CHLORIDE SERPL-SCNC: 105 MMOL/L (ref 95–110)
CHLORIDE SERPL-SCNC: 106 MMOL/L (ref 95–110)
CLARITY UR REFRACT.AUTO: CLEAR
CO2 SERPL-SCNC: 21 MMOL/L (ref 23–29)
CO2 SERPL-SCNC: 21 MMOL/L (ref 23–29)
COLOR FLD: YELLOW
COLOR UR AUTO: YELLOW
CREAT SERPL-MCNC: 0.8 MG/DL (ref 0.5–1.4)
CREAT SERPL-MCNC: 0.8 MG/DL (ref 0.5–1.4)
CRP SERPL-MCNC: 70.6 MG/L (ref 0–8.2)
D DIMER PPP IA.FEU-MCNC: 5.76 MG/L FEU
DELSYS: ABNORMAL
DIFFERENTIAL METHOD: ABNORMAL
DIFFERENTIAL METHOD: ABNORMAL
EOSINOPHIL # BLD AUTO: 0 K/UL (ref 0–0.5)
EOSINOPHIL # BLD AUTO: 0.1 K/UL (ref 0–0.5)
EOSINOPHIL NFR BLD: 0.3 % (ref 0–8)
EOSINOPHIL NFR BLD: 1.2 % (ref 0–8)
ERYTHROCYTE [DISTWIDTH] IN BLOOD BY AUTOMATED COUNT: 20 % (ref 11.5–14.5)
ERYTHROCYTE [DISTWIDTH] IN BLOOD BY AUTOMATED COUNT: 20.5 % (ref 11.5–14.5)
ERYTHROCYTE [SEDIMENTATION RATE] IN BLOOD BY PHOTOMETRIC METHOD: 15 MM/HR (ref 0–23)
ERYTHROCYTE [SEDIMENTATION RATE] IN BLOOD BY WESTERGREN METHOD: 20 MM/H
EST. GFR  (NO RACE VARIABLE): >60 ML/MIN/1.73 M^2
EST. GFR  (NO RACE VARIABLE): >60 ML/MIN/1.73 M^2
FERRITIN SERPL-MCNC: 142 NG/ML (ref 20–300)
FIO2: 40
GLUCOSE SERPL-MCNC: 141 MG/DL (ref 70–110)
GLUCOSE SERPL-MCNC: 99 MG/DL (ref 70–110)
GLUCOSE UR QL STRIP: NEGATIVE
GRAM STN SPEC: NORMAL
GRAM STN SPEC: NORMAL
HCO3 UR-SCNC: 22 MMOL/L (ref 24–28)
HCT VFR BLD AUTO: 28.7 % (ref 40–54)
HCT VFR BLD AUTO: 31.5 % (ref 40–54)
HGB BLD-MCNC: 10 G/DL (ref 14–18)
HGB BLD-MCNC: 9.5 G/DL (ref 14–18)
HGB UR QL STRIP: ABNORMAL
HYALINE CASTS UR QL AUTO: 3 /LPF
IMM GRANULOCYTES # BLD AUTO: 0.04 K/UL (ref 0–0.04)
IMM GRANULOCYTES # BLD AUTO: 0.05 K/UL (ref 0–0.04)
IMM GRANULOCYTES NFR BLD AUTO: 0.5 % (ref 0–0.5)
IMM GRANULOCYTES NFR BLD AUTO: 0.5 % (ref 0–0.5)
INR PPP: 1.8 (ref 0.8–1.2)
INR PPP: 1.8 (ref 0.8–1.2)
KETONES UR QL STRIP: NEGATIVE
LACTATE SERPL-SCNC: 2.2 MMOL/L (ref 0.5–2.2)
LACTATE SERPL-SCNC: 2.7 MMOL/L (ref 0.5–2.2)
LDH SERPL L TO P-CCNC: 412 U/L (ref 110–260)
LEUKOCYTE ESTERASE UR QL STRIP: NEGATIVE
LYMPHOCYTES # BLD AUTO: 0.4 K/UL (ref 1–4.8)
LYMPHOCYTES # BLD AUTO: 1 K/UL (ref 1–4.8)
LYMPHOCYTES NFR BLD: 11.7 % (ref 18–48)
LYMPHOCYTES NFR BLD: 4.1 % (ref 18–48)
LYMPHOCYTES NFR FLD MANUAL: 25 %
MAGNESIUM SERPL-MCNC: 1.5 MG/DL (ref 1.6–2.6)
MAGNESIUM SERPL-MCNC: 1.8 MG/DL (ref 1.6–2.6)
MCH RBC QN AUTO: 29.5 PG (ref 27–31)
MCH RBC QN AUTO: 31.7 PG (ref 27–31)
MCHC RBC AUTO-ENTMCNC: 31.7 G/DL (ref 32–36)
MCHC RBC AUTO-ENTMCNC: 33.1 G/DL (ref 32–36)
MCV RBC AUTO: 93 FL (ref 82–98)
MCV RBC AUTO: 96 FL (ref 82–98)
MESOTHL CELL NFR FLD MANUAL: 7 %
MICROSCOPIC COMMENT: ABNORMAL
MIN VOL: 9.6
MODE: ABNORMAL
MONOCYTES # BLD AUTO: 0.2 K/UL (ref 0.3–1)
MONOCYTES # BLD AUTO: 0.7 K/UL (ref 0.3–1)
MONOCYTES NFR BLD: 2 % (ref 4–15)
MONOCYTES NFR BLD: 7.8 % (ref 4–15)
MONOS+MACROS NFR FLD MANUAL: 15 %
NEUTROPHILS # BLD AUTO: 6.5 K/UL (ref 1.8–7.7)
NEUTROPHILS # BLD AUTO: 9.4 K/UL (ref 1.8–7.7)
NEUTROPHILS NFR BLD: 78.4 % (ref 38–73)
NEUTROPHILS NFR BLD: 93 % (ref 38–73)
NEUTROPHILS NFR FLD MANUAL: 53 %
NITRITE UR QL STRIP: NEGATIVE
NRBC BLD-RTO: 0 /100 WBC
NRBC BLD-RTO: 0 /100 WBC
PCO2 BLDA: 33.6 MMHG (ref 35–45)
PEEP: 5
PH SMN: 7.42 [PH] (ref 7.35–7.45)
PH UR STRIP: 7 [PH] (ref 5–8)
PHOSPHATE SERPL-MCNC: 2.5 MG/DL (ref 2.7–4.5)
PHOSPHATE SERPL-MCNC: 3.3 MG/DL (ref 2.7–4.5)
PIP: 21
PLATELET # BLD AUTO: 65 K/UL (ref 150–450)
PLATELET # BLD AUTO: 75 K/UL (ref 150–450)
PMV BLD AUTO: ABNORMAL FL (ref 9.2–12.9)
PMV BLD AUTO: ABNORMAL FL (ref 9.2–12.9)
PO2 BLDA: 93 MMHG (ref 80–100)
POC BE: -2 MMOL/L
POC PCO2 TEMP: 30.2 MMHG
POC PH TEMP: 7.46
POC PO2 TEMP: 80 MMHG
POC SATURATED O2: 97 % (ref 95–100)
POC TCO2: 23 MMOL/L (ref 23–27)
POC TEMPERATURE: ABNORMAL
POTASSIUM SERPL-SCNC: 3.4 MMOL/L (ref 3.5–5.1)
POTASSIUM SERPL-SCNC: 3.5 MMOL/L (ref 3.5–5.1)
PROCALCITONIN SERPL IA-MCNC: 0.94 NG/ML
PROT SERPL-MCNC: 6.1 G/DL (ref 6–8.4)
PROT SERPL-MCNC: 6.3 G/DL (ref 6–8.4)
PROT UR QL STRIP: ABNORMAL
PROTHROMBIN TIME: 18 SEC (ref 9–12.5)
PROTHROMBIN TIME: 18.2 SEC (ref 9–12.5)
RBC # BLD AUTO: 3 M/UL (ref 4.6–6.2)
RBC # BLD AUTO: 3.39 M/UL (ref 4.6–6.2)
RBC #/AREA URNS AUTO: >100 /HPF (ref 0–4)
RH BLD: NORMAL
SAMPLE: ABNORMAL
SARS-COV-2 RDRP RESP QL NAA+PROBE: POSITIVE
SITE: ABNORMAL
SODIUM SERPL-SCNC: 134 MMOL/L (ref 136–145)
SODIUM SERPL-SCNC: 134 MMOL/L (ref 136–145)
SP GR UR STRIP: >1.03 (ref 1–1.03)
SP02: 100
TROPONIN I SERPL DL<=0.01 NG/ML-MCNC: 0.04 NG/ML (ref 0–0.03)
URN SPEC COLLECT METH UR: ABNORMAL
VT: 470
WBC # BLD AUTO: 10.06 K/UL (ref 3.9–12.7)
WBC # BLD AUTO: 8.32 K/UL (ref 3.9–12.7)
WBC # FLD: 232 /CU MM
WBC #/AREA URNS AUTO: 15 /HPF (ref 0–5)

## 2022-09-04 PROCEDURE — 25000003 PHARM REV CODE 250

## 2022-09-04 PROCEDURE — 25000003 PHARM REV CODE 250: Performed by: INTERNAL MEDICINE

## 2022-09-04 PROCEDURE — 63600175 PHARM REV CODE 636 W HCPCS: Performed by: INTERNAL MEDICINE

## 2022-09-04 PROCEDURE — 84157 ASSAY OF PROTEIN OTHER: CPT | Performed by: NURSE PRACTITIONER

## 2022-09-04 PROCEDURE — 27200966 HC CLOSED SUCTION SYSTEM

## 2022-09-04 PROCEDURE — 83605 ASSAY OF LACTIC ACID: CPT | Performed by: STUDENT IN AN ORGANIZED HEALTH CARE EDUCATION/TRAINING PROGRAM

## 2022-09-04 PROCEDURE — 99900035 HC TECH TIME PER 15 MIN (STAT)

## 2022-09-04 PROCEDURE — 99291 PR CRITICAL CARE, E/M 30-74 MINUTES: ICD-10-PCS | Mod: FS,,, | Performed by: INTERNAL MEDICINE

## 2022-09-04 PROCEDURE — 99292 PR CRITICAL CARE, ADDL 30 MIN: ICD-10-PCS | Mod: FS,,, | Performed by: INTERNAL MEDICINE

## 2022-09-04 PROCEDURE — 99292 CRITICAL CARE ADDL 30 MIN: CPT | Mod: FS,,, | Performed by: INTERNAL MEDICINE

## 2022-09-04 PROCEDURE — 99291 CRITICAL CARE FIRST HOUR: CPT | Mod: FS,,, | Performed by: INTERNAL MEDICINE

## 2022-09-04 PROCEDURE — 83735 ASSAY OF MAGNESIUM: CPT | Performed by: NURSE PRACTITIONER

## 2022-09-04 PROCEDURE — 81001 URINALYSIS AUTO W/SCOPE: CPT | Performed by: NURSE PRACTITIONER

## 2022-09-04 PROCEDURE — 25000003 PHARM REV CODE 250: Performed by: NURSE PRACTITIONER

## 2022-09-04 PROCEDURE — 82803 BLOOD GASES ANY COMBINATION: CPT

## 2022-09-04 PROCEDURE — 27000221 HC OXYGEN, UP TO 24 HOURS

## 2022-09-04 PROCEDURE — 87070 CULTURE OTHR SPECIMN AEROBIC: CPT | Performed by: NURSE PRACTITIONER

## 2022-09-04 PROCEDURE — 63600175 PHARM REV CODE 636 W HCPCS: Performed by: STUDENT IN AN ORGANIZED HEALTH CARE EDUCATION/TRAINING PROGRAM

## 2022-09-04 PROCEDURE — 82945 GLUCOSE OTHER FLUID: CPT | Performed by: NURSE PRACTITIONER

## 2022-09-04 PROCEDURE — 27000207 HC ISOLATION

## 2022-09-04 PROCEDURE — U0002 COVID-19 LAB TEST NON-CDC: HCPCS | Performed by: STUDENT IN AN ORGANIZED HEALTH CARE EDUCATION/TRAINING PROGRAM

## 2022-09-04 PROCEDURE — 84100 ASSAY OF PHOSPHORUS: CPT | Performed by: NURSE PRACTITIONER

## 2022-09-04 PROCEDURE — 85025 COMPLETE CBC W/AUTO DIFF WBC: CPT | Performed by: NURSE PRACTITIONER

## 2022-09-04 PROCEDURE — 87040 BLOOD CULTURE FOR BACTERIA: CPT | Performed by: NURSE PRACTITIONER

## 2022-09-04 PROCEDURE — 85610 PROTHROMBIN TIME: CPT | Performed by: NURSE PRACTITIONER

## 2022-09-04 PROCEDURE — 99223 PR INITIAL HOSPITAL CARE,LEVL III: ICD-10-PCS | Mod: ,,, | Performed by: INTERNAL MEDICINE

## 2022-09-04 PROCEDURE — 87205 SMEAR GRAM STAIN: CPT | Performed by: NURSE PRACTITIONER

## 2022-09-04 PROCEDURE — 51702 INSERT TEMP BLADDER CATH: CPT

## 2022-09-04 PROCEDURE — 83615 LACTATE (LD) (LDH) ENZYME: CPT | Performed by: NURSE PRACTITIONER

## 2022-09-04 PROCEDURE — 94761 N-INVAS EAR/PLS OXIMETRY MLT: CPT

## 2022-09-04 PROCEDURE — 63600175 PHARM REV CODE 636 W HCPCS: Performed by: NURSE PRACTITIONER

## 2022-09-04 PROCEDURE — 99223 1ST HOSP IP/OBS HIGH 75: CPT | Mod: ,,, | Performed by: INTERNAL MEDICINE

## 2022-09-04 PROCEDURE — 20000000 HC ICU ROOM

## 2022-09-04 PROCEDURE — 94003 VENT MGMT INPAT SUBQ DAY: CPT

## 2022-09-04 PROCEDURE — 89051 BODY FLUID CELL COUNT: CPT | Performed by: NURSE PRACTITIONER

## 2022-09-04 PROCEDURE — 36600 WITHDRAWAL OF ARTERIAL BLOOD: CPT

## 2022-09-04 PROCEDURE — 87086 URINE CULTURE/COLONY COUNT: CPT | Performed by: NURSE PRACTITIONER

## 2022-09-04 PROCEDURE — 25000003 PHARM REV CODE 250: Performed by: STUDENT IN AN ORGANIZED HEALTH CARE EDUCATION/TRAINING PROGRAM

## 2022-09-04 PROCEDURE — 80053 COMPREHEN METABOLIC PANEL: CPT | Performed by: NURSE PRACTITIONER

## 2022-09-04 PROCEDURE — 99900026 HC AIRWAY MAINTENANCE (STAT)

## 2022-09-04 RX ORDER — NOREPINEPHRINE BITARTRATE/D5W 8 MG/250ML
0-3 PLASTIC BAG, INJECTION (ML) INTRAVENOUS CONTINUOUS
Status: DISCONTINUED | OUTPATIENT
Start: 2022-09-04 | End: 2022-09-08

## 2022-09-04 RX ORDER — VANCOMYCIN HCL IN 5 % DEXTROSE 1G/250ML
15 PLASTIC BAG, INJECTION (ML) INTRAVENOUS
Status: DISCONTINUED | OUTPATIENT
Start: 2022-09-05 | End: 2022-09-05

## 2022-09-04 RX ORDER — MAGNESIUM SULFATE HEPTAHYDRATE 40 MG/ML
2 INJECTION, SOLUTION INTRAVENOUS ONCE
Status: COMPLETED | OUTPATIENT
Start: 2022-09-04 | End: 2022-09-04

## 2022-09-04 RX ADMIN — NOREPINEPHRINE BITARTRATE 8 MG: 1 INJECTION, SOLUTION, CONCENTRATE INTRAVENOUS at 12:09

## 2022-09-04 RX ADMIN — LACTULOSE 20 G: 20 SOLUTION ORAL at 08:09

## 2022-09-04 RX ADMIN — NOREPINEPHRINE BITARTRATE 0.07 MCG/KG/MIN: 8 INJECTION, SOLUTION INTRAVENOUS at 05:09

## 2022-09-04 RX ADMIN — VANCOMYCIN HYDROCHLORIDE 1500 MG: 1.5 INJECTION, POWDER, LYOPHILIZED, FOR SOLUTION INTRAVENOUS at 05:09

## 2022-09-04 RX ADMIN — CHLORHEXIDINE GLUCONATE 0.12% ORAL RINSE 15 ML: 1.2 LIQUID ORAL at 08:09

## 2022-09-04 RX ADMIN — LACTULOSE 20 G: 20 SOLUTION ORAL at 02:09

## 2022-09-04 RX ADMIN — PROPOFOL 35 MCG/KG/MIN: 10 INJECTION, EMULSION INTRAVENOUS at 04:09

## 2022-09-04 RX ADMIN — REMDESIVIR 100 MG: 100 INJECTION, POWDER, LYOPHILIZED, FOR SOLUTION INTRAVENOUS at 09:09

## 2022-09-04 RX ADMIN — POTASSIUM BICARBONATE 50 MEQ: 977.5 TABLET, EFFERVESCENT ORAL at 06:09

## 2022-09-04 RX ADMIN — DEXMEDETOMIDINE HYDROCHLORIDE 0.3 MCG/KG/HR: 4 INJECTION INTRAVENOUS at 12:09

## 2022-09-04 RX ADMIN — PROPOFOL 25 MCG/KG/MIN: 10 INJECTION, EMULSION INTRAVENOUS at 11:09

## 2022-09-04 RX ADMIN — PIPERACILLIN SODIUM AND TAZOBACTAM SODIUM 4.5 G: 4; .5 INJECTION, POWDER, LYOPHILIZED, FOR SOLUTION INTRAVENOUS at 06:09

## 2022-09-04 RX ADMIN — REMDESIVIR 200 MG: 100 INJECTION, POWDER, LYOPHILIZED, FOR SOLUTION INTRAVENOUS at 12:09

## 2022-09-04 RX ADMIN — Medication 100 MG: at 08:09

## 2022-09-04 RX ADMIN — DEXAMETHASONE SODIUM PHOSPHATE 6 MG: 4 INJECTION INTRA-ARTICULAR; INTRALESIONAL; INTRAMUSCULAR; INTRAVENOUS; SOFT TISSUE at 08:09

## 2022-09-04 RX ADMIN — FOLIC ACID 1 MG: 1 TABLET ORAL at 08:09

## 2022-09-04 RX ADMIN — NOREPINEPHRINE BITARTRATE 0.1 MCG/KG/MIN: 8 INJECTION, SOLUTION INTRAVENOUS at 01:09

## 2022-09-04 RX ADMIN — DEXAMETHASONE SODIUM PHOSPHATE 6 MG: 4 INJECTION INTRA-ARTICULAR; INTRALESIONAL; INTRAMUSCULAR; INTRAVENOUS; SOFT TISSUE at 12:09

## 2022-09-04 RX ADMIN — LEVOFLOXACIN 750 MG: 750 INJECTION, SOLUTION INTRAVENOUS at 11:09

## 2022-09-04 RX ADMIN — PANTOPRAZOLE SODIUM 40 MG: 40 GRANULE, DELAYED RELEASE ORAL at 08:09

## 2022-09-04 RX ADMIN — MAGNESIUM SULFATE 2 G: 2 INJECTION INTRAVENOUS at 02:09

## 2022-09-04 NOTE — H&P
[facility] - Cardiac Medical ICU  Critical Care Medicine  History & Physical    Patient Name: Porfirio Wilson  MRN: [id number]  Admission Date: 9/3/2022  Hospital Length of Stay: 0 days  Code Status: Full Code  Attending Physician: Tisha Castro DO   Primary Care Provider: Primary Doctor No   Principal Problem: Acute hypoxemic respiratory failure    Subjective:     HPI:  Patient is a 36 y.o. male with significant past medical history of ETOH cirrhosis (complicated by presence of esophageal varices s/p band ligation), bipolar d/o and pancreatitis presented to [facility] on 9/3 complaining of shortness of breath x2 hours with associated fever/chills, cough and diaphoresis. Patient was intubated in the ED due to degree of respiratory distress. Cxry showed right pleural effusion and pneumothorax and focal consolidation on the left. He was found to be Covid positive. Patient had a right side chest tube placed. Labs at OSH significant for leukocytosis of 16, procal 0.46, lactic 5, , INR 2.1, negative troponin, ETOH negative and ammonia 86.  Pt was tx w/ levaquin & flagyl, as well as given IVF & vit K for coagulopathy associated w/ liver dz. Pt is currently requiring levo for map > 65. Last reported ETOH drink ~ 6 weeks ago.     Patient [facility] for higher level of care and Hepatology services.       Hospital/ICU Course:  No notes on file     Past Medical History:   Diagnosis Date    GERD (gastroesophageal reflux disease)     History of alcohol abuse        Past Surgical History:   Procedure Laterality Date    EGD, WITH BANDING OF VARICES         Review of patient's allergies indicates:   Allergen Reactions    Ampicillin     Ceclor [cefaclor]     Penicillins        Family History       Family history is unknown by patient.          Tobacco Use    Smoking status: Every Day     Types: Cigarettes    Smokeless tobacco: Never   Substance and Sexual Activity    Alcohol  use: Yes     Comment: pint of vodka every day     Drug use: Yes     Types: Marijuana    Sexual activity: Not Currently      Review of Systems   Unable to perform ROS: Intubated   Objective:     Vital Signs (Most Recent):    Vital Signs (24h Range):  Temp:  [97.3 °F (36.3 °C)] 97.3 °F (36.3 °C)  Pulse:  [60-70] 60  Resp:  [16] 16  SpO2:  [99 %-100 %] 99 %  BP: (95-96)/(57-58) 95/58      There is no height or weight on file to calculate BMI.    No intake or output data in the 24 hours ending 09/03/22 5545    Physical Exam  Vitals and nursing note reviewed.   Constitutional:       Appearance: He is ill-appearing.   HENT:      Head: Normocephalic and atraumatic.      Right Ear: External ear normal.      Left Ear: External ear normal.      Nose: Nose normal.      Mouth/Throat:      Mouth: Mucous membranes are moist.      Pharynx: Oropharynx is clear.   Eyes:      General: Scleral icterus present.      Pupils: Pupils are equal, round, and reactive to light.   Cardiovascular:      Rate and Rhythm: Normal rate and regular rhythm.      Pulses: Normal pulses.      Heart sounds: Normal heart sounds. No murmur heard.  Pulmonary:      Comments: Mechanically ventilated. R surgical chest tube with serous drainage.   Abdominal:      General: Bowel sounds are normal. There is no distension.      Palpations: Abdomen is soft.      Tenderness: There is no abdominal tenderness.   Musculoskeletal:         General: No swelling or deformity.      Cervical back: Normal range of motion and neck supple.   Skin:     General: Skin is dry.      Capillary Refill: Capillary refill takes less than 2 seconds.      Coloration: Skin is jaundiced.      Comments: Cool lower extremities   Neurological:      GCS: GCS eye subscore is 1. GCS verbal subscore is 1. GCS motor subscore is 4.      Comments: Chemically sedated       Vents:     Lines/Drains/Airways       None                 Significant Labs:    CBC/Anemia Profile:  No results for input(s): WBC,  HGB, HCT, PLT, MCV, RDW, IRON, FERRITIN, RETIC, FOLATE, [id number], OCCULTBLOOD in the last 48 hours.     Chemistries:  No results for input(s): NA, K, CL, CO2, BUN, CREATININE, CALCIUM, ALBUMIN, PROT, BILITOT, ALKPHOS, ALT, AST, GLUCOSE, MG, PHOS in the last 48 hours.    All pertinent labs within the past 24 hours have been reviewed.    Significant Imaging: I have reviewed all pertinent imaging results/findings within the past 24 hours.  Assessment/Plan:     Neuro  Seizure  --reported to have full tonic-clonic seizure like activity lasting ~ 1 min and resolved spontaneously at onset of transfer from OSH per EMS  --checking head CT  --if occurs again, will obtain EEG    Pulmonary  * Acute hypoxemic respiratory failure  Patient with Hypoxic Respiratory failure which is Acute.  he is not on home oxygen. Supplemental oxygen was provided and noted-  .   Signs/symptoms of respiratory failure include- tachypnea, increased work of breathing and respiratory distress. Contributing diagnoses includes - Pleural effusion, Pneumonia and pneumothorax Labs and images were reviewed. Patient Has not had a recent ABG. Will treat underlying causes and adjust management of respiratory failure as follows-   --Covid-19 positive (pt not vaccinated); concern for superimposed bacterial pna given leukocytosis and elevated procal  --right plural effusion (suspect hepatohydrothorax) + pneumothorax (unclear if procedural complication) s/p chest tube placement at OSH  --start dex + remdesivir; consider adding baricitinib on hospital day 2 if no improvement on dex/remdesivir  --continue abx coverage for suspected bacterial pna  --maintain chest tube to suction  --LPV; wean as able. Currently requiring peep 5, 40% fio2  --checking blood and sputum cx; f/u results & tailor abx as apporpriate  --sending pleural fluid studies  --checking CT chest    GI  Decompensated hepatic cirrhosis  --MELD-Na 27  --OSH ammonia 87; continue lactulose  --OSH INR  2.1; continue vit K x3 days  --checking PETH  --holding home propranolol in setting of shock requiring vasopressors  --transferred for Hepatology services, consult in am      Critical Care Daily Checklist:    A: Awake: RASS Goal/Actual Goal:    Actual:     B: Spontaneous Breathing Trial Performed?  Not appropriate at this time   C: SAT & SBT Coordinated?  As above                      D: Delirium: CAM-ICU     E: Early Mobility Performed? Yes   F: Feeding Goal:    Status:     Current Diet Order   No orders of the defined types were placed in this encounter.      AS: Analgesia/Sedation propofol   T: Thromboembolic Prophylaxis scds   H: HOB > 300 Yes   U: Stress Ulcer Prophylaxis (if needed) pepcid   G: Glucose Control    B: Bowel Function     I: Indwelling Catheter (Lines & Ashby) Necessity R subclavian TLC, PIV x2, ETT, OGT, ashby   D: De-escalation of Antimicrobials/Pharmacotherapies levaquin    Plan for the day/ETD W/u & tx resp failure    Code Status:  Family/Goals of Care: Full Code  Will update family via phone     Case discussed with CCM Fellow Dr. Kyler Bazan.     Critical Care Time: 50 minutes  Critical secondary to Patient has a condition that poses threat to life and bodily function: respiratory failure requiring mechanical ventilation     Critical care was time spent personally by me on the following activities: development of treatment plan with patient or surrogate and bedside caregivers, discussions with consultants, evaluation of patient's response to treatment, examination of patient, ordering and performing treatments and interventions, ordering and review of laboratory studies, ordering and review of radiographic studies, pulse oximetry, re-evaluation of patient's condition. This critical care time did not overlap with that of any other provider or involve time for any procedures.     Becca Perez NP  Critical Care Medicine  [facility] - Cardiac Medical ICU

## 2022-09-04 NOTE — PROGRESS NOTES
Pharmacokinetic Initial Assessment: IV Vancomycin    Assessment/Plan:    Initiate intravenous vancomycin with loading dose of 1500 mg once followed by a maintenance dose of vancomycin 1000mg IV every 8 hours  Desired empiric serum trough concentration is 15 to 20 mcg/mL  Draw vancomycin trough level 60 min prior to fourth dose on 9/5/2022 at approximately 1600  Pharmacy will continue to follow and monitor vancomycin.      Patient is young and within normal weight range with good renal function, so chose to go with q8h dosing. Level will be checked tomorrow by clinical midshift pharmacist and adjusted as needed. Went with a slightly lower dose since q8h.     Estimated AUC:KALANI within range at 402    Please contact pharmacy at extension [phone] with any questions regarding this assessment.     Thank you for the consult,   Derik Villegas, Pharm.D.  Inpatient Pharmacist       Patient brief summary:  Porfirio Wilson is a 36 y.o. male initiated on antimicrobial therapy with IV Vancomycin for treatment of suspected bacteremia    Drug Allergies:   Review of patient's allergies indicates:   Allergen Reactions    Ampicillin     Ceclor [cefaclor]     Penicillins        Actual Body Weight:   72 kg  BMI: 21.53    Renal Function:   Estimated Creatinine Clearance: 131.3 mL/min (based on SCr of 0.8 mg/dL).,     Dialysis Method (if applicable):  N/A    CBC (last 72 hours):  Recent Labs   Lab Result Units 09/03/22 2358 09/04/22  0338   WBC K/uL 8.32 10.06   Hemoglobin g/dL 9.5* 10.0*   Hematocrit % 28.7* 31.5*   Platelets K/uL 65* 75*   Gran % % 78.4* 93.0*   Lymph % % 11.7* 4.1*   Mono % % 7.8 2.0*   Eosinophil % % 1.2 0.3   Basophil % % 0.4 0.1   Differential Method  Automated Automated       Metabolic Panel (last 72 hours):  Recent Labs   Lab Result Units 09/03/22  2354 09/04/22  0338 09/04/22  0605   Sodium mmol/L 134* 134*  --    Potassium mmol/L 3.5 3.4*  --    Chloride mmol/L 105 106  --    CO2 mmol/L 21* 21*  --    Glucose mg/dL  99 141*  --    Glucose, UA   --   --  Negative   BUN mg/dL 14 13  --    Creatinine mg/dL 0.8 0.8  --    Albumin g/dL 1.8* 1.8*  --    Total Bilirubin mg/dL 7.8* 8.2*  --    Alkaline Phosphatase U/L 104 111  --    AST U/L 100* 79*  --    ALT U/L 37 34  --    Magnesium mg/dL 1.5* 1.8  --    Phosphorus mg/dL 3.3 2.5*  --        Drug levels (last 3 results):  No results for input(s): VANCOMYCINRA, VANCORANDOM, VANCOMYCINPE, VANCOPEAK, VANCOMYCINTR, VANCOTROUGH in the last 72 hours.    Microbiologic Results:  Microbiology Results (last 7 days)       Procedure Component Value Units Date/Time    Gram stain [[id number]] Collected: 09/03/22 2359    Order Status: Completed Specimen: Pleural Fluid Updated: 09/04/22 0937     Gram Stain Result No WBC's      No organisms seen    Blood culture (site 1) [[id number]] Collected: 09/04/22 0012    Order Status: Completed Specimen: Blood from Peripheral, Forearm, Left Updated: 09/04/22 0715     Blood Culture, Routine No Growth to date    Narrative:      Site # 1, aerobic and anaerobic    Blood culture (site 2) [[id number]] Collected: 09/03/22 2357    Order Status: Completed Specimen: Blood from Peripheral, Antecubital, Left Updated: 09/04/22 0715     Blood Culture, Routine No Growth to date    Narrative:      Site # 2, aerobic only    Urine culture [[id number]] Collected: 09/04/22 0605    Order Status: No result Specimen: Urine Updated: 09/04/22 0632    Aerobic culture [[id number]] Collected: 09/03/22 2359    Order Status: Sent Specimen: Pleural Fluid Updated: 09/04/22 0523    Culture, Anaerobic [[id number]] Collected: 09/03/22 2359    Order Status: Sent Specimen: Pleural Fluid Updated: 09/04/22 0522    AFB Culture & Smear [[id number]] Collected: 09/03/22 2359    Order Status: Sent Specimen: Pleural Fluid Updated: 09/04/22 0518    Culture, Respiratory with Gram Stain [[id number]] Collected: 09/04/22 0029    Order Status: Completed Specimen: Sputum, Expectorated Updated: 09/04/22 0333     Gram  Stain (Respiratory) <10 epithelial cells per low power field.     Gram Stain (Respiratory) Moderate WBC's     Gram Stain (Respiratory) No organisms seen

## 2022-09-04 NOTE — PROGRESS NOTES
[facility]  Critical Care Medicine  Progress Note    Patient Name: Porfirio Wilson  MRN: [id number]  Admission Date: 9/3/2022  Hospital Length of Stay: 1 days  Code Status: Full Code  Attending Provider: Tisha Castro DO  Primary Care Provider: Primary Doctor No   Principal Problem: Acute hypoxemic respiratory failure    Subjective:     HPI:  Patient is a 36 y.o. male with significant past medical history of ETOH cirrhosis (complicated by presence of esophageal varices s/p band ligation), bipolar d/o and pancreatitis presented to [facility] on 9/3 complaining of shortness of breath x2 hours with associated fever/chills, cough and diaphoresis. Patient was intubated in the ED due to degree of respiratory distress. Cxry showed right pleural effusion and pneumothorax and focal consolidation on the left. He was found to be Covid positive. Patient had a right side chest tube placed. Labs at OSH significant for leukocytosis of 16, procal 0.46, lactic 5, , INR 2.1, negative troponin, ETOH negative and ammonia 86.  Pt was tx w/ levaquin & flagyl, as well as given IVF & vit K for coagulopathy associated w/ liver dz. Pt is currently requiring levo for map > 65. Last reported ETOH drink ~ 6 weeks ago.     Patient has been transferred to [facility] for higher level of care and Hepatology services.       Hospital/ICU Course:  Pt admitted to [facility] evening of 9/4 for acute hypoxemic respiratory failure and decompensated cirrhosis. Cultures repeated and pt maintained on mechanical ventilation/levo as needed for map > 65. Pt started on dex/remdesivir and continued on levaquin for suspected superimposed bacterial pna. CT chest shows multifocal pna, large right side pleural effusion. There is some concern that the chest tube placed at OSH may be in the lung parenchyma.       Interval History/Significant Events: Admitted overnight. Started on dex/remdesivir. Continued on abx.      Review of Systems   Unable to perform ROS: Intubated   Objective:     Vital Signs (Most Recent):  Temp: (!) 93.7 °F (34.3 °C) (09/04/22 0215)  Pulse: 63 (09/04/22 0215)  Resp: 20 (09/04/22 0215)  BP: 110/65 (09/04/22 0215)  SpO2: 98 % (09/04/22 0215)   Vital Signs (24h Range):  Temp:  [93.7 °F (34.3 °C)-97.3 °F (36.3 °C)] 93.7 °F (34.3 °C)  Pulse:  [57-88] 63  Resp:  [16-30] 20  SpO2:  [95 %-100 %] 98 %  BP: ()/(52-65) 110/65   Weight: 72 kg (158 lb 11.7 oz)  Body mass index is 21.53 kg/m².    No intake or output data in the 24 hours ending 09/04/22 0302    Physical Exam  Vitals and nursing note reviewed.   Constitutional:       Appearance: He is ill-appearing.   HENT:      Head: Normocephalic and atraumatic.      Right Ear: External ear normal.      Left Ear: External ear normal.      Nose: Nose normal.      Mouth/Throat:      Mouth: Mucous membranes are moist.      Pharynx: Oropharynx is clear.   Eyes:      General: Scleral icterus present.      Pupils: Pupils are equal, round, and reactive to light.   Cardiovascular:      Rate and Rhythm: Normal rate and regular rhythm.      Pulses: Normal pulses.      Heart sounds: Normal heart sounds. No murmur heard.  Pulmonary:      Comments: Mechanically ventilated. R surgical chest tube with serous drainage.   Abdominal:      General: Bowel sounds are normal. There is no distension.      Palpations: Abdomen is soft.      Tenderness: There is no abdominal tenderness.   Musculoskeletal:         General: No swelling or deformity.      Cervical back: Normal range of motion and neck supple.   Skin:     General: Skin is dry.      Capillary Refill: Capillary refill takes less than 2 seconds.      Coloration: Skin is jaundiced.      Comments: Cool lower extremities   Neurological:      GCS: GCS eye subscore is 1. GCS verbal subscore is 1. GCS motor subscore is 4.      Comments: Chemically sedated       Vents:  Oxygen Concentration (%): 40 (09/04/22  0215)  Lines/Drains/Airways       Central Venous Catheter Line  Duration             Percutaneous Central Line Insertion/Assessment - Triple Lumen  09/03/22 1500 right subclavian <1 day              Drain  Duration                  NG/OG Tube Center mouth -- days              Airway  Duration                  Airway - Non-Surgical 09/03/22 1100 Endotracheal Tube <1 day              Peripheral Intravenous Line  Duration                  Peripheral IV - Single Lumen 09/03/22 20 G Posterior;Right Hand 1 day         Peripheral IV - Single Lumen 09/03/22 1500 20 G Left;Posterior Hand <1 day                  Significant Labs:    CBC/Anemia Profile:  Recent Labs   Lab 09/03/22  2354 09/03/22  2358   WBC  --  8.32   HGB  --  9.5*   HCT  --  28.7*   PLT  --  65*   MCV  --  96   RDW  --  20.5*   FERRITIN 142  --         Chemistries:  Recent Labs   Lab 09/03/22  2354   *   K 3.5      CO2 21*   BUN 14   CREATININE 0.8   CALCIUM 8.0*   ALBUMIN 1.8*   PROT 6.3   BILITOT 7.8*   ALKPHOS 104   ALT 37   *   MG 1.5*   PHOS 3.3       All pertinent labs within the past 24 hours have been reviewed.    Significant Imaging:  I have reviewed all pertinent imaging results/findings within the past 24 hours.    ABG  Recent Labs   Lab 09/04/22  0052   PH 7.424   PO2 93   PCO2 33.6*   HCO3 22.0*   BE -2     Assessment/Plan:     Neuro  Seizure  --reported to have full tonic-clonic seizure like activity lasting ~ 1 min and resolved spontaneously at onset of transfer from OSH per EMS  --head CT negative for acute intracranial abnormality   --if occurs again, will obtain EEG    ENT  Oropharyngeal bleeding  --~ 100cc blood from oropharynx overnight  --no blood from ETT and no blood from OGT  --? If pt bit tongue during above seizure like activity  --continue daily ppi; if concern develops for GIB will change to IV protonix bid & consult GI       Pulmonary  * Acute hypoxemic respiratory failure  Patient with Hypoxic Respiratory  failure which is Acute.  he is not on home oxygen. Supplemental oxygen was provided and noted- Oxygen Concentration (%):  [40] 40  Resp Rate Total:  [20 br/min-29 br/min] 20 br/min.   Signs/symptoms of respiratory failure include- tachypnea, increased work of breathing and respiratory distress. Contributing diagnoses includes - Pleural effusion, Pneumonia and pneumothorax Labs and images were reviewed. Patient Has not had a recent ABG. Will treat underlying causes and adjust management of respiratory failure as follows-   --Covid-19 positive (pt not vaccinated); concern for superimposed bacterial pna given leukocytosis and elevated procal  --chest CT with bilateral patchy infiltrates consistent with Covid-19  --right plural effusion (suspect hepatohydrothorax) + pneumothorax (unclear if procedural complication) s/p chest tube placement at OSH. CT imaging concerning for chest tube in lung parenchyma. Will consult CT surgery in am.   --continue dex + remdesivir; consider adding baricitinib on hospital day 2 if no improvement on dex/remdesivir  --continue Levaquin for suspected bacterial pna  --minimal residual ptx on chest CT. Will place chest tube to water seal this am  --LPV; wean as able. Currently requiring peep 5, 40% fio2  --f/u blood and sputum cx; f/u results & tailor abx as apporpriate  --f/u pleural fluid studies    GI  Decompensated hepatic cirrhosis  --MELD-Na 23  --OSH ammonia 87; continue lactulose  --f/u PETH  --holding home propranolol in setting of shock requiring vasopressors  --Hepatology consult in am     Critical Care Daily Checklist:    A: Awake: RASS Goal/Actual Goal:    Actual: [place] Agitation Sedation Scale (RASS): Moderate sedation   B: Spontaneous Breathing Trial Performed?  defer to day shift   C: SAT & SBT Coordinated?  As above                D: Delirium: CAM-ICU Overall CAM-ICU: Positive   E: Early Mobility Performed? Yes   F: Feeding Goal:    Status:     Current Diet Order   No  orders of the defined types were placed in this encounter.      AS: Analgesia/Sedation Precedex, propofol   T: Thromboembolic Prophylaxis scds   H: HOB > 300 Yes   U: Stress Ulcer Prophylaxis (if needed) ppi   G: Glucose Control    B: Bowel Function     I: Indwelling Catheter (Lines & Ashby) Necessity R subclavian TLC, ETT, OGT, ashby, R surgical chest tube   D: De-escalation of Antimicrobials/Pharmacotherapies levaquin    Plan for the day/ETD tx resp failure, CTS & Hep consults    Code Status:  Family/Goals of Care: Full Code  Family to be updated by day team     Patient to be evaluated by and further recommendations per Dr. Castro.     Critical Care Time: 40  minutes  Critical secondary to Patient has a condition that poses threat to life and bodily function: respiratory failure requiring mechanical ventilation      Critical care was time spent personally by me on the following activities: development of treatment plan with patient or surrogate and bedside caregivers, discussions with consultants, evaluation of patient's response to treatment, examination of patient, ordering and performing treatments and interventions, ordering and review of laboratory studies, ordering and review of radiographic studies, pulse oximetry, re-evaluation of patient's condition. This critical care time did not overlap with that of any other provider or involve time for any procedures.     Becca Perez NP  Critical Care Medicine  [facility] - Cardiac Medical ICU

## 2022-09-04 NOTE — SUBJECTIVE & OBJECTIVE
Past Medical History:   Diagnosis Date    GERD (gastroesophageal reflux disease)     History of alcohol abuse        Past Surgical History:   Procedure Laterality Date    EGD, WITH BANDING OF VARICES         Review of patient's allergies indicates:   Allergen Reactions    Ampicillin     Ceclor [cefaclor]     Penicillins        Family History       Family history is unknown by patient.          Tobacco Use    Smoking status: Every Day     Types: Cigarettes    Smokeless tobacco: Never   Substance and Sexual Activity    Alcohol use: Yes     Comment: pint of vodka every day     Drug use: Yes     Types: Marijuana    Sexual activity: Not Currently      Review of Systems   Unable to perform ROS: Intubated   Objective:     Vital Signs (Most Recent):    Vital Signs (24h Range):  Temp:  [97.3 °F (36.3 °C)] 97.3 °F (36.3 °C)  Pulse:  [60-70] 60  Resp:  [16] 16  SpO2:  [99 %-100 %] 99 %  BP: (95-96)/(57-58) 95/58      There is no height or weight on file to calculate BMI.    No intake or output data in the 24 hours ending 09/03/22 9191    Physical Exam  Vitals and nursing note reviewed.   Constitutional:       Appearance: He is ill-appearing.   HENT:      Head: Normocephalic and atraumatic.      Right Ear: External ear normal.      Left Ear: External ear normal.      Nose: Nose normal.      Mouth/Throat:      Mouth: Mucous membranes are moist.      Pharynx: Oropharynx is clear.   Eyes:      General: Scleral icterus present.      Pupils: Pupils are equal, round, and reactive to light.   Cardiovascular:      Rate and Rhythm: Normal rate and regular rhythm.      Pulses: Normal pulses.      Heart sounds: Normal heart sounds. No murmur heard.  Pulmonary:      Comments: Mechanically ventilated. R surgical chest tube with serous drainage.   Abdominal:      General: Bowel sounds are normal. There is no distension.      Palpations: Abdomen is soft.      Tenderness: There is no abdominal tenderness.   Musculoskeletal:         General: No  swelling or deformity.      Cervical back: Normal range of motion and neck supple.   Skin:     General: Skin is dry.      Capillary Refill: Capillary refill takes less than 2 seconds.      Coloration: Skin is jaundiced.      Comments: Cool lower extremities   Neurological:      GCS: GCS eye subscore is 1. GCS verbal subscore is 1. GCS motor subscore is 4.      Comments: Chemically sedated       Vents:     Lines/Drains/Airways       None                 Significant Labs:    CBC/Anemia Profile:  No results for input(s): WBC, HGB, HCT, PLT, MCV, RDW, IRON, FERRITIN, RETIC, FOLATE, ANVPDAVO36, OCCULTBLOOD in the last 48 hours.     Chemistries:  No results for input(s): NA, K, CL, CO2, BUN, CREATININE, CALCIUM, ALBUMIN, PROT, BILITOT, ALKPHOS, ALT, AST, GLUCOSE, MG, PHOS in the last 48 hours.    All pertinent labs within the past 24 hours have been reviewed.    Significant Imaging: I have reviewed all pertinent imaging results/findings within the past 24 hours.

## 2022-09-04 NOTE — ASSESSMENT & PLAN NOTE
--MELD-Na 27  --OSH ammonia 87; continue lactulose  --f/u PETH  --holding home propranolol in setting of shock requiring vasopressors  --Hepatology consult in am

## 2022-09-04 NOTE — HOSPITAL COURSE
Pt admitted to [facility] evening of 9/4 for acute hypoxemic respiratory failure and decompensated cirrhosis. Cultures repeated and pt maintained on mechanical ventilation/levo as needed for map > 65. Pt started on dex/remdesivir for COVID. Completed 4 days of abx for possible superimposed pneumonia. CT chest shows multifocal pna, large right side pleural effusion. Abx briefly broadened due to hypothermia - now discontinued due to euthermia, negative culture data. There is some concern that the chest tube placed at OSH may be in the lung parenchyma vs enveloped by re-expanding lung - evaluated by CTS who recommend medical stabilization before any intervention. Chest tube with continued high output - now clamped and pending repeat CT for evaluation of position. Extubated and stable on RA 9/7. Hepatology evaluated and patient will not be considered for transplant due to continued EtOH use.

## 2022-09-04 NOTE — ASSESSMENT & PLAN NOTE
--reported to have full tonic-clonic seizure like activity lasting ~ 1 min and resolved spontaneously at onset of transfer from OSH per EMS  --head CT negative for acute intracranial abnormality   --if occurs again, will obtain EEG

## 2022-09-04 NOTE — ASSESSMENT & PLAN NOTE
--~ 100cc blood from oropharynx overnight  --no blood from ETT and no blood from OGT  --? If pt bit tongue during above seizure like activity  --continue daily ppi; if concern develops for GIB will change to IV protonix bid & consult GI

## 2022-09-04 NOTE — CARE UPDATE
Pt's sister Taylor updated via phone ([phone]). Taylor would like to be first call for patient. Pt's mother Gloria  ([phone]) also has Covid and has underlying cancer.       Becca Perez NP  Critical Care Medicine

## 2022-09-04 NOTE — ASSESSMENT & PLAN NOTE
Patient with Hypoxic Respiratory failure which is Acute.  he is not on home oxygen. Supplemental oxygen was provided and noted- Oxygen Concentration (%):  [40] 40  Resp Rate Total:  [20 br/min-29 br/min] 20 br/min.   Signs/symptoms of respiratory failure include- tachypnea, increased work of breathing and respiratory distress. Contributing diagnoses includes - Pleural effusion, Pneumonia and pneumothorax Labs and images were reviewed. Patient Has not had a recent ABG. Will treat underlying causes and adjust management of respiratory failure as follows-   --Covid-19 positive (pt not vaccinated); concern for superimposed bacterial pna given leukocytosis and elevated procal  --chest CT with bilateral patchy infiltrates consistent with Covid-19  --right plural effusion (suspect hepatohydrothorax) + pneumothorax (unclear if procedural complication) s/p chest tube placement at OSH. CT imaging concerning for chest tube in lung parenchyma. Will consult CT surgery in am.   --continue dex + remdesivir; consider adding baricitinib on hospital day 2 if no improvement on dex/remdesivir  --continue Levaquin for suspected bacterial pna  --minimal residual ptx on chest CT. Will place chest tube to water seal this am  --LPV; wean as able. Currently requiring peep 5, 40% fio2  --f/u blood and sputum cx; f/u results & tailor abx as apporpriate  --f/u pleural fluid studies

## 2022-09-04 NOTE — ASSESSMENT & PLAN NOTE
Patient with Hypoxic Respiratory failure which is Acute.  he is not on home oxygen. Supplemental oxygen was provided and noted-  .   Signs/symptoms of respiratory failure include- tachypnea, increased work of breathing and respiratory distress. Contributing diagnoses includes - Pleural effusion, Pneumonia and pneumothorax Labs and images were reviewed. Patient Has not had a recent ABG. Will treat underlying causes and adjust management of respiratory failure as follows-   --Covid-19 positive (pt not vaccinated); concern for superimposed bacterial pna given leukocytosis and elevated procal  --right plural effusion (suspect hepatohydrothorax) + pneumothorax (unclear if procedural complication) s/p chest tube placement at OSH  --start dex + remdesivir; consider adding baricitinib on hospital day 2 if no improvement on dex/remdesivir  --continue abx coverage for suspected bacterial pna  --maintain chest tube to suction  --LPV; wean as able. Currently requiring peep 5, 40% fio2  --checking blood and sputum cx; f/u results & tailor abx as apporpriate  --sending pleural fluid studies  --checking CT chest

## 2022-09-04 NOTE — HPI
Patient is a 36 y.o. male with significant past medical history of ETOH cirrhosis (complicated by presence of esophageal varices s/p band ligation), bipolar d/o and pancreatitis presented to [facility] on 9/3 complaining of shortness of breath x2 hours with associated fever/chills, cough and diaphoresis. Patient was intubated in the ED due to degree of respiratory distress. Cxry showed right pleural effusion and pneumothorax and focal consolidation on the left. He was found to be Covid positive. Patient had a right side chest tube placed. Labs at OSH significant for leukocytosis of 16, procal 0.46, lactic 5, , INR 2.1, negative troponin, ETOH negative and ammonia 86.  Pt was tx w/ levaquin & flagyl, as well as given IVF & vit K for coagulopathy associated w/ liver dz. Pt is currently requiring levo for map > 65. Last reported ETOH drink ~ 6 weeks ago.     Patient has been transferred to [facility] for higher level of care and Hepatology services.

## 2022-09-04 NOTE — ASSESSMENT & PLAN NOTE
--MELD-Na 27  --OSH ammonia 87; continue lactulose  --OSH INR 2.1; continue vit K x3 days  --checking PETH  --holding home propranolol in setting of shock requiring vasopressors  --transferred for Hepatology services, consult in am

## 2022-09-04 NOTE — SUBJECTIVE & OBJECTIVE
Interval History/Significant Events: Admitted overnight. Started on dex/remdesivir. Continued on abx.     Review of Systems   Unable to perform ROS: Intubated   Objective:     Vital Signs (Most Recent):  Temp: (!) 93.7 °F (34.3 °C) (09/04/22 0215)  Pulse: 63 (09/04/22 0215)  Resp: 20 (09/04/22 0215)  BP: 110/65 (09/04/22 0215)  SpO2: 98 % (09/04/22 0215)   Vital Signs (24h Range):  Temp:  [93.7 °F (34.3 °C)-97.3 °F (36.3 °C)] 93.7 °F (34.3 °C)  Pulse:  [57-88] 63  Resp:  [16-30] 20  SpO2:  [95 %-100 %] 98 %  BP: ()/(52-65) 110/65   Weight: 72 kg (158 lb 11.7 oz)  Body mass index is 21.53 kg/m².    No intake or output data in the 24 hours ending 09/04/22 0302    Physical Exam  Vitals and nursing note reviewed.   Constitutional:       Appearance: He is ill-appearing.   HENT:      Head: Normocephalic and atraumatic.      Right Ear: External ear normal.      Left Ear: External ear normal.      Nose: Nose normal.      Mouth/Throat:      Mouth: Mucous membranes are moist.      Pharynx: Oropharynx is clear.   Eyes:      General: Scleral icterus present.      Pupils: Pupils are equal, round, and reactive to light.   Cardiovascular:      Rate and Rhythm: Normal rate and regular rhythm.      Pulses: Normal pulses.      Heart sounds: Normal heart sounds. No murmur heard.  Pulmonary:      Comments: Mechanically ventilated. R surgical chest tube with serous drainage.   Abdominal:      General: Bowel sounds are normal. There is no distension.      Palpations: Abdomen is soft.      Tenderness: There is no abdominal tenderness.   Musculoskeletal:         General: No swelling or deformity.      Cervical back: Normal range of motion and neck supple.   Skin:     General: Skin is dry.      Capillary Refill: Capillary refill takes less than 2 seconds.      Coloration: Skin is jaundiced.      Comments: Cool lower extremities   Neurological:      GCS: GCS eye subscore is 1. GCS verbal subscore is 1. GCS motor subscore is 4.       Comments: Chemically sedated       Vents:  Oxygen Concentration (%): 40 (09/04/22 0215)  Lines/Drains/Airways       Central Venous Catheter Line  Duration             Percutaneous Central Line Insertion/Assessment - Triple Lumen  09/03/22 1500 right subclavian <1 day              Drain  Duration                  NG/OG Tube Center mouth -- days              Airway  Duration                  Airway - Non-Surgical 09/03/22 1100 Endotracheal Tube <1 day              Peripheral Intravenous Line  Duration                  Peripheral IV - Single Lumen 09/03/22 20 G Posterior;Right Hand 1 day         Peripheral IV - Single Lumen 09/03/22 1500 20 G Left;Posterior Hand <1 day                  Significant Labs:    CBC/Anemia Profile:  Recent Labs   Lab 09/03/22  2354 09/03/22  2358   WBC  --  8.32   HGB  --  9.5*   HCT  --  28.7*   PLT  --  65*   MCV  --  96   RDW  --  20.5*   FERRITIN 142  --         Chemistries:  Recent Labs   Lab 09/03/22  2354   *   K 3.5      CO2 21*   BUN 14   CREATININE 0.8   CALCIUM 8.0*   ALBUMIN 1.8*   PROT 6.3   BILITOT 7.8*   ALKPHOS 104   ALT 37   *   MG 1.5*   PHOS 3.3       All pertinent labs within the past 24 hours have been reviewed.    Significant Imaging:  I have reviewed all pertinent imaging results/findings within the past 24 hours.

## 2022-09-04 NOTE — ASSESSMENT & PLAN NOTE
--reported to have full tonic-clonic seizure like activity lasting ~ 1 min and resolved spontaneously at onset of transfer from OSH per EMS  --checking head CT  --if occurs again, will obtain EEG

## 2022-09-04 NOTE — CONSULTS
[facility] - [facility]  CTS  Consult Note    Inpatient consult to Cardiothoracic Surgery  Consult performed by: Sharla Young MD  Consult ordered by: Becca Perez NP      Subjective:     History of Present Illness:   Porfirio Wilson is a 36 y.o. male with h/o alcoholic cirrhosis c/b esophageal varices, bipolar disorder who presented to outside hospital with SOB. Patient was intubated. CXR showed right pleural effusion. Patient COVID positive. Chest tube was placed and he was transferred to [facility] for higher level of care. CTS consulted for concern of intraparenchymal chest tube. Chest tube with serous output.     No current facility-administered medications on file prior to encounter.     Current Outpatient Medications on File Prior to Encounter   Medication Sig    ferrous sulfate 325 (65 FE) MG EC tablet Take 1 tablet (325 mg total) by mouth once daily.    folic acid (FOLVITE) 1 MG tablet Take 1 tablet (1 mg total) by mouth once daily.    lactulose (CHRONULAC) 10 gram/15 mL solution Take 15 mLs (10 g total) by mouth 3 (three) times daily. TITRATE TO 3-4 BOWEL MOVEMENTS DAILY.    propranoloL (INDERAL) 10 MG tablet Take 1 tablet (10 mg total) by mouth 2 (two) times daily.    thiamine 100 MG tablet Take 1 tablet (100 mg total) by mouth once daily.       Review of patient's allergies indicates:   Allergen Reactions    Ampicillin     Ceclor [cefaclor]     Penicillins        Past Medical History:   Diagnosis Date    GERD (gastroesophageal reflux disease)     History of alcohol abuse      Past Surgical History:   Procedure Laterality Date    EGD, WITH BANDING OF VARICES       Family History       Family history is unknown by patient.          Tobacco Use    Smoking status: Every Day     Types: Cigarettes    Smokeless tobacco: Never   Substance and Sexual Activity    Alcohol use: Yes     Comment: pint of vodka every day     Drug use: Yes     Types: Marijuana    Sexual activity: Not Currently     Review of  Systems   Unable to perform ROS: Intubated   Objective:     Vital Signs (Most Recent):  Temp: (!) 95.7 °F (35.4 °C) (09/04/22 1115)  Pulse: (!) 47 (09/04/22 1115)  Resp: 20 (09/04/22 1115)  BP: (!) 100/59 (09/04/22 1105)  SpO2: 97 % (09/04/22 1115)   Vital Signs (24h Range):  Temp:  [93.7 °F (34.3 °C)-97.3 °F (36.3 °C)] 95.7 °F (35.4 °C)  Pulse:  [47-88] 47  Resp:  [16-30] 20  SpO2:  [95 %-100 %] 97 %  BP: ()/(45-68) 100/59     Weight: 72 kg (158 lb 11.7 oz)  Body mass index is 21.53 kg/m².      Intake/Output Summary (Last 24 hours) at 9/4/2022 1136  Last data filed at 9/4/2022 1100  Gross per 24 hour   Intake 1009.47 ml   Output 700 ml   Net 309.47 ml       Physical Exam  Constitutional:       Comments: Intubated and sedated   Pulmonary:      Comments: Vent Mode: A/C  Oxygen Concentration (%):  [40] 40  Resp Rate Total:  [20 br/min-29 br/min] 20 br/min  Vt Set:  [470 mL] 470 mL  PEEP/CPAP:  [5 cmH20] 5 cmH20  Mean Airway Pressure:  [9.3 cmH20-9.5 cmH20] 9.3 cmH20    Chest tube with serous output      Significant Labs:  CBC:   Recent Labs   Lab 09/04/22 0338   WBC 10.06   RBC 3.39*   HGB 10.0*   HCT 31.5*   PLT 75*   MCV 93   MCH 29.5   MCHC 31.7*     CMP:   Recent Labs   Lab 09/04/22 0338   *   CALCIUM 7.9*   ALBUMIN 1.8*   PROT 6.1   *   K 3.4*   CO2 21*      BUN 13   CREATININE 0.8   ALKPHOS 111   ALT 34   AST 79*   BILITOT 8.2*       Significant Diagnostics:  I have reviewed all pertinent imaging results/findings within the past 24 hours.    Assessment/Plan:     Leave chest tube in place. Will discuss removal when patient clinically improves.    Sharla Young MD, PGY-7  Cardiothoracic Surgery   [phone]

## 2022-09-05 PROBLEM — R33.9 URINARY RETENTION: Status: ACTIVE | Noted: 2022-09-05

## 2022-09-05 LAB
ALBUMIN SERPL BCP-MCNC: 1.7 G/DL (ref 3.5–5.2)
ALP SERPL-CCNC: 106 U/L (ref 55–135)
ALT SERPL W/O P-5'-P-CCNC: 32 U/L (ref 10–44)
ANION GAP SERPL CALC-SCNC: 10 MMOL/L (ref 8–16)
AST SERPL-CCNC: 65 U/L (ref 10–40)
BACTERIA UR CULT: NO GROWTH
BASOPHILS # BLD AUTO: 0.02 K/UL (ref 0–0.2)
BASOPHILS NFR BLD: 0.1 % (ref 0–1.9)
BILIRUB SERPL-MCNC: 7.1 MG/DL (ref 0.1–1)
BUN SERPL-MCNC: 25 MG/DL (ref 6–20)
CALCIUM SERPL-MCNC: 7.9 MG/DL (ref 8.7–10.5)
CHLORIDE SERPL-SCNC: 107 MMOL/L (ref 95–110)
CO2 SERPL-SCNC: 20 MMOL/L (ref 23–29)
CREAT SERPL-MCNC: 1.3 MG/DL (ref 0.5–1.4)
DIFFERENTIAL METHOD: ABNORMAL
EOSINOPHIL # BLD AUTO: 0 K/UL (ref 0–0.5)
EOSINOPHIL NFR BLD: 0 % (ref 0–8)
ERYTHROCYTE [DISTWIDTH] IN BLOOD BY AUTOMATED COUNT: 20 % (ref 11.5–14.5)
EST. GFR  (NO RACE VARIABLE): >60 ML/MIN/1.73 M^2
GLUCOSE SERPL-MCNC: 162 MG/DL (ref 70–110)
HCT VFR BLD AUTO: 30.6 % (ref 40–54)
HGB BLD-MCNC: 9.8 G/DL (ref 14–18)
IMM GRANULOCYTES # BLD AUTO: 0.1 K/UL (ref 0–0.04)
IMM GRANULOCYTES NFR BLD AUTO: 0.7 % (ref 0–0.5)
INR PPP: 1.8 (ref 0.8–1.2)
LYMPHOCYTES # BLD AUTO: 0.8 K/UL (ref 1–4.8)
LYMPHOCYTES NFR BLD: 5.9 % (ref 18–48)
MAGNESIUM SERPL-MCNC: 1.8 MG/DL (ref 1.6–2.6)
MCH RBC QN AUTO: 29.1 PG (ref 27–31)
MCHC RBC AUTO-ENTMCNC: 32 G/DL (ref 32–36)
MCV RBC AUTO: 91 FL (ref 82–98)
MONOCYTES # BLD AUTO: 0.5 K/UL (ref 0.3–1)
MONOCYTES NFR BLD: 3.8 % (ref 4–15)
NEUTROPHILS # BLD AUTO: 12.7 K/UL (ref 1.8–7.7)
NEUTROPHILS NFR BLD: 89.5 % (ref 38–73)
NRBC BLD-RTO: 0 /100 WBC
PHOSPHATE SERPL-MCNC: 3.7 MG/DL (ref 2.7–4.5)
PLATELET # BLD AUTO: 117 K/UL (ref 150–450)
PMV BLD AUTO: 10.2 FL (ref 9.2–12.9)
POTASSIUM SERPL-SCNC: 4.3 MMOL/L (ref 3.5–5.1)
PROT SERPL-MCNC: 6 G/DL (ref 6–8.4)
PROTHROMBIN TIME: 18.5 SEC (ref 9–12.5)
RBC # BLD AUTO: 3.37 M/UL (ref 4.6–6.2)
SODIUM SERPL-SCNC: 137 MMOL/L (ref 136–145)
VANCOMYCIN SERPL-MCNC: 20.9 UG/ML
WBC # BLD AUTO: 14.19 K/UL (ref 3.9–12.7)

## 2022-09-05 PROCEDURE — 83735 ASSAY OF MAGNESIUM: CPT | Performed by: NURSE PRACTITIONER

## 2022-09-05 PROCEDURE — 63600175 PHARM REV CODE 636 W HCPCS: Mod: TB | Performed by: NURSE PRACTITIONER

## 2022-09-05 PROCEDURE — 20000000 HC ICU ROOM

## 2022-09-05 PROCEDURE — 27000207 HC ISOLATION

## 2022-09-05 PROCEDURE — 99291 CRITICAL CARE FIRST HOUR: CPT | Mod: ,,, | Performed by: INTERNAL MEDICINE

## 2022-09-05 PROCEDURE — 25000003 PHARM REV CODE 250

## 2022-09-05 PROCEDURE — 25000003 PHARM REV CODE 250: Performed by: STUDENT IN AN ORGANIZED HEALTH CARE EDUCATION/TRAINING PROGRAM

## 2022-09-05 PROCEDURE — 94003 VENT MGMT INPAT SUBQ DAY: CPT

## 2022-09-05 PROCEDURE — 94761 N-INVAS EAR/PLS OXIMETRY MLT: CPT

## 2022-09-05 PROCEDURE — 63600175 PHARM REV CODE 636 W HCPCS: Performed by: STUDENT IN AN ORGANIZED HEALTH CARE EDUCATION/TRAINING PROGRAM

## 2022-09-05 PROCEDURE — 27200966 HC CLOSED SUCTION SYSTEM

## 2022-09-05 PROCEDURE — 63600175 PHARM REV CODE 636 W HCPCS: Performed by: INTERNAL MEDICINE

## 2022-09-05 PROCEDURE — 85025 COMPLETE CBC W/AUTO DIFF WBC: CPT | Performed by: NURSE PRACTITIONER

## 2022-09-05 PROCEDURE — 99900026 HC AIRWAY MAINTENANCE (STAT)

## 2022-09-05 PROCEDURE — 80202 ASSAY OF VANCOMYCIN: CPT | Performed by: INTERNAL MEDICINE

## 2022-09-05 PROCEDURE — 99291 PR CRITICAL CARE, E/M 30-74 MINUTES: ICD-10-PCS | Mod: ,,, | Performed by: INTERNAL MEDICINE

## 2022-09-05 PROCEDURE — 80053 COMPREHEN METABOLIC PANEL: CPT | Performed by: NURSE PRACTITIONER

## 2022-09-05 PROCEDURE — 27000221 HC OXYGEN, UP TO 24 HOURS

## 2022-09-05 PROCEDURE — 85610 PROTHROMBIN TIME: CPT | Performed by: NURSE PRACTITIONER

## 2022-09-05 PROCEDURE — 25000003 PHARM REV CODE 250: Performed by: NURSE PRACTITIONER

## 2022-09-05 PROCEDURE — 63600175 PHARM REV CODE 636 W HCPCS

## 2022-09-05 PROCEDURE — 84100 ASSAY OF PHOSPHORUS: CPT | Performed by: NURSE PRACTITIONER

## 2022-09-05 PROCEDURE — 99900035 HC TECH TIME PER 15 MIN (STAT)

## 2022-09-05 PROCEDURE — 25000003 PHARM REV CODE 250: Performed by: INTERNAL MEDICINE

## 2022-09-05 RX ORDER — LACTULOSE 10 G/15ML
200 SOLUTION ORAL; RECTAL ONCE
Status: DISCONTINUED | OUTPATIENT
Start: 2022-09-05 | End: 2022-09-06

## 2022-09-05 RX ORDER — MUPIROCIN 20 MG/G
OINTMENT TOPICAL 2 TIMES DAILY
Status: COMPLETED | OUTPATIENT
Start: 2022-09-05 | End: 2022-09-09

## 2022-09-05 RX ORDER — ENOXAPARIN SODIUM 100 MG/ML
40 INJECTION SUBCUTANEOUS EVERY 24 HOURS
Status: DISCONTINUED | OUTPATIENT
Start: 2022-09-05 | End: 2022-09-09

## 2022-09-05 RX ADMIN — PIPERACILLIN SODIUM AND TAZOBACTAM SODIUM 4.5 G: 4; .5 INJECTION, POWDER, LYOPHILIZED, FOR SOLUTION INTRAVENOUS at 08:09

## 2022-09-05 RX ADMIN — PIPERACILLIN SODIUM AND TAZOBACTAM SODIUM 4.5 G: 4; .5 INJECTION, POWDER, LYOPHILIZED, FOR SOLUTION INTRAVENOUS at 04:09

## 2022-09-05 RX ADMIN — MUPIROCIN: 20 OINTMENT TOPICAL at 09:09

## 2022-09-05 RX ADMIN — PROPOFOL 10 MCG/KG/MIN: 10 INJECTION, EMULSION INTRAVENOUS at 06:09

## 2022-09-05 RX ADMIN — PROPOFOL 30 MCG/KG/MIN: 10 INJECTION, EMULSION INTRAVENOUS at 02:09

## 2022-09-05 RX ADMIN — Medication 100 MG: at 10:09

## 2022-09-05 RX ADMIN — CHLORHEXIDINE GLUCONATE 0.12% ORAL RINSE 15 ML: 1.2 LIQUID ORAL at 09:09

## 2022-09-05 RX ADMIN — DEXMEDETOMIDINE HYDROCHLORIDE 0.4 MCG/KG/HR: 4 INJECTION INTRAVENOUS at 06:09

## 2022-09-05 RX ADMIN — LACTULOSE 20 G: 20 SOLUTION ORAL at 09:09

## 2022-09-05 RX ADMIN — ENOXAPARIN SODIUM 40 MG: 100 INJECTION SUBCUTANEOUS at 05:09

## 2022-09-05 RX ADMIN — REMDESIVIR 100 MG: 100 INJECTION, POWDER, LYOPHILIZED, FOR SOLUTION INTRAVENOUS at 10:09

## 2022-09-05 RX ADMIN — FOLIC ACID 1 MG: 1 TABLET ORAL at 10:09

## 2022-09-05 RX ADMIN — VANCOMYCIN HYDROCHLORIDE 1000 MG: 1 INJECTION, POWDER, LYOPHILIZED, FOR SOLUTION INTRAVENOUS at 02:09

## 2022-09-05 RX ADMIN — PROPOFOL 30 MCG/KG/MIN: 10 INJECTION, EMULSION INTRAVENOUS at 08:09

## 2022-09-05 RX ADMIN — LACTULOSE 20 G: 20 SOLUTION ORAL at 10:09

## 2022-09-05 RX ADMIN — PANTOPRAZOLE SODIUM 40 MG: 40 GRANULE, DELAYED RELEASE ORAL at 10:09

## 2022-09-05 RX ADMIN — VANCOMYCIN HYDROCHLORIDE 750 MG: 750 INJECTION, POWDER, LYOPHILIZED, FOR SOLUTION INTRAVENOUS at 03:09

## 2022-09-05 RX ADMIN — PIPERACILLIN SODIUM AND TAZOBACTAM SODIUM 4.5 G: 4; .5 INJECTION, POWDER, LYOPHILIZED, FOR SOLUTION INTRAVENOUS at 11:09

## 2022-09-05 RX ADMIN — DEXAMETHASONE SODIUM PHOSPHATE 6 MG: 4 INJECTION INTRA-ARTICULAR; INTRALESIONAL; INTRAMUSCULAR; INTRAVENOUS; SOFT TISSUE at 10:09

## 2022-09-05 RX ADMIN — LACTULOSE 20 G: 20 SOLUTION ORAL at 03:09

## 2022-09-05 RX ADMIN — MUPIROCIN: 20 OINTMENT TOPICAL at 10:09

## 2022-09-05 NOTE — PROGRESS NOTES
[facility]  Critical Care Medicine  Progress Note    Patient Name: Porfirio Wilson  MRN: [id number]  Admission Date: 9/3/2022  Hospital Length of Stay: 2 days  Code Status: Full Code  Attending Provider: Tisha Castro DO  Primary Care Provider: Primary Doctor No   Principal Problem: Acute hypoxemic respiratory failure    Subjective:     HPI:  Patient is a 36 y.o. male with significant past medical history of ETOH cirrhosis (complicated by presence of esophageal varices s/p band ligation), bipolar d/o and pancreatitis presented to [facility] on 9/3 complaining of shortness of breath x2 hours with associated fever/chills, cough and diaphoresis. Patient was intubated in the ED due to degree of respiratory distress. Cxry showed right pleural effusion and pneumothorax and focal consolidation on the left. He was found to be Covid positive. Patient had a right side chest tube placed. Labs at OSH significant for leukocytosis of 16, procal 0.46, lactic 5, , INR 2.1, negative troponin, ETOH negative and ammonia 86.  Pt was tx w/ levaquin & flagyl, as well as given IVF & vit K for coagulopathy associated w/ liver dz. Pt is currently requiring levo for map > 65. Last reported ETOH drink ~ 6 weeks ago.     Patient has been transferred to [facility] for higher level of care and Hepatology services.           Hospital/ICU Course:  Pt admitted to [facility] evening of 9/4 for acute hypoxemic respiratory failure and decompensated cirrhosis. Cultures repeated and pt maintained on mechanical ventilation/levo as needed for map > 65. Pt started on dex/remdesivir and continued on levaquin for suspected superimposed bacterial pna. CT chest shows multifocal pna, large right side pleural effusion. There is some concern that the chest tube placed at OSH may be in the lung parenchyma - evaluated by CTS who recommend medical stabilization before any intervention. Abx broadened due to hypothermia.  Improving with decreased need for pressors.      Interval History/Significant Events: Euthermic and stable on decreasing pressor support. Intubated. Hawthorne placed by urology with clear urine output. Will trial SAT/SBT today. No BM x 2 days with lactulose - will check KUB.    Review of Systems   Unable to perform ROS: Intubated   Objective:     Vital Signs (Most Recent):  Temp: 98.8 °F (37.1 °C) (09/05/22 1101)  Pulse: 90 (09/05/22 1101)  Resp: (!) 33 (09/05/22 1101)  BP: 108/68 (09/05/22 1101)  SpO2: (!) 94 % (09/05/22 1101)   Vital Signs (24h Range):  Temp:  [95.2 °F (35.1 °C)-99.1 °F (37.3 °C)] 98.8 °F (37.1 °C)  Pulse:  [48-90] 90  Resp:  [20-33] 33  SpO2:  [94 %-100 %] 94 %  BP: ()/(50-70) 108/68   Weight: 72 kg (158 lb 11.7 oz)  Body mass index is 21.53 kg/m².      Intake/Output Summary (Last 24 hours) at 9/5/2022 1129  Last data filed at 9/5/2022 1101  Gross per 24 hour   Intake 1733.81 ml   Output 2154 ml   Net -420.19 ml       Physical Exam  Vitals and nursing note reviewed.   Constitutional:       Appearance: He is ill-appearing.   HENT:      Head: Normocephalic and atraumatic.      Right Ear: External ear normal.      Left Ear: External ear normal.      Nose: Nose normal.      Mouth/Throat:      Mouth: Mucous membranes are moist.      Pharynx: Oropharynx is clear.   Eyes:      General: Scleral icterus present.      Pupils: Pupils are equal, round, and reactive to light.   Cardiovascular:      Rate and Rhythm: Normal rate and regular rhythm.      Pulses: Normal pulses.      Heart sounds: Normal heart sounds. No murmur heard.  Pulmonary:      Comments: Mechanically ventilated. R surgical chest tube with serous drainage.   Abdominal:      General: Bowel sounds are normal. There is no distension.      Palpations: Abdomen is soft.      Tenderness: There is no abdominal tenderness.   Musculoskeletal:         General: No swelling or deformity.      Cervical back: Normal range of motion and neck supple.    Skin:     General: Skin is dry.      Capillary Refill: Capillary refill takes less than 2 seconds.      Coloration: Skin is jaundiced.      Comments: Cool lower extremities   Neurological:      GCS: GCS eye subscore is 1. GCS verbal subscore is 1. GCS motor subscore is 4.      Comments: Chemically sedated       Vents:  Vent Mode: A/C (09/05/22 0823)  Set Rate: 20 BPM (09/05/22 0823)  Vt Set: 470 mL (09/05/22 0823)  PEEP/CPAP: 5 cmH20 (09/05/22 0823)  Oxygen Concentration (%): 40 (09/05/22 1101)  Peak Airway Pressure: 20 cmH2O (09/05/22 0823)  Plateau Pressure: 0 cmH20 (09/05/22 0823)  Total Ve: 9.69 mL (09/05/22 0823)  Negative Inspiratory Force (cm H2O): 0 (09/05/22 0823)  F/VT Ratio<105 (RSBI): (!) 41.32 (09/05/22 0823)  Lines/Drains/Airways       Central Venous Catheter Line  Duration             Percutaneous Central Line Insertion/Assessment - Triple Lumen  09/03/22 1500 right subclavian 1 day              Drain  Duration                  NG/OG Tube Center mouth -- days         Chest Tube 09/03/22 Right Pleural 2 days         Urethral Catheter 09/05/22 0900 <1 day              Airway  Duration                  Airway - Non-Surgical 09/03/22 1100 Endotracheal Tube 2 days              Peripheral Intravenous Line  Duration                  Peripheral IV - Single Lumen 09/03/22 20 G Posterior;Right Hand 2 days         Peripheral IV - Single Lumen 09/03/22 1500 20 G Left;Posterior Hand 1 day                  Significant Labs:    CBC/Anemia Profile:  Recent Labs   Lab 09/03/22 2354 09/03/22 2358 09/04/22 0338 09/05/22  0209   WBC  --  8.32 10.06 14.19*   HGB  --  9.5* 10.0* 9.8*   HCT  --  28.7* 31.5* 30.6*   PLT  --  65* 75* 117*   MCV  --  96 93 91   RDW  --  20.5* 20.0* 20.0*   FERRITIN 142  --   --   --           Chemistries:  Recent Labs   Lab 09/03/22 2354 09/04/22 0338 09/05/22  0209   * 134* 137   K 3.5 3.4* 4.3    106 107   CO2 21* 21* 20*   BUN 14 13 25*   CREATININE 0.8 0.8 1.3   CALCIUM  8.0* 7.9* 7.9*   ALBUMIN 1.8* 1.8* 1.7*   PROT 6.3 6.1 6.0   BILITOT 7.8* 8.2* 7.1*   ALKPHOS 104 111 106   ALT 37 34 32   * 79* 65*   MG 1.5* 1.8 1.8   PHOS 3.3 2.5* 3.7         All pertinent labs within the past 24 hours have been reviewed.    Significant Imaging:  I have reviewed all pertinent imaging results/findings within the past 24 hours.      ABG  Recent Labs   Lab 09/04/22  0052   PH 7.424   PO2 93   PCO2 33.6*   HCO3 22.0*   BE -2     Assessment/Plan:     Neuro  Seizure  --reported to have full tonic-clonic seizure like activity lasting ~ 1 min and resolved spontaneously at onset of transfer from OSH per EMS  --head CT negative for acute intracranial abnormality   --if occurs again, will obtain EEG    ENT  Oropharyngeal bleeding  --~ 100cc blood from oropharynx overnight  --no blood from ETT and no blood from OGT  --? If pt bit tongue during above seizure like activity  --continue daily ppi; if concern develops for GIB will change to IV protonix bid & consult GI       Pulmonary  * Acute hypoxemic respiratory failure  Patient with Hypoxic Respiratory failure which is Acute.  he is not on home oxygen. Supplemental oxygen was provided and noted- Vent Mode: A/C  Oxygen Concentration (%):  [40] 40  Resp Rate Total:  [20 br/min-26 br/min] 26 br/min  Vt Set:  [470 mL] 470 mL  PEEP/CPAP:  [5 cmH20] 5 cmH20  Mean Airway Pressure:  [8.3 cmH20-9.2 cmH20] 9 cmH20.   Signs/symptoms of respiratory failure include- tachypnea, increased work of breathing and respiratory distress. Contributing diagnoses includes - Pleural effusion, Pneumonia and pneumothorax Labs and images were reviewed. Patient Has not had a recent ABG. Will treat underlying causes and adjust management of respiratory failure as follows-   --Covid-19 positive (pt not vaccinated); concern for superimposed bacterial pna given leukocytosis and elevated procal  --chest CT with bilateral patchy infiltrates consistent with Covid-19  --right plural  effusion (suspect hepatohydrothorax) + pneumothorax (unclear if procedural complication) s/p chest tube placement at OSH. CT imaging concerning for chest tube in lung parenchyma. CTS evaluation with recs for stabilization prior to any intervention  --continue dex + remdesivir; consider adding baricitinib on hospital day 2 if no improvement on dex/remdesivir  --continue Levaquin for suspected bacterial pna - abx broadened due to hypothermia  --minimal residual ptx on chest CT. Will place chest tube to water seal this am  --LPV; wean as able. Currently requiring peep 5, 40% fio2  --f/u blood and sputum cx; f/u results & tailor abx as apporpriate  --f/u pleural fluid studies    Renal/  Urinary retention  S/p ashby placement 9/5 with clear urine output.     GI  Decompensated hepatic cirrhosis  2/2 alcoholic cirrhosis. C/b variceal hemorrhage and possible HE. 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 Hepatology with reccs for medical stabilization prior to eval for possible transplant.    MELD-Na score: 23 at 9/5/2022  2:09 AM  MELD score: 23 at 9/5/2022  2:09 AM  Calculated from:  Serum Creatinine: 1.3 mg/dL at 9/5/2022  2:09 AM  Serum Sodium: 137 mmol/L at 9/5/2022  2:09 AM  Total Bilirubin: 7.1 mg/dL at 9/5/2022  2:09 AM  INR(ratio): 1.8 at 9/5/2022  2:09 AM  Age: 37 years    --OSH ammonia 87; continue lactulose - no BMs x 2 days - checking KUB prior to titrating lactulose  --f/u PETH  --holding home propranolol in setting of shock requiring vasopressors  --Hepatology consult, appreciate assistance     Critical Care Daily Checklist:    A: Awake: RASS Goal/Actual Goal:    Actual: Elizondo Agitation Sedation Scale (RASS): Moderate sedation   B: Spontaneous Breathing Trial Performed?     C: SAT & SBT Coordinated?  Yes                      D: Delirium: CAM-ICU Overall CAM-ICU: Positive   E: Early Mobility Performed? Yes   F: Feeding Goal:    Status:     Current Diet Order   No orders of the defined  types were placed in this encounter.      AS: Analgesia/Sedation Propofol   T: Thromboembolic Prophylaxis Lovenox   H: HOB > 300 Yes   U: Stress Ulcer Prophylaxis (if needed) PPI   G: Glucose Control 140-180   B: Bowel Function     I: Indwelling Catheter (Lines & Hawthorne) Necessity Hawthorne, PIV, chest tube, R subclavian CVC   D: De-escalation of Antimicrobials/Pharmacotherapies Broad spectrum pending cultures    Plan for the day/ETD SAT/SBT    Code Status:  Family/Goals of Care: Full Code  Continue care       Critical secondary to Patient has a condition that poses threat to life and bodily function: Severe Respiratory Distress      Critical care was time spent personally by me on the following activities: development of treatment plan with patient or surrogate and bedside caregivers, discussions with consultants, evaluation of patient's response to treatment, examination of patient, ordering and performing treatments and interventions, ordering and review of laboratory studies, ordering and review of radiographic studies, pulse oximetry, re-evaluation of patient's condition. This critical care time did not overlap with that of any other provider or involve time for any procedures.     Abdiaziz Garay MD  Critical Care Medicine  [facility] - Cardiac Medical ICU

## 2022-09-05 NOTE — CONSULTS
[facility] - Card[facility] Medical [facility]  Urology  Consult Note    Patient Name: Porfirio Wilson  MRN: [id number]  Admission Date: 9/3/2022  Hospital Length of Stay: 2   Code Status: Full Code   Attending Provider: Tisha Castro DO   Consulting Provider: Mamta Beasley MD  Primary Care Physician: Primary Doctor No  Principal Problem:Acute hypoxemic respiratory failure    Inpatient consult to Urology  Consult performed by: Mamta Beasley MD  Consult ordered by: Becca Perez NP          Subjective:     HPI:  This is a 37 YOM with ETOH cirrhosis, bipolar disorder and pancreatitis presenting from OSH intubated with chest tube due to hypoxic respiratory failure with right pleural fluid. Urology was consulted for difficult ashby catheter placement. Reportedly patient had a red rubber catheter in place that was draining but fell out and was unable to be replaced.     CT CAP on 9/4 showed no hydronephrosis bilaterally.         Past Medical History:   Diagnosis Date    GERD (gastroesophageal reflux disease)     History of alcohol abuse        Past Surgical History:   Procedure Laterality Date    EGD, WITH BANDING OF VARICES         Review of patient's allergies indicates:   Allergen Reactions    Ampicillin     Ceclor [cefaclor]     Penicillins        Family History       Family history is unknown by patient.            Tobacco Use    Smoking status: Every Day     Types: Cigarettes    Smokeless tobacco: Never   Substance and Sexual Activity    Alcohol use: Yes     Comment: pint of vodka every day     Drug use: Yes     Types: Marijuana    Sexual activity: Not Currently       Review of Systems   Unable to perform ROS: Intubated     Objective:     Temp:  [95.2 °F (35.1 °C)-99.1 °F (37.3 °C)] 98.4 °F (36.9 °C)  Pulse:  [47-87] 60  Resp:  [20-22] 20  SpO2:  [95 %-100 %] 96 %  BP: ()/(50-70) 104/64     Body mass index is 21.53 kg/m².    Date 09/05/22 0700 - 09/06/22 0659   Shift 5665-2816 8369-2966 8211-2515 24 Hour  Total   INTAKE   I.V.(mL/kg) 48.1(0.7)   48.1(0.7)   IV Piggyback 52.5   52.5   Shift Total(mL/kg) 100.5(1.4)   100.5(1.4)   OUTPUT   Shift Total(mL/kg)       Weight (kg) 72 72 72 72     Bladder Scan Volume (mL): 200 mL (09/05/22 0409)    Drains       Drain  Duration                  NG/OG Tube Center mouth -- days         Chest Tube 09/03/22 Right Pleural 2 days                    Physical Exam  HENT:      Head: Normocephalic and atraumatic.   Pulmonary:      Comments: Intubated  Genitourinary:     Comments: Circumcised penis, condom catheter in place with no urine output, scrotal exam unremarkable   Neurological:      Comments: Sedated       Significant Labs:    BMP:  Recent Labs   Lab 09/03/22 2354 09/04/22 0338 09/05/22  0209   * 134* 137   K 3.5 3.4* 4.3    106 107   CO2 21* 21* 20*   BUN 14 13 25*   CREATININE 0.8 0.8 1.3   CALCIUM 8.0* 7.9* 7.9*       CBC:  Recent Labs   Lab 09/03/22 2358 09/04/22 0338 09/05/22  0209   WBC 8.32 10.06 14.19*   HGB 9.5* 10.0* 9.8*   HCT 28.7* 31.5* 30.6*   PLT 65* 75* 117*       All pertinent labs results from the past 24 hours have been reviewed.    Significant Imaging:  All pertinent imaging results/findings from the past 24 hours have been reviewed.                      Assessment and Plan:     Urinary retention  Urology consulted for difficult ashby catheter placement in this critically ill patient     - 16 fr silicone catheter placed without issue using sterile technique, 350cc clear danny urine return    - ashby catheter per primary, recommend dc ashby when patient is extubated and alert           VTE Risk Mitigation (From admission, onward)           Ordered     IP VTE LOW RISK PATIENT  Once         09/03/22 2258     Place sequential compression device  Until discontinued         09/03/22 2258                    Thank you for your consult. I will sign off. Please contact us if you have any additional questions.    Mamta Beasley MD  Urology  [facility]  - Cardiac Medical ICU    As above.

## 2022-09-05 NOTE — HPI
This is a 37 YOM with ETOH cirrhosis, bipolar disorder and pancreatitis presenting from OSH intubated with chest tube due to hypoxic respiratory failure with right pleural fluid. Urology was consulted for difficult ashby catheter placement. Reportedly patient had a red rubber catheter in place that was draining but fell out and was unable to be replaced.     CT CAP on 9/4 showed no hydronephrosis bilaterally.

## 2022-09-05 NOTE — PLAN OF CARE
Problem: Adult Inpatient Plan of Care  Goal: Plan of Care Review  Outcome: Ongoing, Progressing  Goal: Patient-Specific Goal (Individualized)  Outcome: Ongoing, Progressing  Goal: Absence of Hospital-Acquired Illness or Injury  Outcome: Ongoing, Progressing  Goal: Optimal Comfort and Wellbeing  Outcome: Ongoing, Progressing  Goal: Readiness for Transition of Care  Outcome: Ongoing, Progressing     Problem: Fall Injury Risk  Goal: Absence of Fall and Fall-Related Injury  Outcome: Ongoing, Progressing     Problem: Restraint, Nonbehavioral (Nonviolent)  Goal: Absence of Harm or Injury  Outcome: Ongoing, Progressing     Problem: Infection  Goal: Absence of Infection Signs and Symptoms  Outcome: Ongoing, Progressing     Problem: Communication Impairment (Mechanical Ventilation, Invasive)  Goal: Effective Communication  Outcome: Ongoing, Progressing     Problem: Device-Related Complication Risk (Mechanical Ventilation, Invasive)  Goal: Optimal Device Function  Outcome: Ongoing, Progressing     Problem: Inability to Wean (Mechanical Ventilation, Invasive)  Goal: Mechanical Ventilation Liberation  Outcome: Ongoing, Progressing     Problem: Nutrition Impairment (Mechanical Ventilation, Invasive)  Goal: Optimal Nutrition Delivery  Outcome: Ongoing, Progressing     Problem: Skin and Tissue Injury (Mechanical Ventilation, Invasive)  Goal: Absence of Device-Related Skin and Tissue Injury  Outcome: Ongoing, Progressing     Problem: Ventilator-Induced Lung Injury (Mechanical Ventilation, Invasive)  Goal: Absence of Ventilator-Induced Lung Injury  Outcome: Ongoing, Progressing     Problem: Communication Impairment (Artificial Airway)  Goal: Effective Communication  Outcome: Ongoing, Progressing     Problem: Device-Related Complication Risk (Artificial Airway)  Goal: Optimal Device Function  Outcome: Ongoing, Progressing     Problem: Skin and Tissue Injury (Artificial Airway)  Goal: Absence of Device-Related Skin or Tissue  Injury  Outcome: Ongoing, Progressing     Problem: Noninvasive Ventilation Acute  Goal: Effective Unassisted Ventilation and Oxygenation  Outcome: Ongoing, Progressing     Problem: Skin Injury Risk Increased  Goal: Skin Health and Integrity  Outcome: Ongoing, Progressing     CMICU DAILY GOALS       A: Awake    RASS: Goal -    Actual - RASS (Elizondo Agitation-Sedation Scale): -3-->moderate sedation   Restraint necessity: Clinical Justification: Treatment Interference  B: Breathe   SBT: Fail   C: Coordinate A & B, analgesics/sedatives   Pain: managed    SAT: Fail  D: Delirium   CAM-ICU: Overall CAM-ICU: Positive  E: Early(intubated/ Progressive (non-intubated) Mobility   MOVE Screen: Fail   Activity: Activity Management: Arm raise - L1  FAS: Feeding/Nutrition   Diet order: Diet/Nutrition Received: NPO,    T: Thrombus   DVT prophylaxis: VTE Required Core Measure: (SCDs) Sequential compression device initiated/maintained  H: HOB Elevation   Head of Bed (HOB) Positioning: HOB at 30-45 degrees  U: Ulcer Prophylaxis   GI: yes  G: Glucose control   managed    S: Skin   Bathing/Skin Care: linen changed, dressed/undressed, electrode patches/site rotation, foot care  Device Skin Pressure Protection: absorbent pad utilized/changed, pressure points protected  Pressure Reduction Devices: foam padding utilized, specialty bed utilized  Pressure Reduction Techniques: frequent weight shift encouraged, weight shift assistance provided  Skin Protection: drying agents applied, hydrocolloids used, incontinence pads utilized, tubing/devices free from skin contact  B: Bowel Function   constipation   I: Indwelling Catheters   Hawthorne necessity: [REMOVED]      Urethral Catheter 09/04/22 0600 Coude 14 Fr.-Reason for Continuing Urinary Catheterization: Critically ill in ICU and requiring hourly monitoring of intake/output   CVC necessity: Yes  D: De-escalation Antibiotics   No    Family/Goals of care/Code Status   Code Status: Full Code    24H  Vital Sign Range  Temp:  [97.3 °F (36.3 °C)-99.3 °F (37.4 °C)]   Pulse:  [48-90]   Resp:  [20-33]   BP: ()/(50-70)   SpO2:  [94 %-100 %]      Shift Events   No acute events throughout shift. SBT/SAT failed. Patient to remain intubated and reevaluated. Currently on levo and propofol infusions. Vent settings unchanged.    VS and assessment per flow sheet, patient progressing towards goals as tolerated, plan of care reviewed with [unfilled], all concerns addressed.    Clare Swenson

## 2022-09-05 NOTE — ASSESSMENT & PLAN NOTE
Urology consulted for difficult ashby catheter placement in this critically ill patient     - 16 fr silicone catheter placed without issue using sterile technique, 350cc clear danny urine return    - ashby catheter per primary, recommend dc ashby when patient is extubated and alert

## 2022-09-05 NOTE — SUBJECTIVE & OBJECTIVE
Past Medical History:   Diagnosis Date    GERD (gastroesophageal reflux disease)     History of alcohol abuse        Past Surgical History:   Procedure Laterality Date    EGD, WITH BANDING OF VARICES         Review of patient's allergies indicates:   Allergen Reactions    Ampicillin     Ceclor [cefaclor]     Penicillins        Family History       Family history is unknown by patient.            Tobacco Use    Smoking status: Every Day     Types: Cigarettes    Smokeless tobacco: Never   Substance and Sexual Activity    Alcohol use: Yes     Comment: pint of vodka every day     Drug use: Yes     Types: Marijuana    Sexual activity: Not Currently       Review of Systems   Unable to perform ROS: Intubated     Objective:     Temp:  [95.2 °F (35.1 °C)-99.1 °F (37.3 °C)] 98.4 °F (36.9 °C)  Pulse:  [47-87] 60  Resp:  [20-22] 20  SpO2:  [95 %-100 %] 96 %  BP: ()/(50-70) 104/64     Body mass index is 21.53 kg/m².    Date 09/05/22 0700 - 09/06/22 0659   Shift 1413-7646 [id number] 0222-7210 24 Hour Total   INTAKE   I.V.(mL/kg) 48.1(0.7)   48.1(0.7)   IV Piggyback 52.5   52.5   Shift Total(mL/kg) 100.5(1.4)   100.5(1.4)   OUTPUT   Shift Total(mL/kg)       Weight (kg) 72 72 72 72     Bladder Scan Volume (mL): 200 mL (09/05/22 0409)    Drains       Drain  Duration                  NG/OG Tube Center mouth -- days         Chest Tube 09/03/22 Right Pleural 2 days                    Physical Exam  HENT:      Head: Normocephalic and atraumatic.   Pulmonary:      Comments: Intubated  Genitourinary:     Comments: Circumcised penis, condom catheter in place with no urine output, scrotal exam unremarkable   Neurological:      Comments: Sedated       Significant Labs:    BMP:  Recent Labs   Lab 09/03/22  2354 09/04/22  0338 09/05/22  0209   * 134* 137   K 3.5 3.4* 4.3    106 107   CO2 21* 21* 20*   BUN 14 13 25*   CREATININE 0.8 0.8 1.3   CALCIUM 8.0* 7.9* 7.9*       CBC:  Recent Labs   Lab 09/03/22  5538 09/04/22  0333  09/05/22  0209   WBC 8.32 10.06 14.19*   HGB 9.5* 10.0* 9.8*   HCT 28.7* 31.5* 30.6*   PLT 65* 75* 117*       All pertinent labs results from the past 24 hours have been reviewed.    Significant Imaging:  All pertinent imaging results/findings from the past 24 hours have been reviewed.

## 2022-09-05 NOTE — ASSESSMENT & PLAN NOTE
2/2 alcoholic cirrhosis. C/b variceal hemorrhage and possible HE. 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 Hepatology with reccs for medical stabilization prior to eval for possible transplant.    MELD-Na score: 23 at 9/5/2022  2:09 AM  MELD score: 23 at 9/5/2022  2:09 AM  Calculated from:  Serum Creatinine: 1.3 mg/dL at 9/5/2022  2:09 AM  Serum Sodium: 137 mmol/L at 9/5/2022  2:09 AM  Total Bilirubin: 7.1 mg/dL at 9/5/2022  2:09 AM  INR(ratio): 1.8 at 9/5/2022  2:09 AM  Age: 37 years    --OSH ammonia 87; continue lactulose - no BMs x 2 days - checking KUB prior to titrating lactulose  --f/u PETH  --holding home propranolol in setting of shock requiring vasopressors  --Hepatology consult, appreciate assistance

## 2022-09-05 NOTE — ASSESSMENT & PLAN NOTE
Patient with Hypoxic Respiratory failure which is Acute.  he is not on home oxygen. Supplemental oxygen was provided and noted- Vent Mode: A/C  Oxygen Concentration (%):  [40] 40  Resp Rate Total:  [20 br/min-26 br/min] 26 br/min  Vt Set:  [470 mL] 470 mL  PEEP/CPAP:  [5 cmH20] 5 cmH20  Mean Airway Pressure:  [8.3 cmH20-9.2 cmH20] 9 cmH20.   Signs/symptoms of respiratory failure include- tachypnea, increased work of breathing and respiratory distress. Contributing diagnoses includes - Pleural effusion, Pneumonia and pneumothorax Labs and images were reviewed. Patient Has not had a recent ABG. Will treat underlying causes and adjust management of respiratory failure as follows-   --Covid-19 positive (pt not vaccinated); concern for superimposed bacterial pna given leukocytosis and elevated procal  --chest CT with bilateral patchy infiltrates consistent with Covid-19  --right plural effusion (suspect hepatohydrothorax) + pneumothorax (unclear if procedural complication) s/p chest tube placement at OSH. CT imaging concerning for chest tube in lung parenchyma. CTS evaluation with recs for stabilization prior to any intervention  --continue dex + remdesivir; consider adding baricitinib on hospital day 2 if no improvement on dex/remdesivir  --continue Levaquin for suspected bacterial pna - abx broadened due to hypothermia  --minimal residual ptx on chest CT. Will place chest tube to water seal this am  --LPV; wean as able. Currently requiring peep 5, 40% fio2  --f/u blood and sputum cx; f/u results & tailor abx as apporpriate  --f/u pleural fluid studies

## 2022-09-05 NOTE — SUBJECTIVE & OBJECTIVE
Interval History/Significant Events: Euthermic and stable on decreasing pressor support. Intubated. Hawthorne placed by urology with clear urine output. Will trial SAT/SBT today. No BM x 2 days with lactulose - will check KUB.    Review of Systems   Unable to perform ROS: Intubated   Objective:     Vital Signs (Most Recent):  Temp: 98.8 °F (37.1 °C) (09/05/22 1101)  Pulse: 90 (09/05/22 1101)  Resp: (!) 33 (09/05/22 1101)  BP: 108/68 (09/05/22 1101)  SpO2: (!) 94 % (09/05/22 1101)   Vital Signs (24h Range):  Temp:  [95.2 °F (35.1 °C)-99.1 °F (37.3 °C)] 98.8 °F (37.1 °C)  Pulse:  [48-90] 90  Resp:  [20-33] 33  SpO2:  [94 %-100 %] 94 %  BP: ()/(50-70) 108/68   Weight: 72 kg (158 lb 11.7 oz)  Body mass index is 21.53 kg/m².      Intake/Output Summary (Last 24 hours) at 9/5/2022 1129  Last data filed at 9/5/2022 1101  Gross per 24 hour   Intake 1733.81 ml   Output 2154 ml   Net -420.19 ml       Physical Exam  Vitals and nursing note reviewed.   Constitutional:       Appearance: He is ill-appearing.   HENT:      Head: Normocephalic and atraumatic.      Right Ear: External ear normal.      Left Ear: External ear normal.      Nose: Nose normal.      Mouth/Throat:      Mouth: Mucous membranes are moist.      Pharynx: Oropharynx is clear.   Eyes:      General: Scleral icterus present.      Pupils: Pupils are equal, round, and reactive to light.   Cardiovascular:      Rate and Rhythm: Normal rate and regular rhythm.      Pulses: Normal pulses.      Heart sounds: Normal heart sounds. No murmur heard.  Pulmonary:      Comments: Mechanically ventilated. R surgical chest tube with serous drainage.   Abdominal:      General: Bowel sounds are normal. There is no distension.      Palpations: Abdomen is soft.      Tenderness: There is no abdominal tenderness.   Musculoskeletal:         General: No swelling or deformity.      Cervical back: Normal range of motion and neck supple.   Skin:     General: Skin is dry.      Capillary  Refill: Capillary refill takes less than 2 seconds.      Coloration: Skin is jaundiced.      Comments: Cool lower extremities   Neurological:      GCS: GCS eye subscore is 1. GCS verbal subscore is 1. GCS motor subscore is 4.      Comments: Chemically sedated       Vents:  Vent Mode: A/C (09/05/22 0823)  Set Rate: 20 BPM (09/05/22 0823)  Vt Set: 470 mL (09/05/22 0823)  PEEP/CPAP: 5 cmH20 (09/05/22 0823)  Oxygen Concentration (%): 40 (09/05/22 1101)  Peak Airway Pressure: 20 cmH2O (09/05/22 0823)  Plateau Pressure: 0 cmH20 (09/05/22 0823)  Total Ve: 9.69 mL (09/05/22 0823)  Negative Inspiratory Force (cm H2O): 0 (09/05/22 0823)  F/VT Ratio<105 (RSBI): (!) 41.32 (09/05/22 0823)  Lines/Drains/Airways       Central Venous Catheter Line  Duration             Percutaneous Central Line Insertion/Assessment - Triple Lumen  09/03/22 1500 right subclavian 1 day              Drain  Duration                  NG/OG Tube Center mouth -- days         Chest Tube 09/03/22 Right Pleural 2 days         Urethral Catheter 09/05/22 0900 <1 day              Airway  Duration                  Airway - Non-Surgical 09/03/22 1100 Endotracheal Tube 2 days              Peripheral Intravenous Line  Duration                  Peripheral IV - Single Lumen 09/03/22 20 G Posterior;Right Hand 2 days         Peripheral IV - Single Lumen 09/03/22 1500 20 G Left;Posterior Hand 1 day                  Significant Labs:    CBC/Anemia Profile:  Recent Labs   Lab 09/03/22 2354 09/03/22 2358 09/04/22 0338 09/05/22 0209   WBC  --  8.32 10.06 14.19*   HGB  --  9.5* 10.0* 9.8*   HCT  --  28.7* 31.5* 30.6*   PLT  --  65* 75* 117*   MCV  --  96 93 91   RDW  --  20.5* 20.0* 20.0*   FERRITIN 142  --   --   --           Chemistries:  Recent Labs   Lab 09/03/22 2354 09/04/22 0338 09/05/22  0209   * 134* 137   K 3.5 3.4* 4.3    106 107   CO2 21* 21* 20*   BUN 14 13 25*   CREATININE 0.8 0.8 1.3   CALCIUM 8.0* 7.9* 7.9*   ALBUMIN 1.8* 1.8* 1.7*   PROT  6.3 6.1 6.0   BILITOT 7.8* 8.2* 7.1*   ALKPHOS 104 111 106   ALT 37 34 32   * 79* 65*   MG 1.5* 1.8 1.8   PHOS 3.3 2.5* 3.7         All pertinent labs within the past 24 hours have been reviewed.    Significant Imaging:  I have reviewed all pertinent imaging results/findings within the past 24 hours.

## 2022-09-06 LAB
ALBUMIN SERPL BCP-MCNC: 1.6 G/DL (ref 3.5–5.2)
ALLENS TEST: ABNORMAL
ALP SERPL-CCNC: 95 U/L (ref 55–135)
ALT SERPL W/O P-5'-P-CCNC: 30 U/L (ref 10–44)
ANION GAP SERPL CALC-SCNC: 6 MMOL/L (ref 8–16)
AST SERPL-CCNC: 60 U/L (ref 10–40)
BACTERIA SPEC AEROBE CULT: NO GROWTH
BASOPHILS # BLD AUTO: 0.01 K/UL (ref 0–0.2)
BASOPHILS NFR BLD: 0.1 % (ref 0–1.9)
BILIRUB SERPL-MCNC: 6 MG/DL (ref 0.1–1)
BODY FLUID SOURCE, LDH: NORMAL
BUN SERPL-MCNC: 34 MG/DL (ref 6–20)
CALCIUM SERPL-MCNC: 7.4 MG/DL (ref 8.7–10.5)
CHLORIDE SERPL-SCNC: 108 MMOL/L (ref 95–110)
CO2 SERPL-SCNC: 21 MMOL/L (ref 23–29)
CREAT SERPL-MCNC: 1.3 MG/DL (ref 0.5–1.4)
DELSYS: ABNORMAL
DIFFERENTIAL METHOD: ABNORMAL
EOSINOPHIL # BLD AUTO: 0 K/UL (ref 0–0.5)
EOSINOPHIL NFR BLD: 0 % (ref 0–8)
ERYTHROCYTE [DISTWIDTH] IN BLOOD BY AUTOMATED COUNT: 20.1 % (ref 11.5–14.5)
ERYTHROCYTE [SEDIMENTATION RATE] IN BLOOD BY WESTERGREN METHOD: 20 MM/H
EST. GFR  (NO RACE VARIABLE): >60 ML/MIN/1.73 M^2
FIO2: 40
GLUCOSE FLD-MCNC: 128 MG/DL
GLUCOSE SERPL-MCNC: 136 MG/DL (ref 70–110)
GRAM STN SPEC: NORMAL
HCO3 UR-SCNC: 21.3 MMOL/L (ref 24–28)
HCT VFR BLD AUTO: 28.5 % (ref 40–54)
HGB BLD-MCNC: 9.1 G/DL (ref 14–18)
IMM GRANULOCYTES # BLD AUTO: 0.08 K/UL (ref 0–0.04)
IMM GRANULOCYTES NFR BLD AUTO: 0.7 % (ref 0–0.5)
INR PPP: 1.8 (ref 0.8–1.2)
LDH FLD L TO P-CCNC: 106 U/L
LYMPHOCYTES # BLD AUTO: 0.7 K/UL (ref 1–4.8)
LYMPHOCYTES NFR BLD: 6 % (ref 18–48)
MAGNESIUM SERPL-MCNC: 1.7 MG/DL (ref 1.6–2.6)
MCH RBC QN AUTO: 29.2 PG (ref 27–31)
MCHC RBC AUTO-ENTMCNC: 31.9 G/DL (ref 32–36)
MCV RBC AUTO: 91 FL (ref 82–98)
MODE: ABNORMAL
MONOCYTES # BLD AUTO: 0.3 K/UL (ref 0.3–1)
MONOCYTES NFR BLD: 2.9 % (ref 4–15)
NEUTROPHILS # BLD AUTO: 10 K/UL (ref 1.8–7.7)
NEUTROPHILS NFR BLD: 90.3 % (ref 38–73)
NRBC BLD-RTO: 0 /100 WBC
PCO2 BLDA: 28.8 MMHG (ref 35–45)
PEEP: 5
PH SMN: 7.48 [PH] (ref 7.35–7.45)
PHOSPHATE SERPL-MCNC: 4 MG/DL (ref 2.7–4.5)
PLATELET # BLD AUTO: 76 K/UL (ref 150–450)
PMV BLD AUTO: ABNORMAL FL (ref 9.2–12.9)
PO2 BLDA: 84 MMHG (ref 80–100)
POC BE: -2 MMOL/L
POC SATURATED O2: 97 % (ref 95–100)
POC TCO2: 22 MMOL/L (ref 23–27)
POTASSIUM SERPL-SCNC: 4.3 MMOL/L (ref 3.5–5.1)
PROT FLD-MCNC: <1 G/DL
PROT SERPL-MCNC: 5.6 G/DL (ref 6–8.4)
PROTHROMBIN TIME: 18.5 SEC (ref 9–12.5)
RBC # BLD AUTO: 3.12 M/UL (ref 4.6–6.2)
SAMPLE: ABNORMAL
SITE: ABNORMAL
SODIUM SERPL-SCNC: 135 MMOL/L (ref 136–145)
SPECIMEN SOURCE: NORMAL
SPECIMEN SOURCE: NORMAL
VANCOMYCIN SERPL-MCNC: 16.5 UG/ML
VT: 470
WBC # BLD AUTO: 11.09 K/UL (ref 3.9–12.7)

## 2022-09-06 PROCEDURE — 25000003 PHARM REV CODE 250: Performed by: STUDENT IN AN ORGANIZED HEALTH CARE EDUCATION/TRAINING PROGRAM

## 2022-09-06 PROCEDURE — C9113 INJ PANTOPRAZOLE SODIUM, VIA: HCPCS

## 2022-09-06 PROCEDURE — 63600175 PHARM REV CODE 636 W HCPCS: Performed by: STUDENT IN AN ORGANIZED HEALTH CARE EDUCATION/TRAINING PROGRAM

## 2022-09-06 PROCEDURE — 20000000 HC ICU ROOM

## 2022-09-06 PROCEDURE — 99291 PR CRITICAL CARE, E/M 30-74 MINUTES: ICD-10-PCS | Mod: ,,, | Performed by: STUDENT IN AN ORGANIZED HEALTH CARE EDUCATION/TRAINING PROGRAM

## 2022-09-06 PROCEDURE — 82803 BLOOD GASES ANY COMBINATION: CPT

## 2022-09-06 PROCEDURE — 99900035 HC TECH TIME PER 15 MIN (STAT)

## 2022-09-06 PROCEDURE — 25000003 PHARM REV CODE 250

## 2022-09-06 PROCEDURE — 27000221 HC OXYGEN, UP TO 24 HOURS

## 2022-09-06 PROCEDURE — 63600175 PHARM REV CODE 636 W HCPCS

## 2022-09-06 PROCEDURE — 99291 CRITICAL CARE FIRST HOUR: CPT | Mod: ,,, | Performed by: STUDENT IN AN ORGANIZED HEALTH CARE EDUCATION/TRAINING PROGRAM

## 2022-09-06 PROCEDURE — 94003 VENT MGMT INPAT SUBQ DAY: CPT

## 2022-09-06 PROCEDURE — 27200966 HC CLOSED SUCTION SYSTEM

## 2022-09-06 PROCEDURE — 25000003 PHARM REV CODE 250: Performed by: NURSE PRACTITIONER

## 2022-09-06 PROCEDURE — 80053 COMPREHEN METABOLIC PANEL: CPT | Performed by: NURSE PRACTITIONER

## 2022-09-06 PROCEDURE — 83735 ASSAY OF MAGNESIUM: CPT | Performed by: NURSE PRACTITIONER

## 2022-09-06 PROCEDURE — 99900026 HC AIRWAY MAINTENANCE (STAT)

## 2022-09-06 PROCEDURE — 94761 N-INVAS EAR/PLS OXIMETRY MLT: CPT

## 2022-09-06 PROCEDURE — 85025 COMPLETE CBC W/AUTO DIFF WBC: CPT | Performed by: NURSE PRACTITIONER

## 2022-09-06 PROCEDURE — 84100 ASSAY OF PHOSPHORUS: CPT | Performed by: NURSE PRACTITIONER

## 2022-09-06 PROCEDURE — 25000003 PHARM REV CODE 250: Performed by: INTERNAL MEDICINE

## 2022-09-06 PROCEDURE — 80202 ASSAY OF VANCOMYCIN: CPT | Performed by: NURSE PRACTITIONER

## 2022-09-06 PROCEDURE — 36600 WITHDRAWAL OF ARTERIAL BLOOD: CPT

## 2022-09-06 PROCEDURE — 85610 PROTHROMBIN TIME: CPT | Performed by: NURSE PRACTITIONER

## 2022-09-06 PROCEDURE — 63600175 PHARM REV CODE 636 W HCPCS: Performed by: INTERNAL MEDICINE

## 2022-09-06 PROCEDURE — 27000207 HC ISOLATION

## 2022-09-06 PROCEDURE — 63600175 PHARM REV CODE 636 W HCPCS: Performed by: NURSE PRACTITIONER

## 2022-09-06 RX ORDER — SODIUM CHLORIDE 9 MG/ML
INJECTION, SOLUTION INTRAVENOUS CONTINUOUS
Status: DISCONTINUED | OUTPATIENT
Start: 2022-09-06 | End: 2022-09-09

## 2022-09-06 RX ORDER — PANTOPRAZOLE SODIUM 40 MG/10ML
40 INJECTION, POWDER, LYOPHILIZED, FOR SOLUTION INTRAVENOUS DAILY
Status: DISCONTINUED | OUTPATIENT
Start: 2022-09-06 | End: 2022-09-08

## 2022-09-06 RX ORDER — MAGNESIUM SULFATE HEPTAHYDRATE 40 MG/ML
2 INJECTION, SOLUTION INTRAVENOUS ONCE
Status: COMPLETED | OUTPATIENT
Start: 2022-09-06 | End: 2022-09-06

## 2022-09-06 RX ADMIN — LACTULOSE 20 G: 20 SOLUTION ORAL at 08:09

## 2022-09-06 RX ADMIN — PIPERACILLIN SODIUM AND TAZOBACTAM SODIUM 4.5 G: 4; .5 INJECTION, POWDER, LYOPHILIZED, FOR SOLUTION INTRAVENOUS at 06:09

## 2022-09-06 RX ADMIN — DEXAMETHASONE SODIUM PHOSPHATE 6 MG: 4 INJECTION INTRA-ARTICULAR; INTRALESIONAL; INTRAMUSCULAR; INTRAVENOUS; SOFT TISSUE at 08:09

## 2022-09-06 RX ADMIN — ENOXAPARIN SODIUM 40 MG: 100 INJECTION SUBCUTANEOUS at 04:09

## 2022-09-06 RX ADMIN — SODIUM CHLORIDE: 0.9 INJECTION, SOLUTION INTRAVENOUS at 04:09

## 2022-09-06 RX ADMIN — PIPERACILLIN SODIUM AND TAZOBACTAM SODIUM 4.5 G: 4; .5 INJECTION, POWDER, LYOPHILIZED, FOR SOLUTION INTRAVENOUS at 10:09

## 2022-09-06 RX ADMIN — PANTOPRAZOLE SODIUM 40 MG: 40 GRANULE, DELAYED RELEASE ORAL at 08:09

## 2022-09-06 RX ADMIN — MUPIROCIN: 20 OINTMENT TOPICAL at 09:09

## 2022-09-06 RX ADMIN — REMDESIVIR 100 MG: 100 INJECTION, POWDER, LYOPHILIZED, FOR SOLUTION INTRAVENOUS at 09:09

## 2022-09-06 RX ADMIN — VANCOMYCIN HYDROCHLORIDE 750 MG: 750 INJECTION, POWDER, LYOPHILIZED, FOR SOLUTION INTRAVENOUS at 01:09

## 2022-09-06 RX ADMIN — MUPIROCIN: 20 OINTMENT TOPICAL at 08:09

## 2022-09-06 RX ADMIN — PROPOFOL 30 MCG/KG/MIN: 10 INJECTION, EMULSION INTRAVENOUS at 08:09

## 2022-09-06 RX ADMIN — PANTOPRAZOLE SODIUM 40 MG: 40 INJECTION, POWDER, FOR SOLUTION INTRAVENOUS at 11:09

## 2022-09-06 RX ADMIN — RIFAXIMIN 550 MG: 550 TABLET ORAL at 08:09

## 2022-09-06 RX ADMIN — Medication 100 MG: at 08:09

## 2022-09-06 RX ADMIN — FOLIC ACID 1 MG: 1 TABLET ORAL at 08:09

## 2022-09-06 RX ADMIN — MAGNESIUM SULFATE HEPTAHYDRATE 2 G: 40 INJECTION, SOLUTION INTRAVENOUS at 11:09

## 2022-09-06 RX ADMIN — CHLORHEXIDINE GLUCONATE 0.12% ORAL RINSE 15 ML: 1.2 LIQUID ORAL at 09:09

## 2022-09-06 RX ADMIN — SODIUM CHLORIDE: 9 INJECTION, SOLUTION INTRAVENOUS at 04:09

## 2022-09-06 RX ADMIN — PIPERACILLIN SODIUM AND TAZOBACTAM SODIUM 4.5 G: 4; .5 INJECTION, POWDER, LYOPHILIZED, FOR SOLUTION INTRAVENOUS at 02:09

## 2022-09-06 RX ADMIN — PROPOFOL 30 MCG/KG/MIN: 10 INJECTION, EMULSION INTRAVENOUS at 03:09

## 2022-09-06 NOTE — SUBJECTIVE & OBJECTIVE
Interval History/Significant Events: Euthermic and stable on decreasing pressor support. Intubated. Will trial SAT/SBT today. Holding lactulose for 3 BMs overnight. Chest tube remains with high output.     Review of Systems   Unable to perform ROS: Intubated   Objective:     Vital Signs (Most Recent):  Temp: 98.2 °F (36.8 °C) (09/06/22 1300)  Pulse: 98 (09/06/22 1300)  Resp: (!) 26 (09/06/22 1300)  BP: (!) 96/55 (09/06/22 1300)  SpO2: (!) 92 % (09/06/22 1300)   Vital Signs (24h Range):  Temp:  [96.1 °F (35.6 °C)-99.5 °F (37.5 °C)] 98.2 °F (36.8 °C)  Pulse:  [46-98] 98  Resp:  [17-42] 26  SpO2:  [92 %-100 %] 92 %  BP: ()/(53-70) 96/55   Weight: 72 kg (158 lb 11.7 oz)  Body mass index is 21.53 kg/m².      Intake/Output Summary (Last 24 hours) at 9/6/2022 1343  Last data filed at 9/6/2022 1300  Gross per 24 hour   Intake 1274.94 ml   Output 1775 ml   Net -500.06 ml         Physical Exam  Vitals and nursing note reviewed.   Constitutional:       Appearance: He is ill-appearing.   HENT:      Head: Normocephalic and atraumatic.      Right Ear: External ear normal.      Left Ear: External ear normal.      Nose: Nose normal.      Mouth/Throat:      Mouth: Mucous membranes are moist.      Pharynx: Oropharynx is clear.   Eyes:      General: Scleral icterus present.      Pupils: Pupils are equal, round, and reactive to light.   Cardiovascular:      Rate and Rhythm: Normal rate and regular rhythm.      Pulses: Normal pulses.      Heart sounds: Normal heart sounds. No murmur heard.  Pulmonary:      Comments: Mechanically ventilated. R surgical chest tube with serous drainage.   Abdominal:      General: Bowel sounds are normal. There is no distension.      Palpations: Abdomen is soft.      Tenderness: There is no abdominal tenderness.   Musculoskeletal:         General: No swelling or deformity.      Cervical back: Normal range of motion and neck supple.   Skin:     General: Skin is dry.      Capillary Refill: Capillary  refill takes less than 2 seconds.      Coloration: Skin is jaundiced.   Neurological:      Comments: Chemically sedated       Vents:  Vent Mode: A/C (09/06/22 1212)  Set Rate: 12 BPM (09/06/22 1212)  Vt Set: 470 mL (09/06/22 1212)  PEEP/CPAP: 5 cmH20 (09/06/22 1212)  Oxygen Concentration (%): 40 (09/06/22 1300)  Peak Airway Pressure: 21 cmH2O (09/06/22 1212)  Plateau Pressure: 16 cmH20 (09/06/22 1212)  Total Ve: 9.55 mL (09/06/22 1212)  Negative Inspiratory Force (cm H2O): 0 (09/06/22 1212)  F/VT Ratio<105 (RSBI): (!) 30.63 (09/06/22 1212)  Lines/Drains/Airways       Central Venous Catheter Line  Duration             Percutaneous Central Line Insertion/Assessment - Triple Lumen  09/03/22 1500 right subclavian 2 days              Drain  Duration                  NG/OG Tube Center mouth -- days         Chest Tube 09/03/22 Right Pleural 3 days         Urethral Catheter 09/05/22 0900 1 day              Airway  Duration                  Airway - Non-Surgical 09/03/22 1100 Endotracheal Tube 3 days              Peripheral Intravenous Line  Duration                  Peripheral IV - Single Lumen 09/03/22 1500 20 G Left;Posterior Hand 2 days                  Significant Labs:    CBC/Anemia Profile:  Recent Labs   Lab 09/05/22  0209 09/06/22  0300   WBC 14.19* 11.09   HGB 9.8* 9.1*   HCT 30.6* 28.5*   * 76*   MCV 91 91   RDW 20.0* 20.1*          Chemistries:  Recent Labs   Lab 09/05/22  0209 09/06/22  0300    135*   K 4.3 4.3    108   CO2 20* 21*   BUN 25* 34*   CREATININE 1.3 1.3   CALCIUM 7.9* 7.4*   ALBUMIN 1.7* 1.6*   PROT 6.0 5.6*   BILITOT 7.1* 6.0*   ALKPHOS 106 95   ALT 32 30   AST 65* 60*   MG 1.8 1.7   PHOS 3.7 4.0         All pertinent labs within the past 24 hours have been reviewed.    Significant Imaging:  I have reviewed all pertinent imaging results/findings within the past 24 hours.

## 2022-09-06 NOTE — NURSING
1330: SBT started. 10/5. FIO2 40% SPO2 94%.   1333: 5/5 SPO2 94%.FIO2 40%  1334: 5/5. SPO2 94%. FIO2 30%.  1338: 5/5. Increased to 8/5. SPO2 91%. FIO2 30%. Increased to 40%. SPO2 increased to 93%

## 2022-09-06 NOTE — PLAN OF CARE
CM[facility] DAILY GOALS       A: Awake    RASS: Goal -    Actual - RASS (Elizondo Agitation-Sedation Scale): -2-->light sedation   Restraint necessity: Clinical Justification: Treatment Interference  B: Breathe   SBT: Not attempted   C: Coordinate A & B, analgesics/sedatives   Pain: managed    SAT: Not attempted  D: Delirium   CAM-ICU: Overall CAM-ICU: Positive  E: Early(intubated/ Progressive (non-intubated) Mobility   MOVE Screen: Fail   Activity: Activity Management: Arm raise - L1  FAS: Feeding/Nutrition   Diet order: Diet/Nutrition Received: NPO,    T: Thrombus   DVT prophylaxis: VTE Required Core Measure: (SCDs) Sequential compression device initiated/maintained  H: HOB Elevation   Head of Bed (HOB) Positioning: HOB at 30 degrees  U: Ulcer Prophylaxis   GI: yes  G: Glucose control   managed    S: Skin   Bathing/Skin Care: bath, complete, dressed/undressed, incontinence care, linen changed  Device Skin Pressure Protection: absorbent pad utilized/changed, adhesive use limited, pressure points protected  Pressure Reduction Devices: specialty bed utilized, foam padding utilized  Pressure Reduction Techniques: weight shift assistance provided, pressure points protected  Skin Protection: adhesive use limited, incontinence pads utilized, transparent dressing maintained, tubing/devices free from skin contact  B: Bowel Function   diarrhea   I: Indwelling Catheters   Hawthorne necessity:      Urethral Catheter 09/05/22 0900-Reason for Continuing Urinary Catheterization: Placed by Urology Service  [REMOVED]      Urethral Catheter 09/04/22 0600 Coude 14 Fr.-Reason for Continuing Urinary Catheterization: Critically ill in ICU and requiring hourly monitoring of intake/output   CVC necessity: Yes  D: De-escalation Antibiotics   No    Family/Goals of care/Code Status   Code Status: Full Code    24H Vital Sign Range  Temp:  [97.9 °F (36.6 °C)-99.5 °F (37.5 °C)]   Pulse:  [49-90]   Resp:  [20-33]   BP: ()/(52-70)   SpO2:  [94 %-100  %]      Shift Events   No acute events throughout shift    VS and assessment per flow sheet, patient progressing towards goals as tolerated, plan of care reviewed with family, all concerns addressed, will continue to monitor.    Dorian Garcia

## 2022-09-06 NOTE — PLAN OF CARE
[facility] DAILY GOALS       A: Awake    RASS: Goal -    Actual - RASS (Elizondo Agitation-Sedation Scale): -2-->light sedation   Restraint necessity: Clinical Justification: Treatment Interference  B: Breathe   SBT: Pass   C: Coordinate A & B, analgesics/sedatives   Pain: managed    SAT: Pass  D: Delirium   CAM-ICU: Overall CAM-ICU: Positive  E: Early(intubated/ Progressive (non-intubated) Mobility   MOVE Screen: Fail   Activity: Activity Management:  (patient following commands)  FAS: Feeding/Nutrition   Diet order: Diet/Nutrition Received: NPO,    T: Thrombus   DVT prophylaxis: VTE Required Core Measure: Pharmacological prophylaxis initiated/maintained  H: HOB Elevation   Head of Bed (HOB) Positioning: HOB elevated  U: Ulcer Prophylaxis   GI: yes  G: Glucose control   managed    S: Skin   Bathing/Skin Care: back care, bath, partial, incontinence care  Device Skin Pressure Protection: absorbent pad utilized/changed, adhesive use limited  Pressure Reduction Devices: pressure-redistributing mattress utilized, specialty bed utilized  Pressure Reduction Techniques: weight shift assistance provided  Skin Protection: adhesive use limited, incontinence pads utilized  B: Bowel Function   diarrhea   I: Indwelling Catheters   Hawthorne necessity:      Urethral Catheter 09/05/22 0900-Reason for Continuing Urinary Catheterization: Placed by Urology Service  [REMOVED]      Urethral Catheter 09/04/22 0600 Coude 14 Fr.-Reason for Continuing Urinary Catheterization: Critically ill in ICU and requiring hourly monitoring of intake/output   CVC necessity: No  D: De-escalation Antibiotics   Yes    Family/Goals of care/Code Status   Code Status: Full Code    24H Vital Sign Range  Temp:  [96.1 °F (35.6 °C)-99.5 °F (37.5 °C)]   Pulse:  [46-99]   Resp:  [17-42]   BP: ()/(51-64)   SpO2:  [92 %-100 %]      Shift Events   SAT/SBT trialed. Patient on spontaneous. 8/5, FIO2 40%, satting 97%.  Chest tube still in place. Plan to extubated  tomorrow.     VS and assessment per flow sheet, patient progressing towards goals as tolerated, plan of care reviewed with  patient , all concerns addressed, will continue to monitor.    Lexii Taylor

## 2022-09-06 NOTE — PROGRESS NOTES
Pharmacokinetic Assessment Follow Up: IV Vancomycin    Vancomycin serum concentration assessment(s):    Vancomycin random level resulted at 16.5 mcg/mL, approximately 12 hours after the previous dose. Goal level is 10 to 20 mcg/mL.     Drug levels (last 3 results):  Recent Labs   Lab Result Units 09/05/22  1219 09/06/22  0300   Vancomycin, Random ug/mL 20.9 16.5     Vancomycin Regimen Plan:    Schedule vancomycin 750 mg IV every 12 hours starting at 9/6 1400 for lack of IV access. Next serum trough concentration measured on 9/7 1330.     Pharmacy will continue to follow and monitor vancomycin.    Please contact pharmacy at extension [phone] for questions regarding this assessment.    Thank you for the consult,   Rose Crandall, PharmD, BCCCP             Patient brief summary:  Porfirio Wilson is a 37 y.o. male initiated on antimicrobial therapy with IV vancomycin for treatment of sepsis    Drug Allergies:   Review of patient's allergies indicates:   Allergen Reactions    Ampicillin     Ceclor [cefaclor]     Penicillins        Actual Body Weight:   72 kg     Renal Function:   Estimated Creatinine Clearance: 80 mL/min (based on SCr of 1.3 mg/dL).    Dialysis Method (if applicable):  N/A    CBC (last 72 hours):  Recent Labs   Lab Result Units 09/03/22 2358 09/04/22  0338 09/05/22  0209 09/06/22  0300   WBC K/uL 8.32 10.06 14.19* 11.09   Hemoglobin g/dL 9.5* 10.0* 9.8* 9.1*   Hematocrit % 28.7* 31.5* 30.6* 28.5*   Platelets K/uL 65* 75* 117* 76*   Gran % % 78.4* 93.0* 89.5* 90.3*   Lymph % % 11.7* 4.1* 5.9* 6.0*   Mono % % 7.8 2.0* 3.8* 2.9*   Eosinophil % % 1.2 0.3 0.0 0.0   Basophil % % 0.4 0.1 0.1 0.1   Differential Method  Automated Automated Automated Automated       Metabolic Panel (last 72 hours):  Recent Labs   Lab Result Units 09/03/22  2354 09/04/22  0338 09/04/22  0605 09/05/22  0209 09/06/22  0300   Sodium mmol/L 134* 134*  --  137 135*   Potassium mmol/L 3.5 3.4*  --  4.3 4.3   Chloride mmol/L 105 106  --   107 108   CO2 mmol/L 21* 21*  --  20* 21*   Glucose mg/dL 99 141*  --  162* 136*   Glucose, UA   --   --  Negative  --   --    BUN mg/dL 14 13  --  25* 34*   Creatinine mg/dL 0.8 0.8  --  1.3 1.3   Albumin g/dL 1.8* 1.8*  --  1.7* 1.6*   Total Bilirubin mg/dL 7.8* 8.2*  --  7.1* 6.0*   Alkaline Phosphatase U/L 104 111  --  106 95   AST U/L 100* 79*  --  65* 60*   ALT U/L 37 34  --  32 30   Magnesium mg/dL 1.5* 1.8  --  1.8 1.7   Phosphorus mg/dL 3.3 2.5*  --  3.7 4.0       Vancomycin Administrations:  vancomycin given in the last 96 hours                     vancomycin 750 mg in dextrose 5 % 250 mL IVPB (ready to mix system) (mg) 750 mg New Bag 09/06/22 1359    vancomycin 750 mg in dextrose 5 % 250 mL IVPB (ready to mix system) (mg) 750 mg New Bag 09/05/22 1513    vancomycin in dextrose 5 % 1 gram/250 mL IVPB 1,000 mg (mg) 1,000 mg New Bag 09/05/22 0207    vancomycin 1.5 g in dextrose 5 % 250 mL IVPB (ready to mix) (mg) 1,500 mg New Bag 09/04/22 1712                    Microbiologic Results:  Microbiology Results (last 7 days)       Procedure Component Value Units Date/Time    Culture, Respiratory with Gram Stain [[id number]] Collected: 09/04/22 0029    Order Status: Completed Specimen: Sputum, Expectorated Updated: 09/06/22 0915     Respiratory Culture No growth     Gram Stain (Respiratory) <10 epithelial cells per low power field.     Gram Stain (Respiratory) Moderate WBC's     Gram Stain (Respiratory) No organisms seen    Blood culture (site 1) [[id number]] Collected: 09/04/22 0012    Order Status: Completed Specimen: Blood from Peripheral, Forearm, Left Updated: 09/06/22 0613     Blood Culture, Routine No Growth to date      No Growth to date      No Growth to date    Narrative:      Site # 1, aerobic and anaerobic    Blood culture (site 2) [[id number]] Collected: 09/03/22 0097    Order Status: Completed Specimen: Blood from Peripheral, Antecubital, Left Updated: 09/06/22 0613     Blood Culture, Routine No Growth  to date      No Growth to date      No Growth to date    Narrative:      Site # 2, aerobic only    Urine culture [[id number]] Collected: 09/04/22 0605    Order Status: Completed Specimen: Urine Updated: 09/05/22 1215     Urine Culture, Routine No growth    Narrative:      Specimen Source->Urine    AFB Culture & Smear [[id number]] Collected: 09/03/22 2359    Order Status: Completed Specimen: Pleural Fluid Updated: 09/05/22 0927     AFB Culture & Smear Culture in progress    Aerobic culture [[id number]] Collected: 09/03/22 2359    Order Status: Completed Specimen: Pleural Fluid Updated: 09/05/22 0729     Aerobic Bacterial Culture No growth    Culture, Anaerobic [[id number]] Collected: 09/03/22 2359    Order Status: Completed Specimen: Pleural Fluid Updated: 09/05/22 0546     Anaerobic Culture Culture in progress    Gram stain [[id number]] Collected: 09/03/22 2359    Order Status: Completed Specimen: Pleural Fluid Updated: 09/04/22 0937     Gram Stain Result No WBC's      No organisms seen

## 2022-09-06 NOTE — ASSESSMENT & PLAN NOTE
--~ 100cc blood from oropharynx night of transfer  --no blood from ETT and no blood from OGT  --? If pt bit tongue during above seizure like activity  --continue daily ppi; if concern develops for GIB will change to IV protonix bid & consult GI

## 2022-09-06 NOTE — ASSESSMENT & PLAN NOTE
2/2 alcoholic cirrhosis. C/b variceal hemorrhage and possible HE. 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 Hepatology with reccs for medical stabilization prior to eval for possible transplant.    MELD-Na score: 23 at 9/6/2022  3:00 AM  MELD score: 22 at 9/6/2022  3:00 AM  Calculated from:  Serum Creatinine: 1.3 mg/dL at 9/6/2022  3:00 AM  Serum Sodium: 135 mmol/L at 9/6/2022  3:00 AM  Total Bilirubin: 6.0 mg/dL at 9/6/2022  3:00 AM  INR(ratio): 1.8 at 9/6/2022  3:00 AM  Age: 37 years    --OSH ammonia 87; continue lactulose - no BMs x 2 days but with diarrhea ON - holding lactulose  --f/u PETH  --holding home propranolol in setting of shock requiring vasopressors  --Hepatology consult, appreciate assistance

## 2022-09-06 NOTE — PROGRESS NOTES
[facility] - Cardiac Medical ICU  Critical Care Medicine  Progress Note    Patient Name: Porfirio Wilson  MRN: [id number]  Admission Date: 9/3/2022  Hospital Length of Stay: 3 days  Code Status: Full Code  Attending Provider: Arthur Douglass MD  Primary Care Provider: No primary care provider on file.   Principal Problem: Acute hypoxemic respiratory failure    Subjective:     HPI:  Patient is a 36 y.o. male with significant past medical history of ETOH cirrhosis (complicated by presence of esophageal varices s/p band ligation), bipolar d/o and pancreatitis presented to [facility] on 9/3 complaining of shortness of breath x2 hours with associated fever/chills, cough and diaphoresis. Patient was intubated in the ED due to degree of respiratory distress. Cxry showed right pleural effusion and pneumothorax and focal consolidation on the left. He was found to be Covid positive. Patient had a right side chest tube placed. Labs at OSH significant for leukocytosis of 16, procal 0.46, lactic 5, , INR 2.1, negative troponin, ETOH negative and ammonia 86.  Pt was tx w/ levaquin & flagyl, as well as given IVF & vit K for coagulopathy associated w/ liver dz. Pt is currently requiring levo for map > 65. Last reported ETOH drink ~ 6 weeks ago.     Patient has been transferred to [facility] for higher level of care and Hepatology services.           Hospital/ICU Course:  Pt admitted to [facility] evening of 9/4 for acute hypoxemic respiratory failure and decompensated cirrhosis. Cultures repeated and pt maintained on mechanical ventilation/levo as needed for map > 65. Pt started on dex/remdesivir and continued on levaquin for suspected superimposed bacterial pna. CT chest shows multifocal pna, large right side pleural effusion. There is some concern that the chest tube placed at OSH may be in the lung parenchyma - evaluated by CTS who recommend medical stabilization before any intervention. Abx broadened due to  hypothermia. Improving with decreased need for pressors, though failing SBT.       Interval History/Significant Events: Euthermic and stable on decreasing pressor support. Intubated. Will trial SAT/SBT today. Holding lactulose for 3 BMs overnight. Chest tube remains with high output.     Review of Systems   Unable to perform ROS: Intubated   Objective:     Vital Signs (Most Recent):  Temp: 98.2 °F (36.8 °C) (09/06/22 1300)  Pulse: 98 (09/06/22 1300)  Resp: (!) 26 (09/06/22 1300)  BP: (!) 96/55 (09/06/22 1300)  SpO2: (!) 92 % (09/06/22 1300)   Vital Signs (24h Range):  Temp:  [96.1 °F (35.6 °C)-99.5 °F (37.5 °C)] 98.2 °F (36.8 °C)  Pulse:  [46-98] 98  Resp:  [17-42] 26  SpO2:  [92 %-100 %] 92 %  BP: ()/(53-70) 96/55   Weight: 72 kg (158 lb 11.7 oz)  Body mass index is 21.53 kg/m².      Intake/Output Summary (Last 24 hours) at 9/6/2022 1343  Last data filed at 9/6/2022 1300  Gross per 24 hour   Intake 1274.94 ml   Output 1775 ml   Net -500.06 ml         Physical Exam  Vitals and nursing note reviewed.   Constitutional:       Appearance: He is ill-appearing.   HENT:      Head: Normocephalic and atraumatic.      Right Ear: External ear normal.      Left Ear: External ear normal.      Nose: Nose normal.      Mouth/Throat:      Mouth: Mucous membranes are moist.      Pharynx: Oropharynx is clear.   Eyes:      General: Scleral icterus present.      Pupils: Pupils are equal, round, and reactive to light.   Cardiovascular:      Rate and Rhythm: Normal rate and regular rhythm.      Pulses: Normal pulses.      Heart sounds: Normal heart sounds. No murmur heard.  Pulmonary:      Comments: Mechanically ventilated. R surgical chest tube with serous drainage.   Abdominal:      General: Bowel sounds are normal. There is no distension.      Palpations: Abdomen is soft.      Tenderness: There is no abdominal tenderness.   Musculoskeletal:         General: No swelling or deformity.      Cervical back: Normal range of motion and  neck supple.   Skin:     General: Skin is dry.      Capillary Refill: Capillary refill takes less than 2 seconds.      Coloration: Skin is jaundiced.   Neurological:      Comments: Chemically sedated       Vents:  Vent Mode: A/C (09/06/22 1212)  Set Rate: 12 BPM (09/06/22 1212)  Vt Set: 470 mL (09/06/22 1212)  PEEP/CPAP: 5 cmH20 (09/06/22 1212)  Oxygen Concentration (%): 40 (09/06/22 1300)  Peak Airway Pressure: 21 cmH2O (09/06/22 1212)  Plateau Pressure: 16 cmH20 (09/06/22 1212)  Total Ve: 9.55 mL (09/06/22 1212)  Negative Inspiratory Force (cm H2O): 0 (09/06/22 1212)  F/VT Ratio<105 (RSBI): (!) 30.63 (09/06/22 1212)  Lines/Drains/Airways       Central Venous Catheter Line  Duration             Percutaneous Central Line Insertion/Assessment - Triple Lumen  09/03/22 1500 right subclavian 2 days              Drain  Duration                  NG/OG Tube Center mouth -- days         Chest Tube 09/03/22 Right Pleural 3 days         Urethral Catheter 09/05/22 0900 1 day              Airway  Duration                  Airway - Non-Surgical 09/03/22 1100 Endotracheal Tube 3 days              Peripheral Intravenous Line  Duration                  Peripheral IV - Single Lumen 09/03/22 1500 20 G Left;Posterior Hand 2 days                  Significant Labs:    CBC/Anemia Profile:  Recent Labs   Lab 09/05/22  0209 09/06/22  0300   WBC 14.19* 11.09   HGB 9.8* 9.1*   HCT 30.6* 28.5*   * 76*   MCV 91 91   RDW 20.0* 20.1*          Chemistries:  Recent Labs   Lab 09/05/22  0209 09/06/22  0300    135*   K 4.3 4.3    108   CO2 20* 21*   BUN 25* 34*   CREATININE 1.3 1.3   CALCIUM 7.9* 7.4*   ALBUMIN 1.7* 1.6*   PROT 6.0 5.6*   BILITOT 7.1* 6.0*   ALKPHOS 106 95   ALT 32 30   AST 65* 60*   MG 1.8 1.7   PHOS 3.7 4.0         All pertinent labs within the past 24 hours have been reviewed.    Significant Imaging:  I have reviewed all pertinent imaging results/findings within the past 24 hours.      ABG  Recent Labs   Lab  09/06/22  0822   PH 7.477*   PO2 84   PCO2 28.8*   HCO3 21.3*   BE -2     Assessment/Plan:     Neuro  Seizure  --reported to have full tonic-clonic seizure like activity lasting ~ 1 min and resolved spontaneously at onset of transfer from OSH per EMS  --head CT negative for acute intracranial abnormality   --if occurs again, will obtain EEG    ENT  Oropharyngeal bleeding  --~ 100cc blood from oropharynx night of transfer  --no blood from ETT and no blood from OGT  --? If pt bit tongue during above seizure like activity  --continue daily ppi; if concern develops for GIB will change to IV protonix bid & consult GI       Pulmonary  * Acute hypoxemic respiratory failure  Patient with Hypoxic Respiratory failure which is Acute.  he is not on home oxygen. Supplemental oxygen was provided and noted- Vent Mode: A/C  Oxygen Concentration (%):  [40] 40  Resp Rate Total:  [20 br/min-26 br/min] 26 br/min  Vt Set:  [470 mL] 470 mL  PEEP/CPAP:  [5 cmH20] 5 cmH20  Mean Airway Pressure:  [8 cmH20-9.5 cmH20] 8 cmH20.   Signs/symptoms of respiratory failure include- tachypnea, increased work of breathing and respiratory distress. Contributing diagnoses includes - Pleural effusion, Pneumonia and pneumothorax Labs and images were reviewed. Patient Has not had a recent ABG. Will treat underlying causes and adjust management of respiratory failure as follows-   --Covid-19 positive (pt not vaccinated); concern for superimposed bacterial pna given leukocytosis and elevated procal  --chest CT with bilateral patchy infiltrates consistent with Covid-19  --right plural effusion (suspect hepatohydrothorax) + pneumothorax (unclear if procedural complication) s/p chest tube placement at OSH. CT imaging concerning for chest tube in lung parenchyma vs lung re-expansion around tube. CTS evaluation with recs for stabilization prior to any intervention  --continue dex + remdesivir  --minimal residual ptx on chest CT.   --f/u blood and sputum cx; f/u  results & tailor abx as appropriate - likely DC vanc tomorrow with negative cultures  --f/u pleural fluid studies - will add on LDH, glucose, protein    Renal/  Urinary retention  S/p ashby placement 9/5 with clear urine output.     GI  Decompensated hepatic cirrhosis  2/2 alcoholic cirrhosis. C/b variceal hemorrhage and possible HE. 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 Hepatology with reccs for medical stabilization prior to eval for possible transplant.    MELD-Na score: 23 at 9/6/2022  3:00 AM  MELD score: 22 at 9/6/2022  3:00 AM  Calculated from:  Serum Creatinine: 1.3 mg/dL at 9/6/2022  3:00 AM  Serum Sodium: 135 mmol/L at 9/6/2022  3:00 AM  Total Bilirubin: 6.0 mg/dL at 9/6/2022  3:00 AM  INR(ratio): 1.8 at 9/6/2022  3:00 AM  Age: 37 years    --OSH ammonia 87; continue lactulose - no BMs x 2 days but with diarrhea ON - holding lactulose  --f/u PETH  --holding home propranolol in setting of shock requiring vasopressors  --Hepatology consult, appreciate assistance       Critical Care Daily Checklist:    A: Awake: RASS Goal/Actual Goal:    Actual: Elizondo Agitation Sedation Scale (RASS): Light sedation   B: Spontaneous Breathing Trial Performed?  Yes   C: SAT & SBT Coordinated?  Yes                      D: Delirium: CAM-ICU Overall CAM-ICU: Positive   E: Early Mobility Performed? Yes   F: Feeding Goal:    Status:     Current Diet Order   No orders of the defined types were placed in this encounter.      AS: Analgesia/Sedation Propofol   T: Thromboembolic Prophylaxis lovenox   H: HOB > 300 Yes   U: Stress Ulcer Prophylaxis (if needed) PPI   G: Glucose Control 140-180   B: Bowel Function Stool Occurrence: 1   I: Indwelling Catheter (Lines & Ashby) Necessity Asbhy, PIV, R subclavian CVC, chest tube   D: De-escalation of Antimicrobials/Pharmacotherapies Will dc vanc tomorrow with negative cultures    Plan for the day/ETD SBT    Code Status:  Family/Goals of Care: Full Code  Continue care        Critical secondary to Patient has a condition that poses threat to life and bodily function: Severe Respiratory Distress      Critical care was time spent personally by me on the following activities: development of treatment plan with patient or surrogate and bedside caregivers, discussions with consultants, evaluation of patient's response to treatment, examination of patient, ordering and performing treatments and interventions, ordering and review of laboratory studies, ordering and review of radiographic studies, pulse oximetry, re-evaluation of patient's condition. This critical care time did not overlap with that of any other provider or involve time for any procedures.     Abdiaziz Garay MD  Critical Care Medicine  [facility] - Cardiac Medical ICU

## 2022-09-06 NOTE — ASSESSMENT & PLAN NOTE
Patient with Hypoxic Respiratory failure which is Acute.  he is not on home oxygen. Supplemental oxygen was provided and noted- Vent Mode: A/C  Oxygen Concentration (%):  [40] 40  Resp Rate Total:  [20 br/min-26 br/min] 26 br/min  Vt Set:  [470 mL] 470 mL  PEEP/CPAP:  [5 cmH20] 5 cmH20  Mean Airway Pressure:  [8 cmH20-9.5 cmH20] 8 cmH20.   Signs/symptoms of respiratory failure include- tachypnea, increased work of breathing and respiratory distress. Contributing diagnoses includes - Pleural effusion, Pneumonia and pneumothorax Labs and images were reviewed. Patient Has not had a recent ABG. Will treat underlying causes and adjust management of respiratory failure as follows-   --Covid-19 positive (pt not vaccinated); concern for superimposed bacterial pna given leukocytosis and elevated procal  --chest CT with bilateral patchy infiltrates consistent with Covid-19  --right plural effusion (suspect hepatohydrothorax) + pneumothorax (unclear if procedural complication) s/p chest tube placement at OSH. CT imaging concerning for chest tube in lung parenchyma vs lung re-expansion around tube. CTS evaluation with recs for stabilization prior to any intervention  --continue dex + remdesivir  --minimal residual ptx on chest CT.   --f/u blood and sputum cx; f/u results & tailor abx as appropriate - likely DC vanc tomorrow with negative cultures  --f/u pleural fluid studies - will add on LDH, glucose, protein

## 2022-09-07 LAB
ALBUMIN SERPL BCP-MCNC: 1.7 G/DL (ref 3.5–5.2)
ALLENS TEST: ABNORMAL
ALP SERPL-CCNC: 92 U/L (ref 55–135)
ALT SERPL W/O P-5'-P-CCNC: 33 U/L (ref 10–44)
ANION GAP SERPL CALC-SCNC: 6 MMOL/L (ref 8–16)
AST SERPL-CCNC: 64 U/L (ref 10–40)
BACTERIA SPEC AEROBE CULT: NO GROWTH
BASOPHILS # BLD AUTO: 0.01 K/UL (ref 0–0.2)
BASOPHILS NFR BLD: 0.1 % (ref 0–1.9)
BILIRUB SERPL-MCNC: 6.3 MG/DL (ref 0.1–1)
BUN SERPL-MCNC: 40 MG/DL (ref 6–20)
CALCIUM SERPL-MCNC: 7.4 MG/DL (ref 8.7–10.5)
CHLORIDE SERPL-SCNC: 109 MMOL/L (ref 95–110)
CO2 SERPL-SCNC: 20 MMOL/L (ref 23–29)
CREAT SERPL-MCNC: 1 MG/DL (ref 0.5–1.4)
DELSYS: ABNORMAL
DIFFERENTIAL METHOD: ABNORMAL
EOSINOPHIL # BLD AUTO: 0 K/UL (ref 0–0.5)
EOSINOPHIL NFR BLD: 0 % (ref 0–8)
ERYTHROCYTE [DISTWIDTH] IN BLOOD BY AUTOMATED COUNT: 19.7 % (ref 11.5–14.5)
EST. GFR  (NO RACE VARIABLE): >60 ML/MIN/1.73 M^2
FIO2: 40
GLUCOSE SERPL-MCNC: 148 MG/DL (ref 70–110)
HCO3 UR-SCNC: 20.8 MMOL/L (ref 24–28)
HCT VFR BLD AUTO: 30.5 % (ref 40–54)
HGB BLD-MCNC: 9.7 G/DL (ref 14–18)
IMM GRANULOCYTES # BLD AUTO: 0.04 K/UL (ref 0–0.04)
IMM GRANULOCYTES NFR BLD AUTO: 0.4 % (ref 0–0.5)
INR PPP: 1.9 (ref 0.8–1.2)
LYMPHOCYTES # BLD AUTO: 0.7 K/UL (ref 1–4.8)
LYMPHOCYTES NFR BLD: 6.7 % (ref 18–48)
MAGNESIUM SERPL-MCNC: 2.2 MG/DL (ref 1.6–2.6)
MCH RBC QN AUTO: 28.4 PG (ref 27–31)
MCHC RBC AUTO-ENTMCNC: 31.8 G/DL (ref 32–36)
MCV RBC AUTO: 89 FL (ref 82–98)
MODE: ABNORMAL
MONOCYTES # BLD AUTO: 0.5 K/UL (ref 0.3–1)
MONOCYTES NFR BLD: 4.2 % (ref 4–15)
NEUTROPHILS # BLD AUTO: 9.8 K/UL (ref 1.8–7.7)
NEUTROPHILS NFR BLD: 88.6 % (ref 38–73)
NRBC BLD-RTO: 0 /100 WBC
PCO2 BLDA: 32.7 MMHG (ref 35–45)
PEEP: 5
PH SMN: 7.41 [PH] (ref 7.35–7.45)
PHOSPHATE SERPL-MCNC: 3.3 MG/DL (ref 2.7–4.5)
PLATELET # BLD AUTO: 68 K/UL (ref 150–450)
PMV BLD AUTO: ABNORMAL FL (ref 9.2–12.9)
PO2 BLDA: 44 MMHG (ref 80–100)
POC BE: -4 MMOL/L
POC SATURATED O2: 81 % (ref 95–100)
POC TCO2: 22 MMOL/L (ref 23–27)
POTASSIUM SERPL-SCNC: 4 MMOL/L (ref 3.5–5.1)
PROT SERPL-MCNC: 5.9 G/DL (ref 6–8.4)
PROTHROMBIN TIME: 19.2 SEC (ref 9–12.5)
RBC # BLD AUTO: 3.41 M/UL (ref 4.6–6.2)
SAMPLE: ABNORMAL
SITE: ABNORMAL
SODIUM SERPL-SCNC: 135 MMOL/L (ref 136–145)
VT: 470
WBC # BLD AUTO: 11.08 K/UL (ref 3.9–12.7)

## 2022-09-07 PROCEDURE — 99900026 HC AIRWAY MAINTENANCE (STAT)

## 2022-09-07 PROCEDURE — 99900035 HC TECH TIME PER 15 MIN (STAT)

## 2022-09-07 PROCEDURE — P9047 ALBUMIN (HUMAN), 25%, 50ML: HCPCS | Mod: JG | Performed by: STUDENT IN AN ORGANIZED HEALTH CARE EDUCATION/TRAINING PROGRAM

## 2022-09-07 PROCEDURE — 63600175 PHARM REV CODE 636 W HCPCS

## 2022-09-07 PROCEDURE — 25000003 PHARM REV CODE 250: Performed by: INTERNAL MEDICINE

## 2022-09-07 PROCEDURE — 63600175 PHARM REV CODE 636 W HCPCS: Performed by: STUDENT IN AN ORGANIZED HEALTH CARE EDUCATION/TRAINING PROGRAM

## 2022-09-07 PROCEDURE — 25000003 PHARM REV CODE 250: Performed by: STUDENT IN AN ORGANIZED HEALTH CARE EDUCATION/TRAINING PROGRAM

## 2022-09-07 PROCEDURE — 85610 PROTHROMBIN TIME: CPT | Performed by: NURSE PRACTITIONER

## 2022-09-07 PROCEDURE — 20000000 HC ICU ROOM

## 2022-09-07 PROCEDURE — 83735 ASSAY OF MAGNESIUM: CPT | Performed by: NURSE PRACTITIONER

## 2022-09-07 PROCEDURE — 25000003 PHARM REV CODE 250

## 2022-09-07 PROCEDURE — 27000221 HC OXYGEN, UP TO 24 HOURS

## 2022-09-07 PROCEDURE — 84100 ASSAY OF PHOSPHORUS: CPT | Performed by: NURSE PRACTITIONER

## 2022-09-07 PROCEDURE — 94003 VENT MGMT INPAT SUBQ DAY: CPT

## 2022-09-07 PROCEDURE — 25000003 PHARM REV CODE 250: Performed by: NURSE PRACTITIONER

## 2022-09-07 PROCEDURE — C9113 INJ PANTOPRAZOLE SODIUM, VIA: HCPCS

## 2022-09-07 PROCEDURE — 63600175 PHARM REV CODE 636 W HCPCS: Performed by: NURSE PRACTITIONER

## 2022-09-07 PROCEDURE — 94150 VITAL CAPACITY TEST: CPT

## 2022-09-07 PROCEDURE — 80053 COMPREHEN METABOLIC PANEL: CPT | Performed by: NURSE PRACTITIONER

## 2022-09-07 PROCEDURE — 85025 COMPLETE CBC W/AUTO DIFF WBC: CPT | Performed by: NURSE PRACTITIONER

## 2022-09-07 PROCEDURE — 99291 CRITICAL CARE FIRST HOUR: CPT | Mod: ,,, | Performed by: STUDENT IN AN ORGANIZED HEALTH CARE EDUCATION/TRAINING PROGRAM

## 2022-09-07 PROCEDURE — 94761 N-INVAS EAR/PLS OXIMETRY MLT: CPT

## 2022-09-07 PROCEDURE — 99291 PR CRITICAL CARE, E/M 30-74 MINUTES: ICD-10-PCS | Mod: ,,, | Performed by: STUDENT IN AN ORGANIZED HEALTH CARE EDUCATION/TRAINING PROGRAM

## 2022-09-07 PROCEDURE — 94010 BREATHING CAPACITY TEST: CPT

## 2022-09-07 PROCEDURE — 82803 BLOOD GASES ANY COMBINATION: CPT

## 2022-09-07 PROCEDURE — 63600175 PHARM REV CODE 636 W HCPCS: Performed by: INTERNAL MEDICINE

## 2022-09-07 PROCEDURE — 87449 NOS EACH ORGANISM AG IA: CPT

## 2022-09-07 PROCEDURE — 27000207 HC ISOLATION

## 2022-09-07 PROCEDURE — 27200966 HC CLOSED SUCTION SYSTEM

## 2022-09-07 RX ORDER — FOLIC ACID 1 MG/1
1 TABLET ORAL DAILY
Status: DISCONTINUED | OUTPATIENT
Start: 2022-09-08 | End: 2022-09-18 | Stop reason: HOSPADM

## 2022-09-07 RX ORDER — FUROSEMIDE 10 MG/ML
40 INJECTION INTRAMUSCULAR; INTRAVENOUS DAILY
Status: DISCONTINUED | OUTPATIENT
Start: 2022-09-07 | End: 2022-09-11

## 2022-09-07 RX ORDER — ALBUMIN HUMAN 250 G/1000ML
37.5 SOLUTION INTRAVENOUS ONCE
Status: COMPLETED | OUTPATIENT
Start: 2022-09-07 | End: 2022-09-07

## 2022-09-07 RX ORDER — THIAMINE HCL 100 MG
100 TABLET ORAL DAILY
Status: DISCONTINUED | OUTPATIENT
Start: 2022-09-08 | End: 2022-09-18 | Stop reason: HOSPADM

## 2022-09-07 RX ORDER — LACTULOSE 10 G/15ML
20 SOLUTION ORAL 3 TIMES DAILY
Status: DISCONTINUED | OUTPATIENT
Start: 2022-09-08 | End: 2022-09-18 | Stop reason: HOSPADM

## 2022-09-07 RX ORDER — SPIRONOLACTONE 100 MG/1
100 TABLET, FILM COATED ORAL DAILY
Status: DISCONTINUED | OUTPATIENT
Start: 2022-09-07 | End: 2022-09-15

## 2022-09-07 RX ORDER — ACETAMINOPHEN 325 MG/1
650 TABLET ORAL EVERY 4 HOURS PRN
Status: DISCONTINUED | OUTPATIENT
Start: 2022-09-07 | End: 2022-09-18 | Stop reason: HOSPADM

## 2022-09-07 RX ADMIN — PANTOPRAZOLE SODIUM 40 MG: 40 INJECTION, POWDER, FOR SOLUTION INTRAVENOUS at 08:09

## 2022-09-07 RX ADMIN — FOLIC ACID 1 MG: 1 TABLET ORAL at 08:09

## 2022-09-07 RX ADMIN — PIPERACILLIN SODIUM AND TAZOBACTAM SODIUM 4.5 G: 4; .5 INJECTION, POWDER, LYOPHILIZED, FOR SOLUTION INTRAVENOUS at 12:09

## 2022-09-07 RX ADMIN — FUROSEMIDE 40 MG: 10 INJECTION, SOLUTION INTRAMUSCULAR; INTRAVENOUS at 12:09

## 2022-09-07 RX ADMIN — LACTULOSE 20 G: 20 SOLUTION ORAL at 08:09

## 2022-09-07 RX ADMIN — PIPERACILLIN SODIUM AND TAZOBACTAM SODIUM 4.5 G: 4; .5 INJECTION, POWDER, LYOPHILIZED, FOR SOLUTION INTRAVENOUS at 07:09

## 2022-09-07 RX ADMIN — PIPERACILLIN SODIUM AND TAZOBACTAM SODIUM 4.5 G: 4; .5 INJECTION, POWDER, LYOPHILIZED, FOR SOLUTION INTRAVENOUS at 03:09

## 2022-09-07 RX ADMIN — RIFAXIMIN 550 MG: 550 TABLET ORAL at 08:09

## 2022-09-07 RX ADMIN — SPIRONOLACTONE 100 MG: 100 TABLET ORAL at 01:09

## 2022-09-07 RX ADMIN — MUPIROCIN: 20 OINTMENT TOPICAL at 08:09

## 2022-09-07 RX ADMIN — VANCOMYCIN HYDROCHLORIDE 750 MG: 750 INJECTION, POWDER, LYOPHILIZED, FOR SOLUTION INTRAVENOUS at 01:09

## 2022-09-07 RX ADMIN — Medication 100 MG: at 08:09

## 2022-09-07 RX ADMIN — ENOXAPARIN SODIUM 40 MG: 100 INJECTION SUBCUTANEOUS at 04:09

## 2022-09-07 RX ADMIN — DEXAMETHASONE SODIUM PHOSPHATE 6 MG: 4 INJECTION INTRA-ARTICULAR; INTRALESIONAL; INTRAMUSCULAR; INTRAVENOUS; SOFT TISSUE at 08:09

## 2022-09-07 RX ADMIN — NOREPINEPHRINE BITARTRATE 0.02 MCG/KG/MIN: 8 INJECTION, SOLUTION INTRAVENOUS at 02:09

## 2022-09-07 RX ADMIN — REMDESIVIR 100 MG: 100 INJECTION, POWDER, LYOPHILIZED, FOR SOLUTION INTRAVENOUS at 08:09

## 2022-09-07 RX ADMIN — ALBUMIN HUMAN 37.5 G: 0.25 SOLUTION INTRAVENOUS at 12:09

## 2022-09-07 NOTE — SUBJECTIVE & OBJECTIVE
Interval History/Significant Events: Hypothermic to 95 again overnight. Continued diarrhea - holding lactulose. Doing well on SBT with plans to extubate.     Review of Systems   Unable to perform ROS: Intubated   Objective:     Vital Signs (Most Recent):  Temp: 96.6 °F (35.9 °C) (09/07/22 1100)  Pulse: (!) 48 (09/07/22 1300)  Resp: (!) 24 (09/07/22 1300)  BP: 125/67 (09/07/22 1328)  SpO2: 100 % (09/07/22 1300)   Vital Signs (24h Range):  Temp:  [95.9 °F (35.5 °C)-99.1 °F (37.3 °C)] 96.6 °F (35.9 °C)  Pulse:  [40-99] 48  Resp:  [15-39] 24  SpO2:  [92 %-100 %] 100 %  BP: ()/(51-68) 125/67   Weight: 73.2 kg (161 lb 6 oz)  Body mass index is 21.89 kg/m².      Intake/Output Summary (Last 24 hours) at 9/7/2022 1346  Last data filed at 9/7/2022 0600  Gross per 24 hour   Intake 324.57 ml   Output 2550 ml   Net -2225.43 ml         Physical Exam  Vitals and nursing note reviewed.   Constitutional:       Appearance: He is ill-appearing.   HENT:      Head: Normocephalic and atraumatic.      Right Ear: External ear normal.      Left Ear: External ear normal.      Nose: Nose normal.      Mouth/Throat:      Mouth: Mucous membranes are moist.      Pharynx: Oropharynx is clear.   Eyes:      General: Scleral icterus present.      Pupils: Pupils are equal, round, and reactive to light.   Cardiovascular:      Rate and Rhythm: Normal rate and regular rhythm.      Pulses: Normal pulses.      Heart sounds: Normal heart sounds. No murmur heard.  Pulmonary:      Comments: Mechanically ventilated. R surgical chest tube with serous drainage.   Abdominal:      General: Bowel sounds are normal. There is no distension.      Palpations: Abdomen is soft.      Tenderness: There is no abdominal tenderness.   Musculoskeletal:         General: No swelling or deformity.      Cervical back: Normal range of motion and neck supple.   Skin:     General: Skin is dry.      Capillary Refill: Capillary refill takes less than 2 seconds.      Coloration:  Skin is jaundiced.   Neurological:      Comments: Chemically sedated       Vents:  Vent Mode: Spont (09/07/22 0930)  Set Rate: 12 BPM (09/07/22 0300)  Vt Set: 470 mL (09/07/22 0300)  Pressure Support: 8 cmH20 (09/07/22 0930)  PEEP/CPAP: 5 cmH20 (09/07/22 0930)  Oxygen Concentration (%): 40 (09/07/22 0930)  Peak Airway Pressure: 13 cmH2O (09/07/22 0930)  Plateau Pressure: 16 cmH20 (09/07/22 0930)  Total Ve: 4.65 mL (09/07/22 0930)  Negative Inspiratory Force (cm H2O): -21 (09/07/22 0930)  F/VT Ratio<105 (RSBI): (!) 61.54 (09/07/22 0735)  Lines/Drains/Airways       Central Venous Catheter Line  Duration             Percutaneous Central Line Insertion/Assessment - Triple Lumen  09/03/22 1500 right subclavian 3 days              Drain  Duration                  NG/OG Tube Center mouth -- days         Chest Tube 09/03/22 Right Pleural 4 days         Urethral Catheter 09/05/22 0900 2 days              Peripheral Intravenous Line  Duration                  Peripheral IV - Single Lumen 09/03/22 1500 20 G Left;Posterior Hand 3 days                  Significant Labs:    CBC/Anemia Profile:  Recent Labs   Lab 09/06/22  0300 09/07/22  0400   WBC 11.09 11.08   HGB 9.1* 9.7*   HCT 28.5* 30.5*   PLT 76* 68*   MCV 91 89   RDW 20.1* 19.7*          Chemistries:  Recent Labs   Lab 09/06/22  0300 09/07/22  0400   * 135*   K 4.3 4.0    109   CO2 21* 20*   BUN 34* 40*   CREATININE 1.3 1.0   CALCIUM 7.4* 7.4*   ALBUMIN 1.6* 1.7*   PROT 5.6* 5.9*   BILITOT 6.0* 6.3*   ALKPHOS 95 92   ALT 30 33   AST 60* 64*   MG 1.7 2.2   PHOS 4.0 3.3         All pertinent labs within the past 24 hours have been reviewed.    Significant Imaging:  I have reviewed all pertinent imaging results/findings within the past 24 hours.

## 2022-09-07 NOTE — PROGRESS NOTES
Pharmacokinetic Assessment Follow Up: IV Vancomycin    Therapy with vancomycin discontinued by provider. Pharmacy will sign off, please re-consult as needed.    Rose Crandall, PharmD, BCCCP  [id number]

## 2022-09-07 NOTE — NURSING
PATIENT BRADYCARDIC, HR SUSTAINED 36-41. RADIAL PULSE PALPABLE, PT OPENS EYES TO VOICE. SEE FLOW SHEET FOR BP. MD INFORMED OF SUSTAINED BRADYCARDIA AND IS AT BEDSIDE. PROPOFOL ON HOLD. NO NEW ORDERS.

## 2022-09-07 NOTE — PLAN OF CARE
[facility] - Cardiac Medical ICU  Initial Discharge Assessment       Primary Care Provider: No primary care provider on file.    Admission Diagnosis: Acute hypoxemic respiratory failure [J96.01]    Admission Date: 9/3/2022  Expected Discharge Date: 9/12/2022    Discharge Barriers Identified: Underinsured    Payor: MEDICAID / Plan: [place] [employer] [place] / Product Type: Managed Medicaid /     Extended Emergency Contact Information  Primary Emergency Contact: Gloria Lenz  Mobile Phone: [phone]  Relation: Mother  Secondary Emergency Contact: harinder lenz  Mobile Phone: [phone]  Relation: Sister    Discharge Plan A: Home with family  Discharge Plan B: Home      [id number] - [place], [place] - [address] AT [facility]  [address]  [place] [address]  Phone: [phone] Fax: [phone]      Transferred from:     Past Medical History:   Diagnosis Date    GERD (gastroesophageal reflux disease)     History of alcohol abuse          CM spoke with Gloria Lenz (mother) [phone] via phone for Discharge Planning Assessment.  Patient was unable to answer questions due being intubated on ventilator.  Per mother, patient lives with her in a 1-story home with 1 step(s) to enter.   Per mother, patient was independent with ADLS and used no DME for ambulation.  Per mother, patient is not on dialysis and does not take Coumadin.  Patient will need transportation home upon discharge.   Discharge Planning discussed with Gloria Lenz (mother) [phone] via phone.  All questions addressed.  CM will follow for needs.      Initial Assessment (most recent)       Adult Discharge Assessment - 09/07/22 1139          Discharge Assessment    Assessment Type Discharge Planning Assessment     Confirmed/corrected address, phone number and insurance Yes     Confirmed Demographics Correct on Facesheet     Source of Information family     When was your last doctors appointment? --   unknown     Communicated ENIO with patient/caregiver Date not available/Unable to determine     Reason For Admission Acute hypoxemic respiratory failure     Lives With parent(s)     Facility Arrived From: [facility]     Do you expect to return to your current living situation? Yes     Do you have help at home or someone to help you manage your care at home? Yes     Who are your caregiver(s) and their phone number(s)? Gloria Wilson (mother) [phone]     Prior to hospitilization cognitive status: Unable to Assess;Coma/Sedated/Intubated     Current cognitive status: Unable to Assess;Coma/Sedated/Intubated     Walking or Climbing Stairs Difficulty none     Dressing/Bathing Difficulty none     Equipment Currently Used at Home none     Readmission within 30 days? No     Patient currently being followed by outpatient case management? No     Do you take prescription medications? Yes     Do you have prescription coverage? Yes     Coverage MEDICAID [facility] [place] [facility]     Do you have any problems affording any of your prescribed medications? No     Is the patient taking medications as prescribed? yes     Who is going to help you get home at discharge? patient will need transport home then Gloria Wilson (mother) [phone] will take of patient.     How do you get to doctors appointments? car, drives self     Are you on dialysis? No     Do you take coumadin? No     Discharge Plan A Home with family     Discharge Plan B Home     DME Needed Upon Discharge  other (see comments)   TBD    Discharge Plan discussed with: Parent(s)     Name(s) and Number(s) Gloria Wilson (mother) [phone]     Discharge Barriers Identified Underinsured                               PCP:  No primary care provider on file.  None        Pharmacy:    [id number] - [place], [place] - [address] AT [facility]  [address]  [place] [address]  Phone: [phone] Fax: [phone]        Emergency  Contacts:  Extended Emergency Contact Information  Primary Emergency Contact: LenzGloria richards  Mobile Phone: [phone]  Relation: Mother  Secondary Emergency Contact: harinder lenz  Mobile Phone: [phone]  Relation: Sister      Insurance:    Payor: MEDICAID / [facility] [place] [facility] / Product Type: Managed Medicaid /     Nevin Magana RN     [phone]      09/07/2022  11:50 AM

## 2022-09-07 NOTE — ASSESSMENT & PLAN NOTE
2/2 alcoholic cirrhosis. C/b variceal hemorrhage and possible HE. 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 Hepatology with reccs for medical stabilization prior to eval for possible transplant.    MELD-Na score: 22 at 9/7/2022  4:00 AM  MELD score: 21 at 9/7/2022  4:00 AM  Calculated from:  Serum Creatinine: 1.0 mg/dL at 9/7/2022  4:00 AM  Serum Sodium: 135 mmol/L at 9/7/2022  4:00 AM  Total Bilirubin: 6.3 mg/dL at 9/7/2022  4:00 AM  INR(ratio): 1.9 at 9/7/2022  4:00 AM  Age: 37 years    --OSH ammonia 87; continue lactulose - no BMs x 2 days but with diarrhea for past 2 days - holding lactulose  --f/u PETH  --holding home propranolol in setting of shock requiring vasopressors - can resume as clinically appropriate  - adding lasix and spironolactone for continued chest tube drainage 2/2 hepatic hydrothorax  - giving albumin infusion for volume loss 2/2 hydrothorax (equivalent to large volume paracentesis)  --Hepatology consult, appreciate assistance - patient not a transplant candidate at this time due to continued alcohol use

## 2022-09-07 NOTE — PROGRESS NOTES
[facility] - Cardiac Medical ICU  Critical Care Medicine  Progress Note    Patient Name: Porfirio Wilson  MRN: [id number]  Admission Date: 9/3/2022  Hospital Length of Stay: 4 days  Code Status: Full Code  Attending Provider: Arthur Douglass MD  Primary Care Provider: No primary care provider on file.   Principal Problem: Acute hypoxemic respiratory failure    Subjective:     HPI:  Patient is a 36 y.o. male with significant past medical history of ETOH cirrhosis (complicated by presence of esophageal varices s/p band ligation), bipolar d/o and pancreatitis presented to [facility] on 9/3 complaining of shortness of breath x2 hours with associated fever/chills, cough and diaphoresis. Patient was intubated in the ED due to degree of respiratory distress. Cxry showed right pleural effusion and pneumothorax and focal consolidation on the left. He was found to be Covid positive. Patient had a right side chest tube placed. Labs at OSH significant for leukocytosis of 16, procal 0.46, lactic 5, , INR 2.1, negative troponin, ETOH negative and ammonia 86.  Pt was tx w/ levaquin & flagyl, as well as given IVF & vit K for coagulopathy associated w/ liver dz. Pt is currently requiring levo for map > 65. Last reported ETOH drink ~ 6 weeks ago.     Patient has been transferred to [facility] for higher level of care and Hepatology services.           Hospital/ICU Course:  Pt admitted to [facility] evening of 9/4 for acute hypoxemic respiratory failure and decompensated cirrhosis. Cultures repeated and pt maintained on mechanical ventilation/levo as needed for map > 65. Pt started on dex/remdesivir and continued on levaquin for suspected superimposed bacterial pna. CT chest shows multifocal pna, large right side pleural effusion. There is some concern that the chest tube placed at OSH may be in the lung parenchyma - evaluated by CTS who recommend medical stabilization before any intervention. Chest tube with  continued high output. Abx broadened due to hypothermia. Improving with decreased need for pressors, extubated 9/7.       Interval History/Significant Events: Hypothermic to 95 again overnight. Continued diarrhea - holding lactulose. Doing well on SBT with plans to extubate.     Review of Systems   Unable to perform ROS: Intubated   Objective:     Vital Signs (Most Recent):  Temp: 96.6 °F (35.9 °C) (09/07/22 1100)  Pulse: (!) 48 (09/07/22 1300)  Resp: (!) 24 (09/07/22 1300)  BP: 125/67 (09/07/22 1328)  SpO2: 100 % (09/07/22 1300)   Vital Signs (24h Range):  Temp:  [95.9 °F (35.5 °C)-99.1 °F (37.3 °C)] 96.6 °F (35.9 °C)  Pulse:  [40-99] 48  Resp:  [15-39] 24  SpO2:  [92 %-100 %] 100 %  BP: ()/(51-68) 125/67   Weight: 73.2 kg (161 lb 6 oz)  Body mass index is 21.89 kg/m².      Intake/Output Summary (Last 24 hours) at 9/7/2022 1346  Last data filed at 9/7/2022 0600  Gross per 24 hour   Intake 324.57 ml   Output 2550 ml   Net -2225.43 ml         Physical Exam  Vitals and nursing note reviewed.   Constitutional:       Appearance: He is ill-appearing.   HENT:      Head: Normocephalic and atraumatic.      Right Ear: External ear normal.      Left Ear: External ear normal.      Nose: Nose normal.      Mouth/Throat:      Mouth: Mucous membranes are moist.      Pharynx: Oropharynx is clear.   Eyes:      General: Scleral icterus present.      Pupils: Pupils are equal, round, and reactive to light.   Cardiovascular:      Rate and Rhythm: Normal rate and regular rhythm.      Pulses: Normal pulses.      Heart sounds: Normal heart sounds. No murmur heard.  Pulmonary:      Comments: Mechanically ventilated. R surgical chest tube with serous drainage.   Abdominal:      General: Bowel sounds are normal. There is no distension.      Palpations: Abdomen is soft.      Tenderness: There is no abdominal tenderness.   Musculoskeletal:         General: No swelling or deformity.      Cervical back: Normal range of motion and neck  supple.   Skin:     General: Skin is dry.      Capillary Refill: Capillary refill takes less than 2 seconds.      Coloration: Skin is jaundiced.   Neurological:      Comments: Chemically sedated       Vents:  Vent Mode: Spont (09/07/22 0930)  Set Rate: 12 BPM (09/07/22 0300)  Vt Set: 470 mL (09/07/22 0300)  Pressure Support: 8 cmH20 (09/07/22 0930)  PEEP/CPAP: 5 cmH20 (09/07/22 0930)  Oxygen Concentration (%): 40 (09/07/22 0930)  Peak Airway Pressure: 13 cmH2O (09/07/22 0930)  Plateau Pressure: 16 cmH20 (09/07/22 0930)  Total Ve: 4.65 mL (09/07/22 0930)  Negative Inspiratory Force (cm H2O): -21 (09/07/22 0930)  F/VT Ratio<105 (RSBI): (!) 61.54 (09/07/22 0735)  Lines/Drains/Airways       Central Venous Catheter Line  Duration             Percutaneous Central Line Insertion/Assessment - Triple Lumen  09/03/22 1500 right subclavian 3 days              Drain  Duration                  NG/OG Tube Center mouth -- days         Chest Tube 09/03/22 Right Pleural 4 days         Urethral Catheter 09/05/22 0900 2 days              Peripheral Intravenous Line  Duration                  Peripheral IV - Single Lumen 09/03/22 1500 20 G Left;Posterior Hand 3 days                  Significant Labs:    CBC/Anemia Profile:  Recent Labs   Lab 09/06/22  0300 09/07/22  0400   WBC 11.09 11.08   HGB 9.1* 9.7*   HCT 28.5* 30.5*   PLT 76* 68*   MCV 91 89   RDW 20.1* 19.7*          Chemistries:  Recent Labs   Lab 09/06/22  0300 09/07/22  0400   * 135*   K 4.3 4.0    109   CO2 21* 20*   BUN 34* 40*   CREATININE 1.3 1.0   CALCIUM 7.4* 7.4*   ALBUMIN 1.6* 1.7*   PROT 5.6* 5.9*   BILITOT 6.0* 6.3*   ALKPHOS 95 92   ALT 30 33   AST 60* 64*   MG 1.7 2.2   PHOS 4.0 3.3         All pertinent labs within the past 24 hours have been reviewed.    Significant Imaging:  I have reviewed all pertinent imaging results/findings within the past 24 hours.      ABG  Recent Labs   Lab 09/07/22  0507   PH 7.411   PO2 44*   PCO2 32.7*   HCO3 20.8*   BE  -4     Assessment/Plan:     Neuro  Seizure  --reported to have full tonic-clonic seizure like activity lasting ~ 1 min and resolved spontaneously at onset of transfer from OSH per EMS  --head CT negative for acute intracranial abnormality   --if occurs again, will obtain EEG    ENT  Oropharyngeal bleeding  --~ 100cc blood from oropharynx night of transfer  --no blood from ETT and no blood from OGT  --? If pt bit tongue during above seizure like activity  --continue daily ppi; if concern develops for GIB will change to IV protonix bid & consult GI       Pulmonary  * Acute hypoxemic respiratory failure  Patient with Hypoxic Respiratory failure which is Acute.  he is not on home oxygen. Supplemental oxygen was provided and noted- Vent Mode: Spont  Oxygen Concentration (%):  [40] 40  Resp Rate Total:  [12 br/min-30 br/min] 20 br/min  Vt Set:  [470 mL] 470 mL  PEEP/CPAP:  [5 cmH20] 5 cmH20  Pressure Support:  [8 cmH20] 8 cmH20  Mean Airway Pressure:  [7.2 cmH20-9.9 cmH20] 9.9 cmH20.   Signs/symptoms of respiratory failure include- tachypnea, increased work of breathing and respiratory distress. Contributing diagnoses includes - Pleural effusion, Pneumonia and pneumothorax Labs and images were reviewed. Patient Has not had a recent ABG. Will treat underlying causes and adjust management of respiratory failure as follows-   --Covid-19 positive (pt not vaccinated); concern for superimposed bacterial pna given leukocytosis and elevated procal  --chest CT with bilateral patchy infiltrates consistent with Covid-19  --right plural effusion (suspect hepatohydrothorax) + pneumothorax (unclear if procedural complication) s/p chest tube placement at OSH. CT imaging concerning for chest tube in lung parenchyma vs lung re-expansion around tube. CTS evaluation with recs for stabilization prior to any intervention  --continue dex + remdesivir  --minimal residual ptx on chest CT.   --f/u blood and sputum cx; f/u results & tailor abx as  appropriate - vancomycin discontinued 9/7  --f/u pleural fluid studies - will add on LDH, glucose, protein - c/w transudative process likely hepatohydrothorax  - given continued diarrhea and hypothermia, checking legionella urine antigen    Renal/  Urinary retention  S/p ashby placement 9/5 with clear urine output.     GI  Decompensated hepatic cirrhosis  2/2 alcoholic cirrhosis. C/b variceal hemorrhage and possible HE. 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 Hepatology with reccs for medical stabilization prior to eval for possible transplant.    MELD-Na score: 22 at 9/7/2022  4:00 AM  MELD score: 21 at 9/7/2022  4:00 AM  Calculated from:  Serum Creatinine: 1.0 mg/dL at 9/7/2022  4:00 AM  Serum Sodium: 135 mmol/L at 9/7/2022  4:00 AM  Total Bilirubin: 6.3 mg/dL at 9/7/2022  4:00 AM  INR(ratio): 1.9 at 9/7/2022  4:00 AM  Age: 37 years    --OSH ammonia 87; continue lactulose - no BMs x 2 days but with diarrhea for past 2 days - holding lactulose  --f/u PETH  --holding home propranolol in setting of shock requiring vasopressors  - adding lasix and spironolactone for continued chest tube drainage 2/2 hepatic hydrothorax  - giving albumin infusion for volume loss 2/2 hydrothorax (equivalent to large volume paracentesis)  --Hepatology consult, appreciate assistance     Critical Care Daily Checklist:    A: Awake: RASS Goal/Actual Goal:    Actual: [place] Agitation Sedation Scale (RASS): Restless   B: Spontaneous Breathing Trial Performed? Spon. Breathing Trial Initiated?: Initiated (09/07/22 0930)   C: SAT & SBT Coordinated?  Yes                      D: Delirium: CAM-ICU Overall CAM-ICU: Positive   E: Early Mobility Performed? Yes   F: Feeding Goal:    Status:     Current Diet Order   No orders of the defined types were placed in this encounter.      AS: Analgesia/Sedation Propofol   T: Thromboembolic Prophylaxis lovenox   H: HOB > 300 Yes   U: Stress Ulcer Prophylaxis (if needed) PPI   G:  Glucose Control 140-180   B: Bowel Function Stool Occurrence: 1   I: Indwelling Catheter (Lines & Hawthorne) Necessity Hawthorne, R subclavian CVC, PIV   D: De-escalation of Antimicrobials/Pharmacotherapies F/u cultures and de-escalate    Plan for the day/ETD Extubate    Code Status:  Family/Goals of Care: Full Code  Continue care       Critical secondary to Patient has a condition that poses threat to life and bodily function: Severe Respiratory Distress      Critical care was time spent personally by me on the following activities: development of treatment plan with patient or surrogate and bedside caregivers, discussions with consultants, evaluation of patient's response to treatment, examination of patient, ordering and performing treatments and interventions, ordering and review of laboratory studies, ordering and review of radiographic studies, pulse oximetry, re-evaluation of patient's condition. This critical care time did not overlap with that of any other provider or involve time for any procedures.     Abdiaziz Garay MD  Critical Care Medicine  [facility] - Cardiac Medical ICU

## 2022-09-07 NOTE — ASSESSMENT & PLAN NOTE
Patient with Hypoxic Respiratory failure which is Acute.  he is not on home oxygen. Supplemental oxygen was provided and noted- Vent Mode: Spont  Oxygen Concentration (%):  [40] 40  Resp Rate Total:  [12 br/min-30 br/min] 20 br/min  Vt Set:  [470 mL] 470 mL  PEEP/CPAP:  [5 cmH20] 5 cmH20  Pressure Support:  [8 cmH20] 8 cmH20  Mean Airway Pressure:  [7.2 cmH20-9.9 cmH20] 9.9 cmH20.   Signs/symptoms of respiratory failure include- tachypnea, increased work of breathing and respiratory distress. Contributing diagnoses includes - Pleural effusion, Pneumonia and pneumothorax Labs and images were reviewed. Patient Has not had a recent ABG. Will treat underlying causes and adjust management of respiratory failure as follows-   --Covid-19 positive (pt not vaccinated); concern for superimposed bacterial pna given leukocytosis and elevated procal  --chest CT with bilateral patchy infiltrates consistent with Covid-19  --right plural effusion (suspect hepatohydrothorax) + pneumothorax (unclear if procedural complication) s/p chest tube placement at OSH. CT imaging concerning for chest tube in lung parenchyma vs lung re-expansion around tube. CTS evaluation with recs for stabilization prior to any intervention  --continue dex + remdesivir  --minimal residual ptx on chest CT.   --f/u blood and sputum cx; f/u results & tailor abx as appropriate - vancomycin discontinued 9/7  --f/u pleural fluid studies - will add on LDH, glucose, protein - c/w transudative process likely hepatohydrothorax  - given continued diarrhea and hypothermia, checking legionella urine antigen

## 2022-09-07 NOTE — ASSESSMENT & PLAN NOTE
2/2 alcoholic cirrhosis. C/b variceal hemorrhage and possible HE. 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 Hepatology with reccs for medical stabilization prior to eval for possible transplant.    MELD-Na score: 22 at 9/7/2022  4:00 AM  MELD score: 21 at 9/7/2022  4:00 AM  Calculated from:  Serum Creatinine: 1.0 mg/dL at 9/7/2022  4:00 AM  Serum Sodium: 135 mmol/L at 9/7/2022  4:00 AM  Total Bilirubin: 6.3 mg/dL at 9/7/2022  4:00 AM  INR(ratio): 1.9 at 9/7/2022  4:00 AM  Age: 37 years    --OSH ammonia 87; continue lactulose - no BMs x 2 days but with diarrhea for past 2 days - holding lactulose  --f/u PETH  --holding home propranolol in setting of shock requiring vasopressors  --Hepatology consult, appreciate assistance

## 2022-09-08 LAB
ALBUMIN SERPL BCP-MCNC: 2.3 G/DL (ref 3.5–5.2)
ALP SERPL-CCNC: 82 U/L (ref 55–135)
ALT SERPL W/O P-5'-P-CCNC: 32 U/L (ref 10–44)
ANION GAP SERPL CALC-SCNC: 7 MMOL/L (ref 8–16)
AST SERPL-CCNC: 61 U/L (ref 10–40)
BASOPHILS # BLD AUTO: 0 K/UL (ref 0–0.2)
BASOPHILS NFR BLD: 0 % (ref 0–1.9)
BILIRUB SERPL-MCNC: 6.6 MG/DL (ref 0.1–1)
BUN SERPL-MCNC: 40 MG/DL (ref 6–20)
CALCIUM SERPL-MCNC: 7.8 MG/DL (ref 8.7–10.5)
CHLORIDE SERPL-SCNC: 109 MMOL/L (ref 95–110)
CO2 SERPL-SCNC: 20 MMOL/L (ref 23–29)
CREAT SERPL-MCNC: 1.2 MG/DL (ref 0.5–1.4)
DIFFERENTIAL METHOD: ABNORMAL
EOSINOPHIL # BLD AUTO: 0 K/UL (ref 0–0.5)
EOSINOPHIL NFR BLD: 0 % (ref 0–8)
ERYTHROCYTE [DISTWIDTH] IN BLOOD BY AUTOMATED COUNT: 19.1 % (ref 11.5–14.5)
EST. GFR  (NO RACE VARIABLE): >60 ML/MIN/1.73 M^2
GLUCOSE SERPL-MCNC: 114 MG/DL (ref 70–110)
HCT VFR BLD AUTO: 30.1 % (ref 40–54)
HGB BLD-MCNC: 9.7 G/DL (ref 14–18)
IMM GRANULOCYTES # BLD AUTO: 0.04 K/UL (ref 0–0.04)
IMM GRANULOCYTES NFR BLD AUTO: 0.4 % (ref 0–0.5)
INR PPP: 2 (ref 0.8–1.2)
LYMPHOCYTES # BLD AUTO: 0.7 K/UL (ref 1–4.8)
LYMPHOCYTES NFR BLD: 7.1 % (ref 18–48)
MAGNESIUM SERPL-MCNC: 2 MG/DL (ref 1.6–2.6)
MCH RBC QN AUTO: 29.1 PG (ref 27–31)
MCHC RBC AUTO-ENTMCNC: 32.2 G/DL (ref 32–36)
MCV RBC AUTO: 90 FL (ref 82–98)
MONOCYTES # BLD AUTO: 0.4 K/UL (ref 0.3–1)
MONOCYTES NFR BLD: 4.2 % (ref 4–15)
NEUTROPHILS # BLD AUTO: 9.1 K/UL (ref 1.8–7.7)
NEUTROPHILS NFR BLD: 88.3 % (ref 38–73)
NRBC BLD-RTO: 0 /100 WBC
PETH 16:0/18.1 (POPETH): <10 NG/ML
PETH 16:0/18.2 (PLPETH): <10 NG/ML
PHOSPHATE SERPL-MCNC: 3.6 MG/DL (ref 2.7–4.5)
PLATELET # BLD AUTO: 70 K/UL (ref 150–450)
PMV BLD AUTO: ABNORMAL FL (ref 9.2–12.9)
POTASSIUM SERPL-SCNC: 4.2 MMOL/L (ref 3.5–5.1)
PROT SERPL-MCNC: 6 G/DL (ref 6–8.4)
PROTHROMBIN TIME: 20.3 SEC (ref 9–12.5)
RBC # BLD AUTO: 3.33 M/UL (ref 4.6–6.2)
SODIUM SERPL-SCNC: 136 MMOL/L (ref 136–145)
WBC # BLD AUTO: 10.27 K/UL (ref 3.9–12.7)

## 2022-09-08 PROCEDURE — 99291 PR CRITICAL CARE, E/M 30-74 MINUTES: ICD-10-PCS | Mod: ,,, | Performed by: STUDENT IN AN ORGANIZED HEALTH CARE EDUCATION/TRAINING PROGRAM

## 2022-09-08 PROCEDURE — 20000000 HC ICU ROOM

## 2022-09-08 PROCEDURE — 93010 ELECTROCARDIOGRAM REPORT: CPT | Mod: ,,, | Performed by: INTERNAL MEDICINE

## 2022-09-08 PROCEDURE — 80053 COMPREHEN METABOLIC PANEL: CPT | Performed by: NURSE PRACTITIONER

## 2022-09-08 PROCEDURE — 97165 OT EVAL LOW COMPLEX 30 MIN: CPT

## 2022-09-08 PROCEDURE — 27000207 HC ISOLATION

## 2022-09-08 PROCEDURE — 94761 N-INVAS EAR/PLS OXIMETRY MLT: CPT

## 2022-09-08 PROCEDURE — 25000003 PHARM REV CODE 250: Performed by: STUDENT IN AN ORGANIZED HEALTH CARE EDUCATION/TRAINING PROGRAM

## 2022-09-08 PROCEDURE — 84100 ASSAY OF PHOSPHORUS: CPT | Performed by: NURSE PRACTITIONER

## 2022-09-08 PROCEDURE — 63600175 PHARM REV CODE 636 W HCPCS: Performed by: STUDENT IN AN ORGANIZED HEALTH CARE EDUCATION/TRAINING PROGRAM

## 2022-09-08 PROCEDURE — 25000003 PHARM REV CODE 250

## 2022-09-08 PROCEDURE — 85025 COMPLETE CBC W/AUTO DIFF WBC: CPT | Performed by: NURSE PRACTITIONER

## 2022-09-08 PROCEDURE — 83735 ASSAY OF MAGNESIUM: CPT | Performed by: NURSE PRACTITIONER

## 2022-09-08 PROCEDURE — 25000003 PHARM REV CODE 250: Performed by: INTERNAL MEDICINE

## 2022-09-08 PROCEDURE — 27000221 HC OXYGEN, UP TO 24 HOURS

## 2022-09-08 PROCEDURE — 85610 PROTHROMBIN TIME: CPT | Performed by: NURSE PRACTITIONER

## 2022-09-08 PROCEDURE — 93010 EKG 12-LEAD: ICD-10-PCS | Mod: ,,, | Performed by: INTERNAL MEDICINE

## 2022-09-08 PROCEDURE — 93005 ELECTROCARDIOGRAM TRACING: CPT

## 2022-09-08 PROCEDURE — 63600175 PHARM REV CODE 636 W HCPCS: Performed by: NURSE PRACTITIONER

## 2022-09-08 PROCEDURE — C9113 INJ PANTOPRAZOLE SODIUM, VIA: HCPCS

## 2022-09-08 PROCEDURE — 97535 SELF CARE MNGMENT TRAINING: CPT

## 2022-09-08 PROCEDURE — 63600175 PHARM REV CODE 636 W HCPCS

## 2022-09-08 PROCEDURE — 99291 CRITICAL CARE FIRST HOUR: CPT | Mod: ,,, | Performed by: STUDENT IN AN ORGANIZED HEALTH CARE EDUCATION/TRAINING PROGRAM

## 2022-09-08 PROCEDURE — 99900035 HC TECH TIME PER 15 MIN (STAT)

## 2022-09-08 RX ORDER — SPIRONOLACTONE 100 MG/1
1 TABLET, FILM COATED ORAL DAILY
Status: ON HOLD | COMMUNITY
Start: 2022-08-23 | End: 2022-09-18 | Stop reason: SDUPTHER

## 2022-09-08 RX ADMIN — SPIRONOLACTONE 100 MG: 100 TABLET ORAL at 08:09

## 2022-09-08 RX ADMIN — DEXAMETHASONE SODIUM PHOSPHATE 6 MG: 4 INJECTION INTRA-ARTICULAR; INTRALESIONAL; INTRAMUSCULAR; INTRAVENOUS; SOFT TISSUE at 08:09

## 2022-09-08 RX ADMIN — PANTOPRAZOLE SODIUM 40 MG: 40 INJECTION, POWDER, FOR SOLUTION INTRAVENOUS at 08:09

## 2022-09-08 RX ADMIN — MUPIROCIN: 20 OINTMENT TOPICAL at 08:09

## 2022-09-08 RX ADMIN — FOLIC ACID 1 MG: 1 TABLET ORAL at 08:09

## 2022-09-08 RX ADMIN — ENOXAPARIN SODIUM 40 MG: 100 INJECTION SUBCUTANEOUS at 04:09

## 2022-09-08 RX ADMIN — Medication 100 MG: at 08:09

## 2022-09-08 RX ADMIN — LACTULOSE 20 G: 20 SOLUTION ORAL at 03:09

## 2022-09-08 RX ADMIN — LACTULOSE 20 G: 20 SOLUTION ORAL at 08:09

## 2022-09-08 RX ADMIN — RIFAXIMIN 550 MG: 550 TABLET ORAL at 08:09

## 2022-09-08 RX ADMIN — PIPERACILLIN SODIUM AND TAZOBACTAM SODIUM 4.5 G: 4; .5 INJECTION, POWDER, LYOPHILIZED, FOR SOLUTION INTRAVENOUS at 02:09

## 2022-09-08 RX ADMIN — FUROSEMIDE 40 MG: 10 INJECTION, SOLUTION INTRAMUSCULAR; INTRAVENOUS at 08:09

## 2022-09-08 NOTE — ASSESSMENT & PLAN NOTE
Patient reports no history of retention. S/p ashby placement 9/5 by urology with clear urine output.

## 2022-09-08 NOTE — TREATMENT PLAN
[facility]  Hepatology  Treatment Plan    Patient Name: Porfirio Wilson  MRN: [id number]  Admission Date: 9/3/2022  Hospital Length of Stay: 5 days    Subjective:     Interval History:  Chart reviewed: Extubated. Remains on low dose pressors. MELD 24.     No current facility-administered medications on file prior to encounter.     Current Outpatient Medications on File Prior to Encounter   Medication Sig Dispense Refill    ferrous sulfate 325 (65 FE) MG EC tablet Take 1 tablet (325 mg total) by mouth once daily. 30 tablet 2    folic acid (FOLVITE) 1 MG tablet Take 1 tablet (1 mg total) by mouth once daily. 30 tablet 2    lactulose (CHRONULAC) 10 gram/15 mL solution Take 15 mLs (10 g total) by mouth 3 (three) times daily. TITRATE TO 3-4 BOWEL MOVEMENTS DAILY. 1350 mL 2    propranoloL (INDERAL) 10 MG tablet Take 1 tablet (10 mg total) by mouth 2 (two) times daily. 60 tablet 2    spironolactone (ALDACTONE) 100 MG tablet Take 1 tablet by mouth once daily.      thiamine 100 MG tablet Take 1 tablet (100 mg total) by mouth once daily. 30 tablet 2       Review of patient's allergies indicates:   Allergen Reactions    Ampicillin      Tolerated Zosyn 09/2022    Ceclor [cefaclor]      Tolerated Zosyn 09/2022         Objective:     Vitals:    09/08/22 0818   BP: (!) 96/58   Pulse:    Resp:    Temp:        Significant Labs:  Recent Labs   Lab 09/06/22  0300 09/07/22  0400 09/08/22  0309   HGB 9.1* 9.7* 9.7*       Lab Results   Component Value Date    WBC 10.27 09/08/2022    HGB 9.7 (L) 09/08/2022    HCT 30.1 (L) 09/08/2022    MCV 90 09/08/2022    PLT 70 (L) 09/08/2022       Lab Results   Component Value Date     09/08/2022    K 4.2 09/08/2022     09/08/2022    CO2 20 (L) 09/08/2022    BUN 40 (H) 09/08/2022    CREATININE 1.2 09/08/2022    CALCIUM 7.8 (L) 09/08/2022    ANIONGAP 7 (L) 09/08/2022       Lab Results   Component Value Date    ALT 32 09/08/2022    AST 61 (H) 09/08/2022    ALKPHOS 82  09/08/2022    BILITOT 6.6 (H) 09/08/2022       Lab Results   Component Value Date    INR 2.0 (H) 09/08/2022    INR 1.9 (H) 09/07/2022    INR 1.8 (H) 09/06/2022       Significant Imaging:  Reviewed pertinent radiology findings.       Assessment/Plan:     35yo PMHx decompensated EtOH cirrhosis (HE, no ascites) c/b reported varices--not followed here, pancreatitis presents as transfer 09/04 for hepatology eval.     With COVID and chest tube for PTX/ pleural effusion, pressor requirement, intubated.     Diagnostic paracentesis negaive     MELD-Na stable around 23-24     Unfortunately he is not a candidate for liver transplant at this time due to continued EtOH use     Problem List:  Decompensated EtOH cirrhosis (HE)     Recommendations:  - Daily CMP, INR  - Lactulose to titrate 3-4 BM/d    Thank you for involving us in the care of Porfirio Wilson. Please call with any additional questions, concerns or changes in the patient's clinical status. We will sign off.     Ricardo Alonzo MD  Gastroenterology Fellow PGY IV   [facility]

## 2022-09-08 NOTE — PT/OT/SLP EVAL
Occupational Therapy   Evaluation     Name: Porfirio Wilson  MRN: [id number]  Admitting Diagnosis:  Acute hypoxemic respiratory failure    Length of Stay: 5 days    Recommendations:     Discharge Recommendations: rehabilitation facility  Discharge Equipment Recommendations:  other (see comments) (TBD)  Barriers to discharge:  Inaccessible home environment, Decreased caregiver support    Plan:     Patient to be seen 3 x/week to address the above listed problems via self-care/home management, therapeutic activities, therapeutic exercises  Plan of Care Expires: 10/08/22  Plan of Care Reviewed with: patient, other (see comments) (mom and sister on phone)    Assessment:     Porfirio Wilson is a 37 y.o. male with a medical diagnosis of Acute hypoxemic respiratory failure.  He presents with the following performance deficits affecting function: weakness, impaired endurance, impaired self care skills, impaired functional mobility, gait instability, impaired balance, decreased upper extremity function, decreased lower extremity function, decreased coordination, decreased safety awareness, pain, impaired coordination, impaired fine motor, impaired skin, impaired cardiopulmonary response to activity.      Pt pleasant and oriented, improved mentation from AM per RN. Pt severely debilitated and highly distracted by pain at R chest tube site with standing trial. Pt on 4L O2, desatted with standing trial to 83-4, O2 increased to 5L. RN aware. Pt assisted in calling mom and sister on room phone.  Pt is an excellent rehab candidate.     Rehab Prognosis: Good; patient would benefit from acute skilled OT services to address these deficits and reach maximum level of function.       Subjective   Communicated with: RN prior to session.  Patient found HOB elevated with bed alarm, blood pressure cuff, chest tube, ashby catheter, oxygen, peripheral IV, pulse ox (continuous), telemetry upon OT entry to room.    Chief Complaint: No chief complaint  "on file.    Patient/Family Comments/goals: "I really didn't do well" re: standing trial    Pain/Comfort:  Pain Rating 1: other (see comments) ("soreness" at CT site to R chest)  Location - Side 1: Right  Location - Orientation 1: generalized  Location 1: chest (and "back")  Pain Addressed 1: Pre-medicate for activity, Reposition, Distraction  Pain Rating Post-Intervention 1: 10/10 (increased pain with standing trial, pt unable to tolerate)    Patients cultural, spiritual, Hinduism conflicts given the current situation: no    Occupational Profile:  Living Environment: pt lives with mom and sister in [facility], 1STE, L HR, tubshower w shower seat  Prior Level of Function: Patient reports being IND with mobility & with ADLs.   Patient uses DME as follows: none.   DME owned (not currently used): shower chair and shower chair in place for mother who recently had hip surgery/cancer .  Roles/Repsonsibilities:   Hand Dominance: right   Work: yes, paints homes.   Drive: no.   Managing Medicines/Managing Home: yes.   Hobbies: enjoys being active and independent.  Equipment Used at Home:  none    Patient reports they will have assistance from family upon discharge.      Objective:     Patient found with: bed alarm, blood pressure cuff, chest tube, ashby catheter, oxygen, peripheral IV, pulse ox (continuous), telemetry   General Precautions: Standard, Cardiac airborne, aspiration, contact, droplet, fall, respiratory   Orthopedic Precautions:N/A   Braces: N/A   Respiratory Status:   Nasal cannula, flow 4-5 L/min  Vitals: BP (!) 93/57 (BP Location: Left arm, Patient Position: Lying)   Pulse (!) 52   Temp 98.7 °F (37.1 °C) (Axillary)   Resp 12   Ht 6' (1.829 m)   Wt 73.2 kg (161 lb 6 oz)   SpO2 95%   BMI 21.89 kg/m²     Cognitive and Psychosocial Function:   AxOx4 --    Follows Commands/attention:easily distracted by pain and follows one-step commands  Communication:  clear/fluent  Memory: No Deficits noted  Safety " awareness/insight to disability: intact   Mood/Affect/Coping skills/emotional control: Appropriate to situation and Cooperative    Hearing: Intact    Vision:  Intact visual fields    Physical Exam:  Postural examination/scapula alignment:    -       Rounded shoulders  -       Forward head  Skin integrity: Visible skin intact  NOTE: gown wet near CT site, RN notified.     Left UE Right UE   UE Edema absent absent   UE ROM AROM WFL AROM WFL   UE Strength 4/5 4/5    Strength moderate composite grasp moderate composite grasp   Sensation LUE INTACT:WFL RUE INTACT: WFL   Fine Motor Coordination:  LUE IMPAIRED: hand, finger to nose, hand thumb/finger opposition skills , and manipulation of objects RUE IMPAIRED: hand, finger to nose, hand thumb/finger opposition skills , and manipulation of objects   Gross Motor Coordination: LUE IMPAIRED: WFL, limited by fatigue and impaired functional endurance RUE IMPAIRED:WFL, limited by fatigue and impaired functional endurance     Occupational Performance:  Bed Mobility:    Patient completed Rolling/Turning to Left with  minimum assistance  Patient completed Rolling/Turning to Right with minimum assistance  Scooting to HOB in supine: minimum assistance  Patient completed Supine to Sit with minimum assistance on R side of bed  Scooting anteriorly to EOB to have both feet planted on floor: moderate assistance  Patient completed Sit to Supine with total assistance and leg lift on R side of bed  Pt highly distracted by pain and desat to 85    Functional Mobility/Transfers:  Static Sitting EOB: CGA, fatigued khyphosis  Dynamic Sitting EOB: CGA  Patient completed Sit <> Stand Transfer with maximal assistance and of 2 persons  with  hand-held assist   Static Standing Balance: Max A x2 person A  Tolerated aprx 20 seconds 2* pt endorsing severe pain and low SPO2  Further mobility deferred 2* impaired SpO2 maintenance (SpO2 at 83-85, O2 increased to 5L) and impaired arousal as patient  fatigued sitting EOB with staff assistance.   Note: pt improved to 93 with HOB elevated upon return to bed level, left on 5L    Activities of Daily Living:  Feeding:  contact guard assistance set up assist, bed in chair position. Pt drinking from cup w RUE upon entry  Lower Body Dressing: total assistance donning socks at bed level  Toileting:  ashby in place      [facility] 6 Click ADL:  AMPA Total Score: 15    Treatment & Education:  -OT POC, safety during ADLs and mobility   -Education on energy conservation and task modification to maximize safety and independence  -Questions answered within OT scope of practice.      Patient left with bed in chair position with all lines intact, call button in reach, RN notified, and   present    GOALS:   Multidisciplinary Problems       Occupational Therapy Goals          Problem: Occupational Therapy    Goal Priority Disciplines Outcome Interventions   Occupational Therapy Goal     OT, PT/OT Ongoing, Progressing    Description: Goals set on 9/8 with expiration date 10/8:  Patient will increase functional independence with ADLs by performing:    Supine <> Sit with Stand-by Assistance.  Feeding with Stand-by Assistance.  Grooming while standing at sink with Stand-by Assistance.  UB Dressing with Stand-by Assistance.  LB Dressing with Stand-by Assistance.  Step transfer with Stand-by Assistance with DME as needed.  Pt will demonstrate understanding of education provided regarding energy conservation and task modification through teach-back method.                          History:     Past Medical History:   Diagnosis Date    GERD (gastroesophageal reflux disease)     History of alcohol abuse          Past Surgical History:   Procedure Laterality Date    EGD, WITH BANDING OF VARICES         Time Tracking:       OT Date of Treatment: 09/08/22  OT Start Time: 1451  OT Stop Time: 1530  OT Total Time (min): 39 min  Additional staff present: Rehab Tech      Billable Minutes:Evaluation  10  Self Care/Home Management 29      9/8/2022

## 2022-09-08 NOTE — PLAN OF CARE
Patient rested intermittently throughout the night. Patient continues to show signs of delirium but is easily redirected. Vitals remained stable throughout the night (see flow sheet). Chest tube remained clamped overnight per order. Adequate urine output (see flow sheet). Plan of care and education reviewed with patient. Will continue to monitor.

## 2022-09-08 NOTE — PLAN OF CARE
Problem: Occupational Therapy  Goal: Occupational Therapy Goal  Description: Goals set on 9/8 with expiration date 10/8:  Patient will increase functional independence with ADLs by performing:    Supine <> Sit with Stand-by Assistance.  Feeding with Stand-by Assistance.  Grooming while standing at sink with Stand-by Assistance.  UB Dressing with Stand-by Assistance.  LB Dressing with Stand-by Assistance.  Step transfer with Stand-by Assistance with DME as needed.  Pt will demonstrate understanding of education provided regarding energy conservation and task modification through teach-back method.     Outcome: Ongoing, Progressing       Occupational Therapy recommends a Rehab Facility upon discharge to maximize return to PLOF and address deficits listed above.

## 2022-09-08 NOTE — PLAN OF CARE
CMICU DAILY GOALS       A: Awake    RASS: Goal - RASS Goal: 0-->alert and calm  Actual - RASS ([place] Agitation-Sedation Scale): 0-->alert and calm   Restraint necessity: Clinical Justification: Treatment Interference  B: Breathe   SBT: Not intubated   C: Coordinate A & B, analgesics/sedatives   Pain: managed    SAT: Not intubated  D: Delirium   CAM-ICU: Overall CAM-ICU: Negative  E: Early(intubated/ Progressive (non-intubated) Mobility   MOVE Screen: Fail   Activity: Activity Management: Arm raise - L1, Rolling - L1  FAS: Feeding/Nutrition   Diet order: Diet/Nutrition Received: regular,    T: Thrombus   DVT prophylaxis: VTE Required Core Measure: Pharmacological prophylaxis initiated/maintained  H: HOB Elevation   Head of Bed (HOB) Positioning: HOB at 30-45 degrees  U: Ulcer Prophylaxis   GI: yes  G: Glucose control   Not a DM.     S: Skin   Bathing/Skin Care: bath, complete  Device Skin Pressure Protection: absorbent pad utilized/changed, adhesive use limited  Pressure Reduction Devices: alternating pressure pump (ADD), foam padding utilized, positioning supports utilized, pressure-redistributing mattress utilized  Pressure Reduction Techniques: frequent weight shift encouraged, heels elevated off bed, pressure points protected, weight shift assistance provided  Skin Protection: adhesive use limited, incontinence pads utilized, protective footwear used, transparent dressing maintained, tubing/devices free from skin contact  B: Bowel Function   diarrhea   I: Indwelling Catheters   [place] necessity:      Urethral Catheter 09/05/22 0900-Reason for Continuing Urinary Catheterization: Placed by Urology Service  [REMOVED]      Urethral Catheter 09/04/22 0600 Coude 14 Fr.-Reason for Continuing Urinary Catheterization: Critically ill in ICU and requiring hourly monitoring of intake/output   CVC necessity: No  D: De-escalation Antibiotics   Yes    Family/Goals of care/Code Status   Code Status: Full Code    24H Vital Sign  Range  Temp:  [97.5 °F (36.4 °C)-98.7 °F (37.1 °C)]   Pulse:  [37-82]   Resp:  [12-36]   BP: ()/(50-69)   SpO2:  [90 %-99 %]      Shift Events     Pt passed a swallow study and he was advanced to a regular diet. He is tolerating fluids well, but continues to have a poor food intake. PT eval and treated him today. He did not tolerate getting OOB to a chair. He started to c/o pain to his rt lateral chest pain (CT area), MD was notified and a CTscan was completed this afternoon. Dr Jeffrey redressed the CT site late today after noting that there was a moderate amount of yellow drainage noted on his gown and bed pad. The drainage started after he tried to stand with PT.  Pt tolerated well. Rec'vd new verbal orders to keep the Chest tube unclamped and to reclamp if >1.5 liters of drainage noted each shift. Pt was emotional and teared up several times when talking about his  father and his alcoholism. Resources for him were given. VSS, afebrile his shift. RT SC TLC was dc'd and 2 new PIV were started. No c/o at this time.     VS and assessment per flow sheet, patient progressing towards goals as tolerated, plan of care reviewed with    patient, all concerns addressed, will continue to monitor.    Asya Lopez RN

## 2022-09-08 NOTE — RESIDENT HANDOFF
Handoff     Primary Team: [facility] CRITICAL CARE MEDICINE TEAM 1 Room Number: 6087/6087 A     Patient Name: Porfirio Wilson MRN: [id number]     Date of Birth: [id number] Allergies: Ampicillin and Ceclor [cefaclor]     Age: 37 y.o. Admit Date: 9/3/2022     Sex: male  BMI: Body mass index is 21.89 kg/m².     Code Status: Full Code        Illness Level: Watcher - No    Reason for Admission: Acute hypoxemic respiratory failure    Brief HPI (pertinent PMH and diagnosis or differential diagnosis): 37-year-old male with history of EtOH cirrhosis, bipolar disorder presenting from outside hospital for hypoxic respiratory failure s/p right-sided surgical chest tube placement for likely hepatic hydrothorax. COVID positive.       Hospital Course (updated, brief assessment by system or problem, significant events): Pt admitted to [facility] evening of 9/4 for acute hypoxemic respiratory failure and decompensated cirrhosis. Cultures repeated and pt maintained on mechanical ventilation/levo as needed for map > 65. Pt started on dex/remdesivir for COVID. Completed 4 days of abx for possible superimposed pneumonia. CT chest shows multifocal pna, large right side pleural effusion. Abx briefly broadened due to hypothermia - now discontinued due to euthermia, negative culture data. There is some concern that the chest tube placed at OSH may be in the lung parenchyma vs enveloped by re-expanding lung - evaluated by CTS who recommend medical stabilization before any intervention. Chest tube with continued high output - now clamped and pending repeat CT for evaluation of position. Extubated and stable on RA 9/7. Hepatology evaluated and patient will not be considered for transplant due to continued EtOH use.    Tasks (specific, using if-then statements):   R chest tube - clamped and pending CT for better delineation of placement. CTSx evaluated on admission but recommended he be stabilized prior to intervention.   EtOH cirrhosis: on rifaximin and lactulose.  Previously on propranolol which was held due to soft BP. Hepatology following.    Contingency Plan (special circumstances anticipated and plan): If increasing oxygen needs, consider ABG and MICU consult.    Estimated Discharge Date: 9/10/22    Discharge Disposition: Admitted as an Inpatient    Mentored By: Dr. Arthur Douglass

## 2022-09-08 NOTE — SUBJECTIVE & OBJECTIVE
Interval History/Significant Events: Extubated yesterday. Afebrile and stable overnight on RA. Having bowel movements regularly. Alert and awake in bed, fully conversant. No SOB, denies fevers or chills. No other acute complaint voiced.    Review of Systems   Constitutional:  Negative for chills and fever.   HENT:  Negative for sinus pain and trouble swallowing.    Eyes:  Negative for pain and redness.   Respiratory:  Negative for chest tightness and shortness of breath.    Cardiovascular:  Negative for palpitations and leg swelling.   Gastrointestinal:  Negative for blood in stool and constipation.   Genitourinary:  Negative for flank pain and frequency.   Musculoskeletal:  Negative for back pain and joint swelling.   Skin:  Positive for color change. Negative for wound.   Neurological:  Negative for light-headedness and headaches.   Psychiatric/Behavioral:  Negative for agitation and confusion.    Objective:     Vital Signs (Most Recent):  Temp: 98.7 °F (37.1 °C) (09/08/22 1100)  Pulse: (!) 52 (09/08/22 1200)  Resp: 12 (09/08/22 1200)  BP: (!) 93/57 (09/08/22 1200)  SpO2: 99 % (09/08/22 1200)   Vital Signs (24h Range):  Temp:  [97.5 °F (36.4 °C)-98.7 °F (37.1 °C)] 98.7 °F (37.1 °C)  Pulse:  [37-60] 52  Resp:  [12-36] 12  SpO2:  [90 %-100 %] 99 %  BP: ()/(50-69) 93/57   Weight: 73.2 kg (161 lb 6 oz)  Body mass index is 21.89 kg/m².      Intake/Output Summary (Last 24 hours) at 9/8/2022 1322  Last data filed at 9/8/2022 1200  Gross per 24 hour   Intake 1584.13 ml   Output 2630 ml   Net -1045.87 ml         Physical Exam  Vitals and nursing note reviewed.   Constitutional:       Appearance: Normal appearance. He is not ill-appearing.   HENT:      Head: Normocephalic and atraumatic.      Right Ear: External ear normal.      Left Ear: External ear normal.      Nose: Nose normal.      Mouth/Throat:      Mouth: Mucous membranes are moist.      Pharynx: Oropharynx is clear.   Eyes:      General: Scleral icterus  present.      Pupils: Pupils are equal, round, and reactive to light.   Cardiovascular:      Rate and Rhythm: Regular rhythm. Bradycardia present.      Pulses: Normal pulses.      Heart sounds: Normal heart sounds. No murmur heard.  Pulmonary:      Effort: Pulmonary effort is normal.      Breath sounds: Normal breath sounds.      Comments: R surgical chest tube, dressing c/d/i  Abdominal:      General: Bowel sounds are normal. There is no distension.      Palpations: Abdomen is soft.      Tenderness: There is no abdominal tenderness.   Musculoskeletal:         General: No swelling or deformity.      Cervical back: Normal range of motion and neck supple.      Right lower leg: No edema.      Left lower leg: No edema.   Skin:     General: Skin is dry.      Capillary Refill: Capillary refill takes less than 2 seconds.      Coloration: Skin is jaundiced.   Neurological:      General: No focal deficit present.      Mental Status: He is alert and oriented to person, place, and time.       Vents:  Vent Mode: Spont (09/07/22 0930)  Set Rate: 12 BPM (09/07/22 0300)  Vt Set: 470 mL (09/07/22 0300)  Pressure Support: 8 cmH20 (09/07/22 0930)  PEEP/CPAP: 5 cmH20 (09/07/22 0930)  Oxygen Concentration (%): 40 (09/07/22 0930)  Peak Airway Pressure: 13 cmH2O (09/07/22 0930)  Plateau Pressure: 16 cmH20 (09/07/22 0930)  Total Ve: 4.65 mL (09/07/22 0930)  Negative Inspiratory Force (cm H2O): -21 (09/07/22 0930)  F/VT Ratio<105 (RSBI): (!) 61.54 (09/07/22 0735)  Lines/Drains/Airways       Central Venous Catheter Line  Duration             Percutaneous Central Line Insertion/Assessment - Triple Lumen  09/03/22 1500 right subclavian 4 days              Drain  Duration                  Chest Tube 09/03/22 Right Pleural 5 days         Urethral Catheter 09/05/22 0900 3 days                  Significant Labs:    CBC/Anemia Profile:  Recent Labs   Lab 09/07/22  0400 09/08/22  0309   WBC 11.08 10.27   HGB 9.7* 9.7*   HCT 30.5* 30.1*   PLT 68*  70*   MCV 89 90   RDW 19.7* 19.1*          Chemistries:  Recent Labs   Lab 09/07/22  0400 09/08/22  0309   * 136   K 4.0 4.2    109   CO2 20* 20*   BUN 40* 40*   CREATININE 1.0 1.2   CALCIUM 7.4* 7.8*   ALBUMIN 1.7* 2.3*   PROT 5.9* 6.0   BILITOT 6.3* 6.6*   ALKPHOS 92 82   ALT 33 32   AST 64* 61*   MG 2.2 2.0   PHOS 3.3 3.6         All pertinent labs within the past 24 hours have been reviewed.    Significant Imaging:  I have reviewed all pertinent imaging results/findings within the past 24 hours.

## 2022-09-08 NOTE — PLAN OF CARE
[facility]  Discharge Reassessment    Primary Care Provider: No primary care provider on file.    Expected Discharge Date: 9/12/2022    Patient not medically ready to discharge per MD.    Reassessment (most recent)       Discharge Reassessment - 09/08/22 1330          Discharge Reassessment    Assessment Type Discharge Planning Reassessment     Did the patient's condition or plan change since previous assessment? Yes     Discharge Plan discussed with: Parent(s)     Name(s) and Number(s) Gloria Wilson (Mother) [phone]     Discharge Plan A Home with family     Discharge Plan B Home     DME Needed Upon Discharge  other (see comments)   TBD    Discharge Barriers Identified Underinsured     Why the patient remains in the hospital Requires continued medical care        Post-Acute Status    Discharge Delays None known at this time                     Nevin Magana RN     [phone]

## 2022-09-08 NOTE — ASSESSMENT & PLAN NOTE
Patient with Hypoxic Respiratory failure which is Acute.  he is not on home oxygen. Supplemental oxygen was provided and noted-  .   Signs/symptoms of respiratory failure include- tachypnea, increased work of breathing and respiratory distress. Contributing diagnoses includes - Pleural effusion, Pneumonia and pneumothorax Labs and images were reviewed. Patient Has not had a recent ABG. 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 CT imaging concerning for chest tube in lung parenchyma vs lung re-expansion around tube. CTS evaluation with recs for stabilization prior to any intervention - now pending repeat CT  --continue dex, remdesivir completed  --minimal residual ptx on chest CT.   --f/u blood and sputum cx; f/u results & tailor abx as appropriate - vancomycin discontinued 9/7, pip-tazo discontinued 9/8  --f/u pleural fluid studies - will add on LDH, glucose, protein - c/w transudative process likely hepatohydrothorax  - given diarrhea, checking legionella urine antigen - pending

## 2022-09-08 NOTE — PROGRESS NOTES
[facility] - Cardiac Medical [facility]  Critical Care Medicine  Progress Note    Patient Name: Porfirio Wilson  MRN: [id number]  Admission Date: 9/3/2022  Hospital Length of Stay: 5 days  Code Status: Full Code  Attending Provider: Arthur Douglass MD  Primary Care Provider: No primary care provider on file.   Principal Problem: Acute hypoxemic respiratory failure    Subjective:     HPI:  Patient is a 36 y.o. male with significant past medical history of ETOH cirrhosis (complicated by presence of esophageal varices s/p band ligation), bipolar d/o and pancreatitis presented to [facility] on 9/3 complaining of shortness of breath x2 hours with associated fever/chills, cough and diaphoresis. Patient was intubated in the ED due to degree of respiratory distress. Cxry showed right pleural effusion and pneumothorax and focal consolidation on the left. He was found to be Covid positive. Patient had a right side chest tube placed. Labs at OSH significant for leukocytosis of 16, procal 0.46, lactic 5, , INR 2.1, negative troponin, ETOH negative and ammonia 86.  Pt was tx w/ levaquin & flagyl, as well as given IVF & vit K for coagulopathy associated w/ liver dz. Pt is currently requiring levo for map > 65. Last reported ETOH drink ~ 6 weeks ago.     Patient has been transferred to [facility] for higher level of care and Hepatology services.           Hospital/ICU Course:  Pt admitted to [facility] evening of 9/4 for acute hypoxemic respiratory failure and decompensated cirrhosis. Cultures repeated and pt maintained on mechanical ventilation/levo as needed for map > 65. Pt started on dex/remdesivir for COVID. Completed 4 days of abx for possible superimposed pneumonia. CT chest shows multifocal pna, large right side pleural effusion. Abx briefly broadened due to hypothermia - now discontinued due to euthermia, negative culture data. There is some concern that the chest tube placed at OSH may be in the lung  parenchyma vs enveloped by re-expanding lung - evaluated by CTS who recommend medical stabilization before any intervention. Chest tube with continued high output - now clamped and pending repeat CT for evaluation of position. Extubated and stable on RA 9/7. Hepatology evaluated and patient will not be considered for transplant due to continued EtOH use.      Interval History/Significant Events: Extubated yesterday. Afebrile and stable overnight on RA. Having bowel movements regularly. Alert and awake in bed, fully conversant. No SOB, denies fevers or chills. No other acute complaint voiced.    Review of Systems   Constitutional:  Negative for chills and fever.   HENT:  Negative for sinus pain and trouble swallowing.    Eyes:  Negative for pain and redness.   Respiratory:  Negative for chest tightness and shortness of breath.    Cardiovascular:  Negative for palpitations and leg swelling.   Gastrointestinal:  Negative for blood in stool and constipation.   Genitourinary:  Negative for flank pain and frequency.   Musculoskeletal:  Negative for back pain and joint swelling.   Skin:  Positive for color change. Negative for wound.   Neurological:  Negative for light-headedness and headaches.   Psychiatric/Behavioral:  Negative for agitation and confusion.    Objective:     Vital Signs (Most Recent):  Temp: 98.7 °F (37.1 °C) (09/08/22 1100)  Pulse: (!) 52 (09/08/22 1200)  Resp: 12 (09/08/22 1200)  BP: (!) 93/57 (09/08/22 1200)  SpO2: 99 % (09/08/22 1200)   Vital Signs (24h Range):  Temp:  [97.5 °F (36.4 °C)-98.7 °F (37.1 °C)] 98.7 °F (37.1 °C)  Pulse:  [37-60] 52  Resp:  [12-36] 12  SpO2:  [90 %-100 %] 99 %  BP: ()/(50-69) 93/57   Weight: 73.2 kg (161 lb 6 oz)  Body mass index is 21.89 kg/m².      Intake/Output Summary (Last 24 hours) at 9/8/2022 1322  Last data filed at 9/8/2022 1200  Gross per 24 hour   Intake 1584.13 ml   Output 2630 ml   Net -1045.87 ml         Physical Exam  Vitals and nursing note reviewed.    Constitutional:       Appearance: Normal appearance. He is not ill-appearing.   HENT:      Head: Normocephalic and atraumatic.      Right Ear: External ear normal.      Left Ear: External ear normal.      Nose: Nose normal.      Mouth/Throat:      Mouth: Mucous membranes are moist.      Pharynx: Oropharynx is clear.   Eyes:      General: Scleral icterus present.      Pupils: Pupils are equal, round, and reactive to light.   Cardiovascular:      Rate and Rhythm: Regular rhythm. Bradycardia present.      Pulses: Normal pulses.      Heart sounds: Normal heart sounds. No murmur heard.  Pulmonary:      Effort: Pulmonary effort is normal.      Breath sounds: Normal breath sounds.      Comments: R surgical chest tube, dressing c/d/i  Abdominal:      General: Bowel sounds are normal. There is no distension.      Palpations: Abdomen is soft.      Tenderness: There is no abdominal tenderness.   Musculoskeletal:         General: No swelling or deformity.      Cervical back: Normal range of motion and neck supple.      Right lower leg: No edema.      Left lower leg: No edema.   Skin:     General: Skin is dry.      Capillary Refill: Capillary refill takes less than 2 seconds.      Coloration: Skin is jaundiced.   Neurological:      General: No focal deficit present.      Mental Status: He is alert and oriented to person, place, and time.       Vents:  Vent Mode: Spont (09/07/22 0930)  Set Rate: 12 BPM (09/07/22 0300)  Vt Set: 470 mL (09/07/22 0300)  Pressure Support: 8 cmH20 (09/07/22 0930)  PEEP/CPAP: 5 cmH20 (09/07/22 0930)  Oxygen Concentration (%): 40 (09/07/22 0930)  Peak Airway Pressure: 13 cmH2O (09/07/22 0930)  Plateau Pressure: 16 cmH20 (09/07/22 0930)  Total Ve: 4.65 mL (09/07/22 0930)  Negative Inspiratory Force (cm H2O): -21 (09/07/22 0930)  F/VT Ratio<105 (RSBI): (!) 61.54 (09/07/22 0735)  Lines/Drains/Airways       Central Venous Catheter Line  Duration             Percutaneous Central Line Insertion/Assessment -  Triple Lumen  09/03/22 1500 right subclavian 4 days              Drain  Duration                  Chest Tube 09/03/22 Right Pleural 5 days         Urethral Catheter 09/05/22 0900 3 days                  Significant Labs:    CBC/Anemia Profile:  Recent Labs   Lab 09/07/22  0400 09/08/22  0309   WBC 11.08 10.27   HGB 9.7* 9.7*   HCT 30.5* 30.1*   PLT 68* 70*   MCV 89 90   RDW 19.7* 19.1*          Chemistries:  Recent Labs   Lab 09/07/22  0400 09/08/22  0309   * 136   K 4.0 4.2    109   CO2 20* 20*   BUN 40* 40*   CREATININE 1.0 1.2   CALCIUM 7.4* 7.8*   ALBUMIN 1.7* 2.3*   PROT 5.9* 6.0   BILITOT 6.3* 6.6*   ALKPHOS 92 82   ALT 33 32   AST 64* 61*   MG 2.2 2.0   PHOS 3.3 3.6         All pertinent labs within the past 24 hours have been reviewed.    Significant Imaging:  I have reviewed all pertinent imaging results/findings within the past 24 hours.      ABG  Recent Labs   Lab 09/07/22  0507   PH 7.411   PO2 44*   PCO2 32.7*   HCO3 20.8*   BE -4     Assessment/Plan:     Neuro  Seizure  --reported to have full tonic-clonic seizure like activity lasting ~ 1 min and resolved spontaneously at onset of transfer from OSH per EMS  --head CT negative for acute intracranial abnormality   --if occurs again, will obtain EEG    ENT  Oropharyngeal bleeding  --~ 100cc blood from oropharynx night of transfer  --no blood from ETT and no blood from OGT  --? If pt bit tongue during above seizure like activity  --continue daily ppi; if concern develops for GIB will change to IV protonix bid & consult GI       Pulmonary  * Acute hypoxemic respiratory failure  Patient with Hypoxic Respiratory failure which is Acute.  he is not on home oxygen. Supplemental oxygen was provided and noted-  .   Signs/symptoms of respiratory failure include- tachypnea, increased work of breathing and respiratory distress. Contributing diagnoses includes - Pleural effusion, Pneumonia and pneumothorax Labs and images were reviewed. Patient Has not  had a recent ABG. 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 CT imaging concerning for chest tube in lung parenchyma vs lung re-expansion around tube. CTS evaluation with recs for stabilization prior to any intervention - now pending repeat CT  --continue dex, remdesivir completed  --minimal residual ptx on chest CT.   --f/u blood and sputum cx; f/u results & tailor abx as appropriate - vancomycin discontinued 9/7, pip-tazo discontinued 9/8  --f/u pleural fluid studies - will add on LDH, glucose, protein - c/w transudative process likely hepatohydrothorax  - given diarrhea, checking legionella urine antigen - pending    Renal/  Urinary retention  Patient reports no history of retention. S/p ashby placement 9/5 by urology with clear urine output.     GI  Decompensated hepatic cirrhosis  2/2 alcoholic cirrhosis. C/b variceal hemorrhage and possible HE. 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 Hepatology with reccs for medical stabilization prior to eval for possible transplant.    MELD-Na score: 22 at 9/7/2022  4:00 AM  MELD score: 21 at 9/7/2022  4:00 AM  Calculated from:  Serum Creatinine: 1.0 mg/dL at 9/7/2022  4:00 AM  Serum Sodium: 135 mmol/L at 9/7/2022  4:00 AM  Total Bilirubin: 6.3 mg/dL at 9/7/2022  4:00 AM  INR(ratio): 1.9 at 9/7/2022  4:00 AM  Age: 37 years    --OSH ammonia 87; continue lactulose - no BMs x 2 days but with diarrhea for past 2 days - holding lactulose  --f/u PETH  --holding home propranolol in setting of shock requiring vasopressors - can resume as clinically  appropriate  - adding lasix and spironolactone for continued chest tube drainage 2/2 hepatic hydrothorax  - giving albumin infusion for volume loss 2/2 hydrothorax (equivalent to large volume paracentesis)  --Hepatology consult, appreciate assistance - patient not a transplant candidate at this time due to continued alcohol use       Critical Care Daily Checklist:    A: Awake: RASS Goal/Actual Goal: RASS Goal: 0-->alert and calm  Actual: [place] Agitation Sedation Scale (RASS): Alert and calm   B: Spontaneous Breathing Trial Performed? Spon. Breathing Trial Initiated?: Initiated (09/07/22 0930)   C: SAT & SBT Coordinated?  N/A                      D: Delirium: CAM-ICU Overall CAM-ICU: Negative   E: Early Mobility Performed? Yes   F: Feeding Goal:    Status:     Current Diet Order   Procedures    Diet Adult Regular (IDDSI Level 7)      AS: Analgesia/Sedation N/A   T: Thromboembolic Prophylaxis lovenox   H: HOB > 300 Yes   U: Stress Ulcer Prophylaxis (if needed) PPI   G: Glucose Control 100s   B: Bowel Function Stool Occurrence: 1   I: Indwelling Catheter (Lines & Hawthorne) Necessity Hawthorne, PIV, R subclavian CVC   D: De-escalation of Antimicrobials/Pharmacotherapies On rifaximin    Plan for the day/ETD Step down    Code Status:  Family/Goals of Care: Full Code  Step down        Critical care was time spent personally by me on the following activities: development of treatment plan with patient or surrogate and bedside caregivers, discussions with consultants, evaluation of patient's response to treatment, examination of patient, ordering and performing treatments and interventions, ordering and review of laboratory studies, ordering and review of radiographic studies, pulse oximetry, re-evaluation of patient's condition. This critical care time did not overlap with that of any other provider or involve time for any procedures.     Abdiaziz Garay MD  Critical Care Medicine  [facility] - Cardiac Medical ICU

## 2022-09-09 LAB
ALBUMIN SERPL BCP-MCNC: 2.1 G/DL (ref 3.5–5.2)
ALP SERPL-CCNC: 82 U/L (ref 55–135)
ALT SERPL W/O P-5'-P-CCNC: 41 U/L (ref 10–44)
ANION GAP SERPL CALC-SCNC: 7 MMOL/L (ref 8–16)
AST SERPL-CCNC: 67 U/L (ref 10–40)
BACTERIA BLD CULT: NORMAL
BACTERIA BLD CULT: NORMAL
BASOPHILS # BLD AUTO: 0.01 K/UL (ref 0–0.2)
BASOPHILS NFR BLD: 0.2 % (ref 0–1.9)
BILIRUB SERPL-MCNC: 5.9 MG/DL (ref 0.1–1)
BUN SERPL-MCNC: 42 MG/DL (ref 6–20)
CALCIUM SERPL-MCNC: 7.5 MG/DL (ref 8.7–10.5)
CHLORIDE SERPL-SCNC: 106 MMOL/L (ref 95–110)
CO2 SERPL-SCNC: 19 MMOL/L (ref 23–29)
CREAT SERPL-MCNC: 1.1 MG/DL (ref 0.5–1.4)
DIFFERENTIAL METHOD: ABNORMAL
EOSINOPHIL # BLD AUTO: 0 K/UL (ref 0–0.5)
EOSINOPHIL NFR BLD: 0 % (ref 0–8)
ERYTHROCYTE [DISTWIDTH] IN BLOOD BY AUTOMATED COUNT: 18.4 % (ref 11.5–14.5)
EST. GFR  (NO RACE VARIABLE): >60 ML/MIN/1.73 M^2
GLUCOSE SERPL-MCNC: 139 MG/DL (ref 70–110)
HCT VFR BLD AUTO: 29.9 % (ref 40–54)
HGB BLD-MCNC: 9.7 G/DL (ref 14–18)
HYPOCHROMIA BLD QL SMEAR: ABNORMAL
IMM GRANULOCYTES # BLD AUTO: 0.03 K/UL (ref 0–0.04)
IMM GRANULOCYTES NFR BLD AUTO: 0.5 % (ref 0–0.5)
INR PPP: 1.9 (ref 0.8–1.2)
L PNEUMO AG UR QL IA: NEGATIVE
LACTATE SERPL-SCNC: 2.9 MMOL/L (ref 0.5–2.2)
LYMPHOCYTES # BLD AUTO: 0.5 K/UL (ref 1–4.8)
LYMPHOCYTES NFR BLD: 7 % (ref 18–48)
MAGNESIUM SERPL-MCNC: 1.8 MG/DL (ref 1.6–2.6)
MCH RBC QN AUTO: 29 PG (ref 27–31)
MCHC RBC AUTO-ENTMCNC: 32.4 G/DL (ref 32–36)
MCV RBC AUTO: 90 FL (ref 82–98)
MONOCYTES # BLD AUTO: 0.3 K/UL (ref 0.3–1)
MONOCYTES NFR BLD: 4.6 % (ref 4–15)
NEUTROPHILS # BLD AUTO: 5.7 K/UL (ref 1.8–7.7)
NEUTROPHILS NFR BLD: 87.7 % (ref 38–73)
NRBC BLD-RTO: 0 /100 WBC
PHOSPHATE SERPL-MCNC: 3.2 MG/DL (ref 2.7–4.5)
PLATELET # BLD AUTO: 38 K/UL (ref 150–450)
PLATELET BLD QL SMEAR: ABNORMAL
PMV BLD AUTO: ABNORMAL FL (ref 9.2–12.9)
POTASSIUM SERPL-SCNC: 3.8 MMOL/L (ref 3.5–5.1)
PROT SERPL-MCNC: 5.7 G/DL (ref 6–8.4)
PROTHROMBIN TIME: 19.5 SEC (ref 9–12.5)
RBC # BLD AUTO: 3.34 M/UL (ref 4.6–6.2)
SODIUM SERPL-SCNC: 132 MMOL/L (ref 136–145)
WBC # BLD AUTO: 6.47 K/UL (ref 3.9–12.7)

## 2022-09-09 PROCEDURE — 63600175 PHARM REV CODE 636 W HCPCS: Performed by: NURSE PRACTITIONER

## 2022-09-09 PROCEDURE — 25000003 PHARM REV CODE 250: Performed by: STUDENT IN AN ORGANIZED HEALTH CARE EDUCATION/TRAINING PROGRAM

## 2022-09-09 PROCEDURE — 85610 PROTHROMBIN TIME: CPT | Performed by: NURSE PRACTITIONER

## 2022-09-09 PROCEDURE — 99232 PR SUBSEQUENT HOSPITAL CARE,LEVL II: ICD-10-PCS | Mod: ,,, | Performed by: HOSPITALIST

## 2022-09-09 PROCEDURE — 83605 ASSAY OF LACTIC ACID: CPT | Performed by: HOSPITALIST

## 2022-09-09 PROCEDURE — 63600175 PHARM REV CODE 636 W HCPCS: Mod: JG | Performed by: HOSPITALIST

## 2022-09-09 PROCEDURE — 97162 PT EVAL MOD COMPLEX 30 MIN: CPT

## 2022-09-09 PROCEDURE — 80053 COMPREHEN METABOLIC PANEL: CPT | Performed by: NURSE PRACTITIONER

## 2022-09-09 PROCEDURE — 20600001 HC STEP DOWN PRIVATE ROOM

## 2022-09-09 PROCEDURE — 27000207 HC ISOLATION

## 2022-09-09 PROCEDURE — 25000003 PHARM REV CODE 250

## 2022-09-09 PROCEDURE — 63600175 PHARM REV CODE 636 W HCPCS: Performed by: STUDENT IN AN ORGANIZED HEALTH CARE EDUCATION/TRAINING PROGRAM

## 2022-09-09 PROCEDURE — 25000003 PHARM REV CODE 250: Performed by: HOSPITALIST

## 2022-09-09 PROCEDURE — 83735 ASSAY OF MAGNESIUM: CPT | Performed by: NURSE PRACTITIONER

## 2022-09-09 PROCEDURE — 85025 COMPLETE CBC W/AUTO DIFF WBC: CPT | Performed by: NURSE PRACTITIONER

## 2022-09-09 PROCEDURE — 84100 ASSAY OF PHOSPHORUS: CPT | Performed by: NURSE PRACTITIONER

## 2022-09-09 PROCEDURE — P9047 ALBUMIN (HUMAN), 25%, 50ML: HCPCS | Mod: JG | Performed by: HOSPITALIST

## 2022-09-09 PROCEDURE — 99232 SBSQ HOSP IP/OBS MODERATE 35: CPT | Mod: ,,, | Performed by: HOSPITALIST

## 2022-09-09 PROCEDURE — 27000221 HC OXYGEN, UP TO 24 HOURS

## 2022-09-09 PROCEDURE — 36415 COLL VENOUS BLD VENIPUNCTURE: CPT | Performed by: HOSPITALIST

## 2022-09-09 PROCEDURE — 97530 THERAPEUTIC ACTIVITIES: CPT

## 2022-09-09 PROCEDURE — 94761 N-INVAS EAR/PLS OXIMETRY MLT: CPT

## 2022-09-09 PROCEDURE — 25000003 PHARM REV CODE 250: Performed by: INTERNAL MEDICINE

## 2022-09-09 RX ORDER — CIPROFLOXACIN 2 MG/ML
400 INJECTION, SOLUTION INTRAVENOUS
Status: DISCONTINUED | OUTPATIENT
Start: 2022-09-09 | End: 2022-09-09

## 2022-09-09 RX ORDER — ALBUMIN HUMAN 250 G/1000ML
25 SOLUTION INTRAVENOUS EVERY 12 HOURS
Status: DISCONTINUED | OUTPATIENT
Start: 2022-09-09 | End: 2022-09-13

## 2022-09-09 RX ADMIN — DEXAMETHASONE SODIUM PHOSPHATE 6 MG: 4 INJECTION INTRA-ARTICULAR; INTRALESIONAL; INTRAMUSCULAR; INTRAVENOUS; SOFT TISSUE at 08:09

## 2022-09-09 RX ADMIN — LACTULOSE 20 G: 20 SOLUTION ORAL at 08:09

## 2022-09-09 RX ADMIN — FOLIC ACID 1 MG: 1 TABLET ORAL at 08:09

## 2022-09-09 RX ADMIN — RIFAXIMIN 550 MG: 550 TABLET ORAL at 08:09

## 2022-09-09 RX ADMIN — MUPIROCIN: 20 OINTMENT TOPICAL at 09:09

## 2022-09-09 RX ADMIN — PIPERACILLIN SODIUM AND TAZOBACTAM SODIUM 4.5 G: 4; .5 INJECTION, POWDER, LYOPHILIZED, FOR SOLUTION INTRAVENOUS at 11:09

## 2022-09-09 RX ADMIN — ALBUMIN (HUMAN) 25 G: 0.25 INJECTION, SOLUTION INTRAVENOUS at 08:09

## 2022-09-09 RX ADMIN — LACTULOSE 20 G: 20 SOLUTION ORAL at 03:09

## 2022-09-09 RX ADMIN — SODIUM CHLORIDE: 9 INJECTION, SOLUTION INTRAVENOUS at 06:09

## 2022-09-09 RX ADMIN — SPIRONOLACTONE 100 MG: 100 TABLET ORAL at 08:09

## 2022-09-09 RX ADMIN — PIPERACILLIN SODIUM AND TAZOBACTAM SODIUM 4.5 G: 4; .5 INJECTION, POWDER, LYOPHILIZED, FOR SOLUTION INTRAVENOUS at 08:09

## 2022-09-09 RX ADMIN — Medication 100 MG: at 08:09

## 2022-09-09 RX ADMIN — MUPIROCIN: 20 OINTMENT TOPICAL at 08:09

## 2022-09-09 RX ADMIN — FUROSEMIDE 40 MG: 10 INJECTION, SOLUTION INTRAMUSCULAR; INTRAVENOUS at 08:09

## 2022-09-09 NOTE — PLAN OF CARE
Problem: Physical Therapy  Goal: Physical Therapy Goal  Description: Goals to be met by:      Patient will increase functional independence with mobility by performin. Supine to sit with Modified [place]  2. Sit to supine with Modified [place]  3. Sit to stand transfer with Modified [place]  4. Gait  x 100 feet with Stand-by Assistance using LRAD or no AD.   5. Ascend/Descend 6 inch curb step with Stand-by Assistance using LRAD or no AD.    Outcome: Ongoing, Progressing   Evaluation completed, initiated plan of care.   Becca Quiroz, PT  2022

## 2022-09-09 NOTE — PROGRESS NOTES
Progress Note  Hospital Medicine      Patient Name: Porfirio Wilson  MRN: [id number]  Admission Date: 9/3/2022    SUBJECTIVE:     Follow-up For:  Acute hypoxemic respiratory failure     Interval history/ROS:   9/9: discussed with CTS - will need to leave chest tube in for now unclamped. 5 L output documented over 24 hours.     HPI:  Patient is a 36 y.o. male with significant past medical history of ETOH cirrhosis (complicated by presence of esophageal varices s/p band ligation), bipolar d/o and pancreatitis presented to [facility] on 9/3 complaining of shortness of breath x2 hours with associated fever/chills, cough and diaphoresis. Patient was intubated in the ED due to degree of respiratory distress. Cxry showed right pleural effusion and pneumothorax and focal consolidation on the left. He was found to be Covid positive. Patient had a right side chest tube placed. Labs at OSH significant for leukocytosis of 16, procal 0.46, lactic 5, , INR 2.1, negative troponin, ETOH negative and ammonia 86.  Pt was tx w/ levaquin & flagyl, as well as given IVF & vit K for coagulopathy associated w/ liver dz. Pt is currently requiring levo for map > 65. Last reported ETOH drink ~ 6 weeks ago.      Patient has been transferred to [facility] for higher level of care and Hepatology services.     OBJECTIVE:     Body mass index is 21.89 kg/m².    Vital Signs Range (Last 24H):  Temp:  [97 °F (36.1 °C)-98.6 °F (37 °C)]   Pulse:  [55-82]   Resp:  [12-35]   BP: ()/(55-64)   SpO2:  [90 %-100 %]     I & O (Last 24H):    Intake/Output Summary (Last 24 hours) at 9/9/2022 1520  Last data filed at 9/9/2022 1429  Gross per 24 hour   Intake 847.44 ml   Output 2640 ml   Net -1792.56 ml        Physical Exam:  Vitals and nursing note reviewed.   Constitutional:       Appearance: Normal appearance. He is not ill-appearing.   HENT:      Head: Normocephalic and atraumatic.      Right Ear: External ear normal.      Left  Ear: External ear normal.      Nose: Nose normal.      Mouth/Throat:      Mouth: Mucous membranes are moist.      Pharynx: Oropharynx is clear.   Eyes:      General: Scleral icterus present.      Pupils: Pupils are equal, round, and reactive to light.   Cardiovascular:      Rate and Rhythm: Regular rhythm. Bradycardia present.      Pulses: Normal pulses.      Heart sounds: Normal heart sounds. No murmur heard.  Pulmonary:      Effort: Pulmonary effort is normal.      Breath sounds: Normal breath sounds.      Comments: R surgical chest tube, dressing c/d/i  Abdominal:      General: Bowel sounds are normal. There is no distension.      Palpations: Abdomen is soft.      Tenderness: There is no abdominal tenderness.   Musculoskeletal:         General: No swelling or deformity.      Cervical back: Normal range of motion and neck supple.      Right lower leg: No edema.      Left lower leg: No edema.   Skin:     General: Skin is dry.      Capillary Refill: Capillary refill takes less than 2 seconds.      Coloration: Skin is jaundiced.   Neurological:      General: No focal deficit present.      Mental Status: He is alert and oriented to person, place, and time.        Recent Labs   Lab 09/07/22  0400 09/08/22  0309 09/09/22  0321   * 136 132*   K 4.0 4.2 3.8    109 106   CO2 20* 20* 19*   BUN 40* 40* 42*   CREATININE 1.0 1.2 1.1   * 114* 139*   CALCIUM 7.4* 7.8* 7.5*   MG 2.2 2.0 1.8   PHOS 3.3 3.6 3.2     Recent Labs   Lab 09/07/22  0400 09/08/22  0309 09/09/22  0321   ALKPHOS 92 82 82   ALT 33 32 41   AST 64* 61* 67*   ALBUMIN 1.7* 2.3* 2.1*   PROT 5.9* 6.0 5.7*   BILITOT 6.3* 6.6* 5.9*   INR 1.9* 2.0* 1.9*       Recent Labs   Lab 09/04/22  0000 09/04/22  0338 09/07/22  0400 09/08/22  0309 09/09/22  0321   WBC  --    < > 11.08 10.27 6.47   HGB  --    < > 9.7* 9.7* 9.7*   HCT  --    < > 30.5* 30.1* 29.9*   PLT  --    < > 68* 70* 38*   GRAN  --    < > 88.6*  9.8* 88.3*  9.1* 87.7*  5.7   SEGS 53  --    --   --   --    LYMPH  --    < > 6.7*  0.7* 7.1*  0.7* 7.0*  0.5*   LYMPHS 25  --   --   --   --    MONO  --    < > 4.2  0.5 4.2  0.4 4.6  0.3    < > = values in this interval not displayed.       No results for input(s): POCTGLUCOSE in the last 168 hours.    Recent Labs   Lab 09/03/22  2354   TROPONINI 0.044*       Diagnostic Results:  Labs: Reviewed    dexamethasone, 6 mg, Intravenous, Daily  folic acid, 1 mg, Oral, Daily  furosemide (LASIX) injection, 40 mg, Intravenous, Daily  lactulose, 20 g, Oral, TID  mupirocin, , Nasal, BID  piperacillin-tazobactam (ZOSYN) IVPB, 4.5 g, Intravenous, Q8H  rifAXImin, 550 mg, Oral, BID  spironolactone, 100 mg, Oral, Daily  thiamine, 100 mg, Oral, Daily        As Needed acetaminophen, sodium chloride 0.9%    ASSESSMENT/PLAN:     Assessment: Porfirio Wilson is a 37 y.o. male here with:     Active Hospital Problems    Diagnosis  POA    *Acute hypoxemic respiratory failure [J96.01]  Yes    Urinary retention [R33.9]  Yes    Oropharyngeal bleeding [J39.2]  No    Seizure [R56.9]  Yes    Decompensated hepatic cirrhosis [K72.90, K74.60]  Yes      Resolved Hospital Problems   No resolved problems to display.        Plan:     Seizure  --reported to have full tonic-clonic seizure like activity lasting ~ 1 min and resolved spontaneously at onset of transfer from OSH per EMS  --head CT negative for acute intracranial abnormality   --if occurs again, will obtain EEG    Oropharyngeal bleeding  --~ 100cc blood from oropharynx night of transfer  --no blood from ETT and no blood from OGT  --? If pt bit tongue during above seizure like activity  --continue daily ppi; if concern develops for GIB will change to IV protonix bid & consult GI      Acute hypoxemic respiratory failure  Patient with Hypoxic Respiratory failure which is Acute.  he is not on home oxygen. Supplemental oxygen was provided and noted-  .   Signs/symptoms of respiratory failure include- tachypnea, increased work of breathing  and respiratory distress. Contributing diagnoses includes - Pleural effusion, Pneumonia and pneumothorax Labs and images were reviewed. Patient Has not had a recent ABG. 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 CT imaging concerning for chest tube in lung parenchyma vs lung re-expansion around tube. CTS evaluation with recs for stabilization prior to any intervention    --continue dex, remdesivir completed  --minimal residual ptx on chest CT.   --f/u blood and sputum cx; f/u results & tailor abx as appropriate - vancomycin discontinued 9/7, pip-tazo discontinued 9/8  --f/u pleural fluid studies - will add on LDH, glucose, protein - c/w transudative process likely hepatohydrothorax  - given diarrhea, checking legionella urine antigen - pending  - 9/9: CT reviewed, discussed with CTS - tube to remain in place unclamped    Decompensated hepatic cirrhosis  2/2 alcoholic cirrhosis. C/b variceal hemorrhage and possible HE. 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 Hepatology with reccs for medical stabilization prior to eval for possible transplant.     MELD-Na score: 22 at 9/7/2022  4:00 AM  MELD score: 21 at 9/7/2022  4:00 AM  Calculated from:  Serum Creatinine: 1.0 mg/dL at 9/7/2022  4:00 AM  Serum Sodium: 135 mmol/L at 9/7/2022  4:00 AM  Total Bilirubin: 6.3 mg/dL at 9/7/2022  4:00 AM  INR(ratio): 1.9 at 9/7/2022  4:00 AM  Age: 37 years     --OSH ammonia 87; continue lactulose - no BMs x 2 days but with diarrhea for past 2 days - holding lactulose  --f/u PETH  --holding home propranolol in setting  of shock requiring vasopressors - can resume as clinically appropriate  - adding lasix and spironolactone for continued chest tube drainage 2/2 hepatic hydrothorax  - giving albumin infusion for volume loss 2/2 hydrothorax (equivalent to large volume paracentesis)  --Hepatology consult, appreciate assistance - patient not a transplant candidate at this time due to continued alcohol use  - meld may preclude TIPS candidacy          HIGH RISK CONDITION(S):  Patient has a condition that poses threat to life and bodily function: decompensated liver disease        Tatiana Palmer MD

## 2022-09-09 NOTE — PLAN OF CARE
Problem: Adult Inpatient Plan of Care  Goal: Plan of Care Review  Outcome: Ongoing, Progressing  Goal: Absence of Hospital-Acquired Illness or Injury  Outcome: Ongoing, Progressing  Goal: Optimal Comfort and Wellbeing  Outcome: Ongoing, Progressing     Problem: Fall Injury Risk  Goal: Absence of Fall and Fall-Related Injury  Outcome: Ongoing, Progressing     Problem: Skin Injury Risk Increased  Goal: Skin Health and Integrity  Outcome: Ongoing, Progressing

## 2022-09-09 NOTE — PT/OT/SLP EVAL
Physical Therapy Evaluation    Patient Name:  Porfirio Wilson   MRN:  [id number]    Recommendations:     Discharge Recommendations:  rehabilitation facility   Discharge Equipment Recommendations: other (see comments) (TBD pending progress)   Barriers to discharge:  patient below functional baseline    Assessment:     Porfirio Wilson is a 37 y.o. male admitted with a medical diagnosis of Acute hypoxemic respiratory failure.  He presents with the following impairments/functional limitations:  weakness, impaired endurance, impaired functional mobility, gait instability, impaired cardiopulmonary response to activity. The patient is very motivated to participate with therapy and return to [facility]. He showed significant improvement in functional mobility since OT evaluation yesterday 9/9. He performed bed mobility and sit to stand with contact guard assist. Patient found on 3L O2 and satting 97%, upon standing for several seconds, patient's O2 dropped to 78%. Within <10 seconds of seated rest and supplemental O2 increased to 5L, patient's sats increased to 91%-95%. Patient with persistent productive cough in sitting, patient sat for ~10 min. Provided increased education and encouragement for patient to attempt sitting up in bedside chair to promote cardiopulmonary function, however patient states he is too fatigued from coughing to attempt at this time. He is in agreement to ask for RN assist to sit up in chair over the weekend, RN also notified. Patient stood a second rep and took one sidestep to HOB with contact guard assist.  Based on the patient's progress with therapy, motivation to participate in treatment, and prior level of independence, they are an excellent candidate for inpatient rehabilitation and they would benefit from rehab to maximize their functional potential.      Rehab Prognosis: Good; patient would benefit from acute skilled PT services to address these deficits and reach maximum level of function.    Recent  "Surgery: * No surgery found *      Plan:     During this hospitalization, patient to be seen 4 x/week to address the identified rehab impairments via gait training, therapeutic activities, therapeutic exercises, neuromuscular re-education and progress toward the following goals:    Plan of Care Expires:  10/09/22    Subjective     Chief Complaint: "I did this to myself", "My chest was hurting earlier, I told the doctor, but right now I don't have any pain"  Patient/Family Comments/goals: motivated to return home and to PLOF  Pain/Comfort:  Pain Rating 1: 0/10    Patients cultural, spiritual, Mormonism conflicts given the current situation: no    Living Environment:  The patient lives with his mother and sister in a [facility], 1 [address], tub-shower. R handed. Does not drive- reports recent feelings of agoraphobia, he has discussed this with his doctor. Not currently working, .   Prior to admission, patients level of function was independent.  Equipment used at home: none.  DME owned (not currently used): shower chair.  Upon discharge, patient will have assistance from mother and sister.    Objective:     Communicated with RN prior to session.  Patient found HOB elevated with blood pressure cuff, chest tube, peripheral IV, telemetry, pulse ox (continuous)  upon PT entry to room.    General Precautions: Standard, airborne, contact, droplet, fall, aspiration   Orthopedic Precautions:N/A   Braces: N/A  Respiratory Status: Nasal cannula, flow 3 L/min  -3L O2, at rest sats 97%  -3L O2, standing 5 seconds, sats 78%  -5L O2, seated rest break 10 seconds, sats 91-95%  -Returned to 3L at end of session, sats 95-97%  RN alerted    Exams:    Cognitive Exam  Patient is A&O x4 and follows 100% of one -step commands    Fine Motor Coordination   -       WNL     Postural Exam Patient presented with the following abnormalities:    -       Rounded shoulders  -       Forward head  -       Kyphosis  -       Posterior pelvic tilt   "   Sensation    -       Light touch intact PATRIA LE   Skin Integrity/Edema     -       Skin integrity: visibly intact  -       Edema: NA   R LE ROM WNL   R LE Strength 3+/5 hip flexion,4-/5 knee ext/flex, and ankle DF/PF   L LE ROM WNL   L LE Strength  3+/5 hip flexion, 4-/5 knee ext/flex, and ankle DF/PF       Balance   Static Sitting stand by assistance    Dynamic Sitting stand by assistance    Static Standing contact guard assist L HHA   Dynamic Standing       contact guard assist L HHA        Functional Mobility:    Bed Mobility  Supine to Sit on the R side:  contact guard assist   Sit to supine: contact guard assist    Transfers Sit to Stand:  contact guard assist 2 reps from EOB   Gait  Gait Distance: one sidestep to R with L HHA  Assistance Level: contact guard assist   Description: impaired weight shift, short shuffling strides        Therapeutic Activities and Exercises:   Patient educated on role of therapy, goals of session, benefits of out of bed mobility. Patient agreeable to mobilize with therapy.Discussed PT plan of care during hospitalization. Patient educated that they need to call for assistance to mobilize out of bed. Whiteboard updated as appropriate. Patient educated on how their diagnosis impacts their mobility within PT scope of practice.     Patient ed on importance upright posture for cardiopulmonary function and adequate chest expansion, ed on importance of sitting upright in bedside chair for cardiopulmonary function, verbalized understanding.     Patient educated on PT schedule.  Encouraged patient to ambulate, sit up in chair 3x/day to prevent deconditioning during hospitalization. Patient verbalized understanding and agreement not to mobilize without RN assist. Patient in agreement with PT POC, rehab.        Patient safe to transfer with RN assist x1 person, RN alerted. Patient aware to request assist to sit upright in chair over weekend.     AM-PAC 6 CLICK MOBILITY  Total  Score:20     Patient left with bed in chair position with all lines intact, call button in reach, and RN notified.    GOALS:   Multidisciplinary Problems       Physical Therapy Goals          Problem: Physical Therapy    Goal Priority Disciplines Outcome Goal Variances Interventions   Physical Therapy Goal     PT, PT/OT Ongoing, Progressing     Description: Goals to be met by:      Patient will increase functional independence with mobility by performin. Supine to sit with Modified [place]  2. Sit to supine with Modified [place]  3. Sit to stand transfer with Modified [place]  4. Gait  x 100 feet with Stand-by Assistance using LRAD or no AD.   5. Ascend/Descend 6 inch curb step with Stand-by Assistance using LRAD or no AD.                         History:     Past Medical History:   Diagnosis Date    GERD (gastroesophageal reflux disease)     History of alcohol abuse        Past Surgical History:   Procedure Laterality Date    EGD, WITH BANDING OF VARICES         Time Tracking:     PT Received On: 22  PT Start Time: 1021     PT Stop Time: 1051  PT Total Time (min): 30 min     Billable Minutes: Evaluation 10 and Therapeutic Activity 2022

## 2022-09-09 NOTE — PLAN OF CARE
Problem: Adult Inpatient Plan of Care  Goal: Plan of Care Review  Outcome: Ongoing, Progressing  Goal: Patient-Specific Goal (Individualized)  Outcome: Ongoing, Progressing  Goal: Absence of Hospital-Acquired Illness or Injury  Outcome: Ongoing, Progressing  Goal: Optimal Comfort and Wellbeing  Outcome: Ongoing, Progressing     Problem: Fall Injury Risk  Goal: Absence of Fall and Fall-Related Injury  Outcome: Ongoing, Progressing     Problem: Infection  Goal: Absence of Infection Signs and Symptoms  Outcome: Ongoing, Progressing     Problem: Skin Injury Risk Increased  Goal: Skin Health and Integrity  Outcome: Ongoing, Progressing   He has had a fantastic day.  No sign of distress.  His chest tube is is draining clear, thin, yellow fluid.  His appetite seems to be improving.  After removing the ashby he has voided approx 900 ml.  He has talked a lot to me today about his alcohol addiction and his desire to stop drinking.  He has strong family support with his sister and his mom.    Safety precautions remain in place.

## 2022-09-10 LAB
ALBUMIN SERPL BCP-MCNC: 2.7 G/DL (ref 3.5–5.2)
ALP SERPL-CCNC: 91 U/L (ref 55–135)
ALT SERPL W/O P-5'-P-CCNC: 59 U/L (ref 10–44)
ANION GAP SERPL CALC-SCNC: 7 MMOL/L (ref 8–16)
AST SERPL-CCNC: 79 U/L (ref 10–40)
BASOPHILS # BLD AUTO: 0 K/UL (ref 0–0.2)
BASOPHILS NFR BLD: 0 % (ref 0–1.9)
BILIRUB SERPL-MCNC: 6.6 MG/DL (ref 0.1–1)
BUN SERPL-MCNC: 40 MG/DL (ref 6–20)
CALCIUM SERPL-MCNC: 8 MG/DL (ref 8.7–10.5)
CHLORIDE SERPL-SCNC: 102 MMOL/L (ref 95–110)
CO2 SERPL-SCNC: 21 MMOL/L (ref 23–29)
CREAT SERPL-MCNC: 1.2 MG/DL (ref 0.5–1.4)
DIFFERENTIAL METHOD: ABNORMAL
EOSINOPHIL # BLD AUTO: 0 K/UL (ref 0–0.5)
EOSINOPHIL NFR BLD: 0 % (ref 0–8)
ERYTHROCYTE [DISTWIDTH] IN BLOOD BY AUTOMATED COUNT: 17.9 % (ref 11.5–14.5)
EST. GFR  (NO RACE VARIABLE): >60 ML/MIN/1.73 M^2
GLUCOSE SERPL-MCNC: 119 MG/DL (ref 70–110)
HCT VFR BLD AUTO: 30.3 % (ref 40–54)
HGB BLD-MCNC: 9.9 G/DL (ref 14–18)
IMM GRANULOCYTES # BLD AUTO: 0.03 K/UL (ref 0–0.04)
IMM GRANULOCYTES NFR BLD AUTO: 0.4 % (ref 0–0.5)
INR PPP: 1.8 (ref 0.8–1.2)
LYMPHOCYTES # BLD AUTO: 0.5 K/UL (ref 1–4.8)
LYMPHOCYTES NFR BLD: 7.5 % (ref 18–48)
MAGNESIUM SERPL-MCNC: 1.8 MG/DL (ref 1.6–2.6)
MCH RBC QN AUTO: 28.3 PG (ref 27–31)
MCHC RBC AUTO-ENTMCNC: 32.7 G/DL (ref 32–36)
MCV RBC AUTO: 87 FL (ref 82–98)
MONOCYTES # BLD AUTO: 0.4 K/UL (ref 0.3–1)
MONOCYTES NFR BLD: 5.6 % (ref 4–15)
NEUTROPHILS # BLD AUTO: 6 K/UL (ref 1.8–7.7)
NEUTROPHILS NFR BLD: 86.5 % (ref 38–73)
NRBC BLD-RTO: 0 /100 WBC
PHOSPHATE SERPL-MCNC: 3.5 MG/DL (ref 2.7–4.5)
PLATELET # BLD AUTO: 44 K/UL (ref 150–450)
PMV BLD AUTO: ABNORMAL FL (ref 9.2–12.9)
POTASSIUM SERPL-SCNC: 4.2 MMOL/L (ref 3.5–5.1)
PROT SERPL-MCNC: 6.2 G/DL (ref 6–8.4)
PROTHROMBIN TIME: 18.6 SEC (ref 9–12.5)
RBC # BLD AUTO: 3.5 M/UL (ref 4.6–6.2)
SODIUM SERPL-SCNC: 130 MMOL/L (ref 136–145)
WBC # BLD AUTO: 6.94 K/UL (ref 3.9–12.7)

## 2022-09-10 PROCEDURE — 83735 ASSAY OF MAGNESIUM: CPT | Performed by: NURSE PRACTITIONER

## 2022-09-10 PROCEDURE — 27000221 HC OXYGEN, UP TO 24 HOURS

## 2022-09-10 PROCEDURE — 80053 COMPREHEN METABOLIC PANEL: CPT | Performed by: NURSE PRACTITIONER

## 2022-09-10 PROCEDURE — 99232 SBSQ HOSP IP/OBS MODERATE 35: CPT | Mod: ,,, | Performed by: HOSPITALIST

## 2022-09-10 PROCEDURE — 99900035 HC TECH TIME PER 15 MIN (STAT)

## 2022-09-10 PROCEDURE — 25000003 PHARM REV CODE 250: Performed by: HOSPITALIST

## 2022-09-10 PROCEDURE — 85610 PROTHROMBIN TIME: CPT | Performed by: NURSE PRACTITIONER

## 2022-09-10 PROCEDURE — 63600175 PHARM REV CODE 636 W HCPCS: Performed by: STUDENT IN AN ORGANIZED HEALTH CARE EDUCATION/TRAINING PROGRAM

## 2022-09-10 PROCEDURE — 63600175 PHARM REV CODE 636 W HCPCS: Performed by: NURSE PRACTITIONER

## 2022-09-10 PROCEDURE — P9047 ALBUMIN (HUMAN), 25%, 50ML: HCPCS | Mod: JG | Performed by: HOSPITALIST

## 2022-09-10 PROCEDURE — 85025 COMPLETE CBC W/AUTO DIFF WBC: CPT | Performed by: NURSE PRACTITIONER

## 2022-09-10 PROCEDURE — 27000207 HC ISOLATION

## 2022-09-10 PROCEDURE — 36415 COLL VENOUS BLD VENIPUNCTURE: CPT | Performed by: NURSE PRACTITIONER

## 2022-09-10 PROCEDURE — 84100 ASSAY OF PHOSPHORUS: CPT | Performed by: NURSE PRACTITIONER

## 2022-09-10 PROCEDURE — 25000003 PHARM REV CODE 250

## 2022-09-10 PROCEDURE — 25000003 PHARM REV CODE 250: Performed by: STUDENT IN AN ORGANIZED HEALTH CARE EDUCATION/TRAINING PROGRAM

## 2022-09-10 PROCEDURE — 20600001 HC STEP DOWN PRIVATE ROOM

## 2022-09-10 PROCEDURE — 63600175 PHARM REV CODE 636 W HCPCS: Mod: JG | Performed by: HOSPITALIST

## 2022-09-10 PROCEDURE — 94761 N-INVAS EAR/PLS OXIMETRY MLT: CPT

## 2022-09-10 PROCEDURE — 99232 PR SUBSEQUENT HOSPITAL CARE,LEVL II: ICD-10-PCS | Mod: ,,, | Performed by: HOSPITALIST

## 2022-09-10 RX ORDER — METHOCARBAMOL 500 MG/1
500 TABLET, FILM COATED ORAL 4 TIMES DAILY PRN
Status: DISCONTINUED | OUTPATIENT
Start: 2022-09-10 | End: 2022-09-18 | Stop reason: HOSPADM

## 2022-09-10 RX ORDER — TRAMADOL HYDROCHLORIDE 50 MG/1
50 TABLET ORAL EVERY 6 HOURS PRN
Status: DISCONTINUED | OUTPATIENT
Start: 2022-09-10 | End: 2022-09-14

## 2022-09-10 RX ORDER — ALUMINUM HYDROXIDE, MAGNESIUM HYDROXIDE, AND SIMETHICONE 2400; 240; 2400 MG/30ML; MG/30ML; MG/30ML
30 SUSPENSION ORAL EVERY 6 HOURS PRN
Status: DISCONTINUED | OUTPATIENT
Start: 2022-09-10 | End: 2022-09-18 | Stop reason: HOSPADM

## 2022-09-10 RX ADMIN — FUROSEMIDE 40 MG: 10 INJECTION, SOLUTION INTRAMUSCULAR; INTRAVENOUS at 08:09

## 2022-09-10 RX ADMIN — RIFAXIMIN 550 MG: 550 TABLET ORAL at 08:09

## 2022-09-10 RX ADMIN — FOLIC ACID 1 MG: 1 TABLET ORAL at 08:09

## 2022-09-10 RX ADMIN — LACTULOSE 20 G: 20 SOLUTION ORAL at 08:09

## 2022-09-10 RX ADMIN — DEXAMETHASONE SODIUM PHOSPHATE 6 MG: 4 INJECTION INTRA-ARTICULAR; INTRALESIONAL; INTRAMUSCULAR; INTRAVENOUS; SOFT TISSUE at 08:09

## 2022-09-10 RX ADMIN — SPIRONOLACTONE 100 MG: 100 TABLET ORAL at 08:09

## 2022-09-10 RX ADMIN — PIPERACILLIN SODIUM AND TAZOBACTAM SODIUM 4.5 G: 4; .5 INJECTION, POWDER, LYOPHILIZED, FOR SOLUTION INTRAVENOUS at 12:09

## 2022-09-10 RX ADMIN — PIPERACILLIN SODIUM AND TAZOBACTAM SODIUM 4.5 G: 4; .5 INJECTION, POWDER, LYOPHILIZED, FOR SOLUTION INTRAVENOUS at 11:09

## 2022-09-10 RX ADMIN — Medication 100 MG: at 08:09

## 2022-09-10 RX ADMIN — ALBUMIN (HUMAN) 25 G: 0.25 INJECTION, SOLUTION INTRAVENOUS at 09:09

## 2022-09-10 RX ADMIN — ALBUMIN (HUMAN) 25 G: 0.25 INJECTION, SOLUTION INTRAVENOUS at 08:09

## 2022-09-10 RX ADMIN — RIFAXIMIN 550 MG: 550 TABLET ORAL at 09:09

## 2022-09-10 RX ADMIN — PIPERACILLIN SODIUM AND TAZOBACTAM SODIUM 4.5 G: 4; .5 INJECTION, POWDER, LYOPHILIZED, FOR SOLUTION INTRAVENOUS at 03:09

## 2022-09-10 NOTE — PLAN OF CARE
Problem: Adult Inpatient Plan of Care  Goal: Plan of Care Review  Outcome: Ongoing, Progressing  Goal: Patient-Specific Goal (Individualized)  Outcome: Ongoing, Progressing  Goal: Absence of Hospital-Acquired Illness or Injury  Outcome: Ongoing, Progressing     Problem: Infection  Goal: Absence of Infection Signs and Symptoms  Outcome: Ongoing, Progressing     Problem: Skin Injury Risk Increased  Goal: Skin Health and Integrity  Outcome: Ongoing, Progressing  Intervention: Optimize Skin Protection  Flowsheets (Taken 9/10/2022 1833)  Pressure Reduction Techniques: frequent weight shift encouraged  Pressure Reduction Devices: foam padding utilized  Skin Protection:   adhesive use limited   tubing/devices free from skin contact   protective footwear used  Head of Bed (HOB) Positioning: HOB elevated     Problem: Fall Injury Risk  Goal: Absence of Fall and Fall-Related Injury  Outcome: Ongoing, Progressing  Intervention: Promote Injury-Free Environment  Flowsheets (Taken 9/10/2022 1833)  Safety Promotion/Fall Prevention:   assistive device/personal item within reach   bed alarm set   Fall Risk reviewed with patient/family   nonskid shoes/socks when out of bed   instructed to call staff for mobility   side rails raised x 2     POC reviewed. Addres questions and concerns. AAOX4. VVS. Chest tube to water seal. BM X3. Remain on Abx. Poor appetite. RA-Sat>95%. Isolation and Safety  precaution maintained. No complaint of discomfort/pain. Frequent safety checks performed. Call light in reach. Bed in lowest position. [facility]

## 2022-09-10 NOTE — PROGRESS NOTES
Progress Note  Hospital Medicine      Patient Name: Porfirio Wilson  MRN: [id number]  Admission Date: 9/3/2022    SUBJECTIVE:     Follow-up For:  Acute hypoxemic respiratory failure     Interval history/ROS:   9/10: patient on room air during rounding.        9/9: discussed with CTS - will need to leave chest tube in for now unclamped. 5 L output documented over 24 hours.     HPI:  Patient is a 36 y.o. male with significant past medical history of ETOH cirrhosis (complicated by presence of esophageal varices s/p band ligation), bipolar d/o and pancreatitis presented to [facility] on 9/3 complaining of shortness of breath x2 hours with associated fever/chills, cough and diaphoresis. Patient was intubated in the ED due to degree of respiratory distress. Cxry showed right pleural effusion and pneumothorax and focal consolidation on the left. He was found to be Covid positive. Patient had a right side chest tube placed. Labs at OSH significant for leukocytosis of 16, procal 0.46, lactic 5, , INR 2.1, negative troponin, ETOH negative and ammonia 86.  Pt was tx w/ levaquin & flagyl, as well as given IVF & vit K for coagulopathy associated w/ liver dz. Pt is currently requiring levo for map > 65. Last reported ETOH drink ~ 6 weeks ago.      Patient has been transferred to [facility] for higher level of care and Hepatology services.     OBJECTIVE:     Body mass index is 21.89 kg/m².    Vital Signs Range (Last 24H):  Temp:  [97.4 °F (36.3 °C)-97.9 °F (36.6 °C)]   Pulse:  [59-78]   Resp:  [13-32]   BP: ()/(53-62)   SpO2:  [97 %-100 %]     I & O (Last 24H):    Intake/Output Summary (Last 24 hours) at 9/10/2022 1518  Last data filed at 9/10/2022 1400  Gross per 24 hour   Intake 1079.75 ml   Output 2207 ml   Net -1127.25 ml          Physical Exam:  Vitals and nursing note reviewed.   Constitutional:       Appearance: Normal appearance. He is not ill-appearing.   HENT:      Head: Normocephalic and  atraumatic.      Right Ear: External ear normal.      Left Ear: External ear normal.      Nose: Nose normal.      Mouth/Throat:      Mouth: Mucous membranes are moist.      Pharynx: Oropharynx is clear.   Eyes:      General: Scleral icterus present.      Pupils: Pupils are equal, round, and reactive to light.   Cardiovascular:      Rate and Rhythm: Regular rhythm. Bradycardia present.      Pulses: Normal pulses.      Heart sounds: Normal heart sounds. No murmur heard.  Pulmonary:      Effort: Pulmonary effort is normal.      Breath sounds: Normal breath sounds.      Comments: R surgical chest tube, dressing c/d/i  Abdominal:      General: Bowel sounds are normal. There is no distension.      Palpations: Abdomen is soft.      Tenderness: There is no abdominal tenderness.   Musculoskeletal:         General: No swelling or deformity.      Cervical back: Normal range of motion and neck supple.      Right lower leg: No edema.      Left lower leg: No edema.   Skin:     General: Skin is dry.      Capillary Refill: Capillary refill takes less than 2 seconds.      Coloration: Skin is jaundiced.   Neurological:      General: No focal deficit present.      Mental Status: He is alert and oriented to person, place, and time.        Recent Labs   Lab 09/08/22  0309 09/09/22  0321 09/10/22  0714    132* 130*   K 4.2 3.8 4.2    106 102   CO2 20* 19* 21*   BUN 40* 42* 40*   CREATININE 1.2 1.1 1.2   * 139* 119*   CALCIUM 7.8* 7.5* 8.0*   MG 2.0 1.8 1.8   PHOS 3.6 3.2 3.5       Recent Labs   Lab 09/08/22  0309 09/09/22  0321 09/10/22  0714   ALKPHOS 82 82 91   ALT 32 41 59*   AST 61* 67* 79*   ALBUMIN 2.3* 2.1* 2.7*   PROT 6.0 5.7* 6.2   BILITOT 6.6* 5.9* 6.6*   INR 2.0* 1.9* 1.8*         Recent Labs   Lab 09/04/22  0000 09/04/22  0338 09/08/22  0309 09/09/22  0321 09/10/22  0714   WBC  --    < > 10.27 6.47 6.94   HGB  --    < > 9.7* 9.7* 9.9*   HCT  --    < > 30.1* 29.9* 30.3*   PLT  --    < > 70* 38* 44*   GRAN   --    < > 88.3*  9.1* 87.7*  5.7 86.5*  6.0   SEGS 53  --   --   --   --    LYMPH  --    < > 7.1*  0.7* 7.0*  0.5* 7.5*  0.5*   LYMPHS 25  --   --   --   --    MONO  --    < > 4.2  0.4 4.6  0.3 5.6  0.4    < > = values in this interval not displayed.         No results for input(s): POCTGLUCOSE in the last 168 hours.    Recent Labs   Lab 09/03/22  2354   TROPONINI 0.044*         Diagnostic Results:  Labs: Reviewed    albumin human 25%, 25 g, Intravenous, Q12H  dexamethasone, 6 mg, Intravenous, Daily  folic acid, 1 mg, Oral, Daily  furosemide (LASIX) injection, 40 mg, Intravenous, Daily  lactulose, 20 g, Oral, TID  piperacillin-tazobactam (ZOSYN) IVPB, 4.5 g, Intravenous, Q8H  rifAXImin, 550 mg, Oral, BID  spironolactone, 100 mg, Oral, Daily  thiamine, 100 mg, Oral, Daily      As Needed acetaminophen, sodium chloride 0.9%    ASSESSMENT/PLAN:     Assessment: Porfirio Wilson is a 37 y.o. male here with:     Active Hospital Problems    Diagnosis  POA    *Acute hypoxemic respiratory failure [J96.01]  Yes    Urinary retention [R33.9]  Yes    Oropharyngeal bleeding [J39.2]  No    Seizure [R56.9]  Yes    Decompensated hepatic cirrhosis [K72.90, K74.60]  Yes      Resolved Hospital Problems   No resolved problems to display.        Plan:     Seizure  --reported to have full tonic-clonic seizure like activity lasting ~ 1 min and resolved spontaneously at onset of transfer from OSH per EMS  --head CT negative for acute intracranial abnormality   --if occurs again, will obtain EEG    Oropharyngeal bleeding  --~ 100cc blood from oropharynx night of transfer  --no blood from ETT and no blood from OGT  --? If pt bit tongue during above seizure like activity  --continue daily ppi; if concern develops for GIB will change to IV protonix bid & consult GI      Acute hypoxemic respiratory failure  Patient with Hypoxic Respiratory failure which is Acute.  he is not on home oxygen. Supplemental oxygen was provided and noted-  .    Signs/symptoms of respiratory failure include- tachypnea, increased work of breathing and respiratory distress. Contributing diagnoses includes - Pleural effusion, Pneumonia and pneumothorax Labs and images were reviewed. Patient Has not had a recent ABG. 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 CT imaging concerning for chest tube in lung parenchyma vs lung re-expansion around tube. CTS evaluation with recs for stabilization prior to any intervention    --continue dex, remdesivir completed  --minimal residual ptx on chest CT.   --f/u blood and sputum cx; f/u results & tailor abx as appropriate - vancomycin discontinued 9/7, pip-tazo discontinued 9/8  --f/u pleural fluid studies - will add on LDH, glucose, protein - c/w transudative process likely hepatohydrothorax  - given diarrhea, checking legionella urine antigen - pending  - 9/9: CT reviewed, discussed with CTS - tube to remain in place unclamped    Decompensated hepatic cirrhosis  2/2 alcoholic cirrhosis. C/b variceal hemorrhage and possible HE. 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 Hepatology with reccs for medical stabilization prior to eval for possible transplant.     MELD-Na score: 22 at 9/7/2022  4:00 AM  MELD score: 21 at 9/7/2022  4:00 AM  Calculated from:  Serum Creatinine: 1.0 mg/dL at 9/7/2022  4:00 AM  Serum Sodium: 135 mmol/L at 9/7/2022  4:00 AM  Total Bilirubin: 6.3 mg/dL at 9/7/2022  4:00 AM  INR(ratio): 1.9 at 9/7/2022  4:00 AM  Age: 37 years     --OSH ammonia 87; continue lactulose - no BMs x 2 days but with diarrhea  for past 2 days - holding lactulose  --f/u PETH  --holding home propranolol in setting of shock requiring vasopressors - can resume as clinically appropriate  - adding lasix and spironolactone for continued chest tube drainage 2/2 hepatic hydrothorax  - giving albumin infusion for volume loss 2/2 hydrothorax (equivalent to large volume paracentesis)  --Hepatology consult, appreciate assistance - patient not a transplant candidate at this time due to continued alcohol use  - meld may preclude TIPS candidacy          HIGH RISK CONDITION(S):  Patient has a condition that poses threat to life and bodily function: decompensated liver disease        Tatiana Palmer MD

## 2022-09-11 LAB
ALBUMIN SERPL BCP-MCNC: 3.5 G/DL (ref 3.5–5.2)
ALP SERPL-CCNC: 84 U/L (ref 55–135)
ALT SERPL W/O P-5'-P-CCNC: 69 U/L (ref 10–44)
ANION GAP SERPL CALC-SCNC: 7 MMOL/L (ref 8–16)
AST SERPL-CCNC: 79 U/L (ref 10–40)
BASOPHILS # BLD AUTO: 0 K/UL (ref 0–0.2)
BASOPHILS NFR BLD: 0 % (ref 0–1.9)
BILIRUB SERPL-MCNC: 6.9 MG/DL (ref 0.1–1)
BUN SERPL-MCNC: 39 MG/DL (ref 6–20)
CALCIUM SERPL-MCNC: 8.7 MG/DL (ref 8.7–10.5)
CHLORIDE SERPL-SCNC: 99 MMOL/L (ref 95–110)
CO2 SERPL-SCNC: 22 MMOL/L (ref 23–29)
CREAT SERPL-MCNC: 1.2 MG/DL (ref 0.5–1.4)
DIFFERENTIAL METHOD: ABNORMAL
EOSINOPHIL # BLD AUTO: 0 K/UL (ref 0–0.5)
EOSINOPHIL NFR BLD: 0 % (ref 0–8)
ERYTHROCYTE [DISTWIDTH] IN BLOOD BY AUTOMATED COUNT: 17.3 % (ref 11.5–14.5)
EST. GFR  (NO RACE VARIABLE): >60 ML/MIN/1.73 M^2
GLUCOSE SERPL-MCNC: 111 MG/DL (ref 70–110)
HCT VFR BLD AUTO: 31.8 % (ref 40–54)
HGB BLD-MCNC: 10.3 G/DL (ref 14–18)
IMM GRANULOCYTES # BLD AUTO: 0.03 K/UL (ref 0–0.04)
IMM GRANULOCYTES NFR BLD AUTO: 0.5 % (ref 0–0.5)
INR PPP: 1.9 (ref 0.8–1.2)
LYMPHOCYTES # BLD AUTO: 0.4 K/UL (ref 1–4.8)
LYMPHOCYTES NFR BLD: 7 % (ref 18–48)
MAGNESIUM SERPL-MCNC: 2 MG/DL (ref 1.6–2.6)
MCH RBC QN AUTO: 28.9 PG (ref 27–31)
MCHC RBC AUTO-ENTMCNC: 32.4 G/DL (ref 32–36)
MCV RBC AUTO: 89 FL (ref 82–98)
MONOCYTES # BLD AUTO: 0.3 K/UL (ref 0.3–1)
MONOCYTES NFR BLD: 4.6 % (ref 4–15)
NEUTROPHILS # BLD AUTO: 4.9 K/UL (ref 1.8–7.7)
NEUTROPHILS NFR BLD: 87.9 % (ref 38–73)
NRBC BLD-RTO: 0 /100 WBC
PHOSPHATE SERPL-MCNC: 2.8 MG/DL (ref 2.7–4.5)
PLATELET # BLD AUTO: 40 K/UL (ref 150–450)
PMV BLD AUTO: ABNORMAL FL (ref 9.2–12.9)
POTASSIUM SERPL-SCNC: 4.2 MMOL/L (ref 3.5–5.1)
PROT SERPL-MCNC: 6.9 G/DL (ref 6–8.4)
PROTHROMBIN TIME: 19.1 SEC (ref 9–12.5)
RBC # BLD AUTO: 3.57 M/UL (ref 4.6–6.2)
SODIUM SERPL-SCNC: 128 MMOL/L (ref 136–145)
WBC # BLD AUTO: 5.61 K/UL (ref 3.9–12.7)

## 2022-09-11 PROCEDURE — 20600001 HC STEP DOWN PRIVATE ROOM

## 2022-09-11 PROCEDURE — 99232 SBSQ HOSP IP/OBS MODERATE 35: CPT | Mod: ,,, | Performed by: HOSPITALIST

## 2022-09-11 PROCEDURE — 27000207 HC ISOLATION

## 2022-09-11 PROCEDURE — 80053 COMPREHEN METABOLIC PANEL: CPT | Performed by: NURSE PRACTITIONER

## 2022-09-11 PROCEDURE — 36415 COLL VENOUS BLD VENIPUNCTURE: CPT | Performed by: NURSE PRACTITIONER

## 2022-09-11 PROCEDURE — 83735 ASSAY OF MAGNESIUM: CPT | Performed by: NURSE PRACTITIONER

## 2022-09-11 PROCEDURE — 85610 PROTHROMBIN TIME: CPT | Performed by: NURSE PRACTITIONER

## 2022-09-11 PROCEDURE — 85025 COMPLETE CBC W/AUTO DIFF WBC: CPT | Performed by: NURSE PRACTITIONER

## 2022-09-11 PROCEDURE — 63600175 PHARM REV CODE 636 W HCPCS: Performed by: HOSPITALIST

## 2022-09-11 PROCEDURE — P9047 ALBUMIN (HUMAN), 25%, 50ML: HCPCS | Mod: JG | Performed by: HOSPITALIST

## 2022-09-11 PROCEDURE — 25000003 PHARM REV CODE 250

## 2022-09-11 PROCEDURE — 63600175 PHARM REV CODE 636 W HCPCS: Performed by: STUDENT IN AN ORGANIZED HEALTH CARE EDUCATION/TRAINING PROGRAM

## 2022-09-11 PROCEDURE — 25000003 PHARM REV CODE 250: Performed by: HOSPITALIST

## 2022-09-11 PROCEDURE — 84100 ASSAY OF PHOSPHORUS: CPT | Performed by: NURSE PRACTITIONER

## 2022-09-11 PROCEDURE — 99232 PR SUBSEQUENT HOSPITAL CARE,LEVL II: ICD-10-PCS | Mod: ,,, | Performed by: HOSPITALIST

## 2022-09-11 PROCEDURE — 63600175 PHARM REV CODE 636 W HCPCS: Performed by: NURSE PRACTITIONER

## 2022-09-11 RX ORDER — FUROSEMIDE 20 MG/1
20 TABLET ORAL DAILY
Status: DISCONTINUED | OUTPATIENT
Start: 2022-09-12 | End: 2022-09-14

## 2022-09-11 RX ADMIN — RIFAXIMIN 550 MG: 550 TABLET ORAL at 09:09

## 2022-09-11 RX ADMIN — PIPERACILLIN SODIUM AND TAZOBACTAM SODIUM 4.5 G: 4; .5 INJECTION, POWDER, LYOPHILIZED, FOR SOLUTION INTRAVENOUS at 01:09

## 2022-09-11 RX ADMIN — RIFAXIMIN 550 MG: 550 TABLET ORAL at 08:09

## 2022-09-11 RX ADMIN — ALBUMIN (HUMAN) 25 G: 0.25 INJECTION, SOLUTION INTRAVENOUS at 08:09

## 2022-09-11 RX ADMIN — PIPERACILLIN SODIUM AND TAZOBACTAM SODIUM 4.5 G: 4; .5 INJECTION, POWDER, LYOPHILIZED, FOR SOLUTION INTRAVENOUS at 05:09

## 2022-09-11 RX ADMIN — ALBUMIN (HUMAN) 25 G: 0.25 INJECTION, SOLUTION INTRAVENOUS at 09:09

## 2022-09-11 RX ADMIN — DEXAMETHASONE SODIUM PHOSPHATE 6 MG: 4 INJECTION INTRA-ARTICULAR; INTRALESIONAL; INTRAMUSCULAR; INTRAVENOUS; SOFT TISSUE at 08:09

## 2022-09-11 RX ADMIN — FUROSEMIDE 40 MG: 10 INJECTION, SOLUTION INTRAMUSCULAR; INTRAVENOUS at 08:09

## 2022-09-11 RX ADMIN — PIPERACILLIN SODIUM AND TAZOBACTAM SODIUM 4.5 G: 4; .5 INJECTION, POWDER, LYOPHILIZED, FOR SOLUTION INTRAVENOUS at 11:09

## 2022-09-11 RX ADMIN — Medication 100 MG: at 08:09

## 2022-09-11 RX ADMIN — LACTULOSE 20 G: 20 SOLUTION ORAL at 08:09

## 2022-09-11 RX ADMIN — FOLIC ACID 1 MG: 1 TABLET ORAL at 08:09

## 2022-09-11 NOTE — PROGRESS NOTES
Progress Note  Hospital Medicine      Patient Name: Porfirio Wilson  MRN: [id number]  Admission Date: 9/3/2022    SUBJECTIVE:     Follow-up For:  Acute hypoxemic respiratory failure     Interval history/ROS:   9/11l: No acute issues overnight    9/10: patient on room air during rounding.        9/9: discussed with CTS - will need to leave chest tube in for now unclamped. 5 L output documented over 24 hours.     HPI:  Patient is a 36 y.o. male with significant past medical history of ETOH cirrhosis (complicated by presence of esophageal varices s/p band ligation), bipolar d/o and pancreatitis presented to [facility] on 9/3 complaining of shortness of breath x2 hours with associated fever/chills, cough and diaphoresis. Patient was intubated in the ED due to degree of respiratory distress. Cxry showed right pleural effusion and pneumothorax and focal consolidation on the left. He was found to be Covid positive. Patient had a right side chest tube placed. Labs at OSH significant for leukocytosis of 16, procal 0.46, lactic 5, , INR 2.1, negative troponin, ETOH negative and ammonia 86.  Pt was tx w/ levaquin & flagyl, as well as given IVF & vit K for coagulopathy associated w/ liver dz. Pt is currently requiring levo for map > 65. Last reported ETOH drink ~ 6 weeks ago.      Patient has been transferred to [facility] for higher level of care and Hepatology services.     OBJECTIVE:     Body mass index is 21.89 kg/m².    Vital Signs Range (Last 24H):  Temp:  [97 °F (36.1 °C)-98.3 °F (36.8 °C)]   Pulse:  [52-72]   Resp:  [18-34]   BP: ()/(50-59)   SpO2:  [93 %-100 %]     I & O (Last 24H):    Intake/Output Summary (Last 24 hours) at 9/11/2022 1541  Last data filed at 9/11/2022 1000  Gross per 24 hour   Intake 660 ml   Output 70 ml   Net 590 ml          Physical Exam:  Vitals and nursing note reviewed.   Constitutional:       Appearance: Normal appearance. He is not ill-appearing.   HENT:       Head: Normocephalic and atraumatic.      Right Ear: External ear normal.      Left Ear: External ear normal.      Nose: Nose normal.      Mouth/Throat:      Mouth: Mucous membranes are moist.      Pharynx: Oropharynx is clear.   Eyes:      General: Scleral icterus present.      Pupils: Pupils are equal, round, and reactive to light.   Cardiovascular:      Rate and Rhythm: Regular rhythm. Bradycardia present.      Pulses: Normal pulses.      Heart sounds: Normal heart sounds. No murmur heard.  Pulmonary:      Effort: Pulmonary effort is normal.      Breath sounds: Normal breath sounds.      Comments: R surgical chest tube, dressing c/d/i  Abdominal:      General: Bowel sounds are normal. There is no distension.      Palpations: Abdomen is soft.      Tenderness: There is no abdominal tenderness.   Musculoskeletal:         General: No swelling or deformity.      Cervical back: Normal range of motion and neck supple.      Right lower leg: No edema.      Left lower leg: No edema.   Skin:     General: Skin is dry.      Capillary Refill: Capillary refill takes less than 2 seconds.      Coloration: Skin is jaundiced.   Neurological:      General: No focal deficit present.      Mental Status: He is alert and oriented to person, place, and time.        Recent Labs   Lab 09/09/22  0321 09/10/22  0714 09/11/22  0541   * 130* 128*   K 3.8 4.2 4.2    102 99   CO2 19* 21* 22*   BUN 42* 40* 39*   CREATININE 1.1 1.2 1.2   * 119* 111*   CALCIUM 7.5* 8.0* 8.7   MG 1.8 1.8 2.0   PHOS 3.2 3.5 2.8       Recent Labs   Lab 09/09/22  0321 09/10/22  0714 09/11/22  0541   ALKPHOS 82 91 84   ALT 41 59* 69*   AST 67* 79* 79*   ALBUMIN 2.1* 2.7* 3.5   PROT 5.7* 6.2 6.9   BILITOT 5.9* 6.6* 6.9*   INR 1.9* 1.8* 1.9*         Recent Labs   Lab 09/09/22  0321 09/10/22  0714 09/11/22  0541   WBC 6.47 6.94 5.61   HGB 9.7* 9.9* 10.3*   HCT 29.9* 30.3* 31.8*   PLT 38* 44* 40*   GRAN 87.7*  5.7 86.5*  6.0 87.9*  4.9   LYMPH 7.0*   0.5* 7.5*  0.5* 7.0*  0.4*   MONO 4.6  0.3 5.6  0.4 4.6  0.3         No results for input(s): POCTGLUCOSE in the last 168 hours.    No results for input(s): TROPONINI in the last 168 hours.      Diagnostic Results:  Labs: Reviewed    albumin human 25%, 25 g, Intravenous, Q12H  folic acid, 1 mg, Oral, Daily  [START ON 9/12/2022] furosemide, 20 mg, Oral, Daily  lactulose, 20 g, Oral, TID  piperacillin-tazobactam (ZOSYN) IVPB, 4.5 g, Intravenous, Q8H  rifAXImin, 550 mg, Oral, BID  spironolactone, 100 mg, Oral, Daily  thiamine, 100 mg, Oral, Daily      As Needed acetaminophen, aluminum & magnesium hydroxide-simethicone, methocarbamoL, sodium chloride 0.9%, traMADoL    ASSESSMENT/PLAN:     Assessment: Porfirio Wilson is a 37 y.o. male here with:     Active Hospital Problems    Diagnosis  POA    *Acute hypoxemic respiratory failure [J96.01]  Yes    Urinary retention [R33.9]  Yes    Oropharyngeal bleeding [J39.2]  No    Seizure [R56.9]  Yes    Decompensated hepatic cirrhosis [K72.90, K74.60]  Yes      Resolved Hospital Problems   No resolved problems to display.        Plan:     Seizure  --reported to have full tonic-clonic seizure like activity lasting ~ 1 min and resolved spontaneously at onset of transfer from OSH per EMS  --head CT negative for acute intracranial abnormality   --if occurs again, will obtain EEG    Oropharyngeal bleeding  --~ 100cc blood from oropharynx night of transfer  --no blood from ETT and no blood from OGT  --? If pt bit tongue during above seizure like activity  --continue daily ppi; if concern develops for GIB will change to IV protonix bid & consult GI      Acute hypoxemic respiratory failure  Patient with Hypoxic Respiratory failure which is Acute.  he is not on home oxygen. Supplemental oxygen was provided and noted-  .   Signs/symptoms of respiratory failure include- tachypnea, increased work of breathing and respiratory distress. Contributing diagnoses includes - Pleural effusion,  Pneumonia and pneumothorax Labs and images were reviewed. Patient Has not had a recent ABG. 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 CT imaging concerning for chest tube in lung parenchyma vs lung re-expansion around tube. CTS evaluation with recs for stabilization prior to any intervention    --continue dex, remdesivir completed  --minimal residual ptx on chest CT.   --f/u blood and sputum cx; f/u results & tailor abx as appropriate - vancomycin discontinued 9/7, pip-tazo discontinued 9/8  --f/u pleural fluid studies - will add on LDH, glucose, protein - c/w transudative process likely hepatohydrothorax  - given diarrhea, checking legionella urine antigen - pending  - 9/9: CT reviewed, discussed with CTS - tube to remain in place unclamped    Decompensated hepatic cirrhosis  2/2 alcoholic cirrhosis. C/b variceal hemorrhage and possible HE. 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 Hepatology with reccs for medical stabilization prior to eval for possible transplant.     MELD-Na score: 22 at 9/7/2022  4:00 AM  MELD score: 21 at 9/7/2022  4:00 AM  Calculated from:  Serum Creatinine: 1.0 mg/dL at 9/7/2022  4:00 AM  Serum Sodium: 135 mmol/L at 9/7/2022  4:00 AM  Total Bilirubin: 6.3 mg/dL at 9/7/2022  4:00 AM  INR(ratio): 1.9 at 9/7/2022  4:00 AM  Age: 37 years     --OSH ammonia 87; continue lactulose - no BMs x 2 days but with diarrhea for past 2 days - holding lactulose  --f/u PETH  --holding home propranolol in setting of shock requiring vasopressors - can resume as clinically appropriate  -  adding lasix and spironolactone for continued chest tube drainage 2/2 hepatic hydrothorax  - giving albumin infusion for volume loss 2/2 hydrothorax (equivalent to large volume paracentesis)  --Hepatology consult, appreciate assistance - patient not a transplant candidate at this time due to continued alcohol use  - meld may preclude TIPS candidacy          HIGH RISK CONDITION(S):  Patient has a condition that poses threat to life and bodily function: decompensated liver disease        Tatiana Palmer MD

## 2022-09-11 NOTE — PLAN OF CARE
Problem: Adult Inpatient Plan of Care  Goal: Plan of Care Review  Outcome: Ongoing, Progressing  Goal: Patient-Specific Goal (Individualized)  Outcome: Ongoing, Progressing  Goal: Absence of Hospital-Acquired Illness or Injury  Outcome: Ongoing, Progressing     Problem: Infection  Goal: Absence of Infection Signs and Symptoms  Outcome: Ongoing, Progressing     Problem: Skin Injury Risk Increased  Goal: Skin Health and Integrity  Outcome: Ongoing, Progressing     Problem: Fall Injury Risk  Goal: Absence of Fall and Fall-Related Injury  Outcome: Ongoing, Progressing  Intervention: Identify and Manage Contributors  Flowsheets (Taken 9/11/2022 1830)  Self-Care Promotion:   independence encouraged   BADL personal objects within reach   BADL personal routines maintained  Intervention: Promote Injury-Free Environment  Flowsheets (Taken 9/11/2022 1830)  Safety Promotion/Fall Prevention:   assistive device/personal item within reach   nonskid shoes/socks when out of bed   side rails raised x 2   instructed to call staff for mobility     POC reviewed. AAOX4. B/P runs in 90's. Chest tube to water seal and monitor output. No complaint of discomfort, pain or SOB. Up to BR w/asst. Safety checks performed. Call light in reach. Bed in lowest position.

## 2022-09-12 PROBLEM — F10.20 ALCOHOL USE DISORDER, SEVERE, DEPENDENCE: Chronic | Status: ACTIVE | Noted: 2022-09-12

## 2022-09-12 LAB
ALBUMIN SERPL BCP-MCNC: 3.6 G/DL (ref 3.5–5.2)
ALP SERPL-CCNC: 88 U/L (ref 55–135)
ALT SERPL W/O P-5'-P-CCNC: 96 U/L (ref 10–44)
ANION GAP SERPL CALC-SCNC: 6 MMOL/L (ref 8–16)
ANISOCYTOSIS BLD QL SMEAR: SLIGHT
AST SERPL-CCNC: 117 U/L (ref 10–40)
BACTERIA SPEC ANAEROBE CULT: NORMAL
BASOPHILS # BLD AUTO: 0 K/UL (ref 0–0.2)
BASOPHILS NFR BLD: 0 % (ref 0–1.9)
BILIRUB SERPL-MCNC: 6 MG/DL (ref 0.1–1)
BUN SERPL-MCNC: 34 MG/DL (ref 6–20)
CALCIUM SERPL-MCNC: 8.4 MG/DL (ref 8.7–10.5)
CHLORIDE SERPL-SCNC: 99 MMOL/L (ref 95–110)
CO2 SERPL-SCNC: 24 MMOL/L (ref 23–29)
CREAT SERPL-MCNC: 0.9 MG/DL (ref 0.5–1.4)
DACRYOCYTES BLD QL SMEAR: ABNORMAL
DIFFERENTIAL METHOD: ABNORMAL
EOSINOPHIL # BLD AUTO: 0 K/UL (ref 0–0.5)
EOSINOPHIL NFR BLD: 0 % (ref 0–8)
ERYTHROCYTE [DISTWIDTH] IN BLOOD BY AUTOMATED COUNT: 17.1 % (ref 11.5–14.5)
EST. GFR  (NO RACE VARIABLE): >60 ML/MIN/1.73 M^2
GLUCOSE SERPL-MCNC: 129 MG/DL (ref 70–110)
HCT VFR BLD AUTO: 28.6 % (ref 40–54)
HGB BLD-MCNC: 9.3 G/DL (ref 14–18)
HYPOCHROMIA BLD QL SMEAR: ABNORMAL
IMM GRANULOCYTES # BLD AUTO: 0.03 K/UL (ref 0–0.04)
IMM GRANULOCYTES NFR BLD AUTO: 0.4 % (ref 0–0.5)
INR PPP: 2 (ref 0.8–1.2)
LYMPHOCYTES # BLD AUTO: 0.3 K/UL (ref 1–4.8)
LYMPHOCYTES NFR BLD: 4.5 % (ref 18–48)
MAGNESIUM SERPL-MCNC: 2.1 MG/DL (ref 1.6–2.6)
MCH RBC QN AUTO: 28.8 PG (ref 27–31)
MCHC RBC AUTO-ENTMCNC: 32.5 G/DL (ref 32–36)
MCV RBC AUTO: 89 FL (ref 82–98)
MONOCYTES # BLD AUTO: 0.3 K/UL (ref 0.3–1)
MONOCYTES NFR BLD: 3.9 % (ref 4–15)
NEUTROPHILS # BLD AUTO: 6.2 K/UL (ref 1.8–7.7)
NEUTROPHILS NFR BLD: 91.2 % (ref 38–73)
NRBC BLD-RTO: 0 /100 WBC
OVALOCYTES BLD QL SMEAR: ABNORMAL
PHOSPHATE SERPL-MCNC: 2.2 MG/DL (ref 2.7–4.5)
PLATELET # BLD AUTO: 35 K/UL (ref 150–450)
PMV BLD AUTO: ABNORMAL FL (ref 9.2–12.9)
POIKILOCYTOSIS BLD QL SMEAR: SLIGHT
POLYCHROMASIA BLD QL SMEAR: ABNORMAL
POTASSIUM SERPL-SCNC: 3.8 MMOL/L (ref 3.5–5.1)
PROT SERPL-MCNC: 6.4 G/DL (ref 6–8.4)
PROTHROMBIN TIME: 20.3 SEC (ref 9–12.5)
RBC # BLD AUTO: 3.23 M/UL (ref 4.6–6.2)
SODIUM SERPL-SCNC: 129 MMOL/L (ref 136–145)
SPHEROCYTES BLD QL SMEAR: ABNORMAL
WBC # BLD AUTO: 6.85 K/UL (ref 3.9–12.7)

## 2022-09-12 PROCEDURE — 99223 1ST HOSP IP/OBS HIGH 75: CPT | Mod: ,,, | Performed by: PSYCHIATRY & NEUROLOGY

## 2022-09-12 PROCEDURE — 25000003 PHARM REV CODE 250: Performed by: HOSPITALIST

## 2022-09-12 PROCEDURE — 85025 COMPLETE CBC W/AUTO DIFF WBC: CPT | Performed by: NURSE PRACTITIONER

## 2022-09-12 PROCEDURE — 25000003 PHARM REV CODE 250

## 2022-09-12 PROCEDURE — 63600175 PHARM REV CODE 636 W HCPCS: Mod: JG | Performed by: HOSPITALIST

## 2022-09-12 PROCEDURE — 27000207 HC ISOLATION

## 2022-09-12 PROCEDURE — 80053 COMPREHEN METABOLIC PANEL: CPT | Performed by: NURSE PRACTITIONER

## 2022-09-12 PROCEDURE — 36415 COLL VENOUS BLD VENIPUNCTURE: CPT | Performed by: NURSE PRACTITIONER

## 2022-09-12 PROCEDURE — P9047 ALBUMIN (HUMAN), 25%, 50ML: HCPCS | Mod: JG | Performed by: HOSPITALIST

## 2022-09-12 PROCEDURE — 84100 ASSAY OF PHOSPHORUS: CPT | Performed by: NURSE PRACTITIONER

## 2022-09-12 PROCEDURE — 25000003 PHARM REV CODE 250: Performed by: STUDENT IN AN ORGANIZED HEALTH CARE EDUCATION/TRAINING PROGRAM

## 2022-09-12 PROCEDURE — 85610 PROTHROMBIN TIME: CPT | Performed by: NURSE PRACTITIONER

## 2022-09-12 PROCEDURE — 83735 ASSAY OF MAGNESIUM: CPT | Performed by: NURSE PRACTITIONER

## 2022-09-12 PROCEDURE — 20600001 HC STEP DOWN PRIVATE ROOM

## 2022-09-12 PROCEDURE — 99223 PR INITIAL HOSPITAL CARE,LEVL III: ICD-10-PCS | Mod: ,,, | Performed by: PSYCHIATRY & NEUROLOGY

## 2022-09-12 RX ORDER — PANTOPRAZOLE SODIUM 40 MG/1
40 TABLET, DELAYED RELEASE ORAL DAILY
Status: DISCONTINUED | OUTPATIENT
Start: 2022-09-12 | End: 2022-09-18 | Stop reason: HOSPADM

## 2022-09-12 RX ADMIN — RIFAXIMIN 550 MG: 550 TABLET ORAL at 08:09

## 2022-09-12 RX ADMIN — ALUMINUM HYDROXIDE, MAGNESIUM HYDROXIDE, AND DIMETHICONE 30 ML: 400; 400; 40 SUSPENSION ORAL at 06:09

## 2022-09-12 RX ADMIN — LACTULOSE 20 G: 20 SOLUTION ORAL at 08:09

## 2022-09-12 RX ADMIN — FUROSEMIDE 20 MG: 20 TABLET ORAL at 09:09

## 2022-09-12 RX ADMIN — Medication 100 MG: at 08:09

## 2022-09-12 RX ADMIN — PIPERACILLIN SODIUM AND TAZOBACTAM SODIUM 4.5 G: 4; .5 INJECTION, POWDER, LYOPHILIZED, FOR SOLUTION INTRAVENOUS at 11:09

## 2022-09-12 RX ADMIN — ALBUMIN (HUMAN) 25 G: 0.25 INJECTION, SOLUTION INTRAVENOUS at 08:09

## 2022-09-12 RX ADMIN — PIPERACILLIN SODIUM AND TAZOBACTAM SODIUM 4.5 G: 4; .5 INJECTION, POWDER, LYOPHILIZED, FOR SOLUTION INTRAVENOUS at 08:09

## 2022-09-12 RX ADMIN — SPIRONOLACTONE 100 MG: 100 TABLET ORAL at 08:09

## 2022-09-12 RX ADMIN — PANTOPRAZOLE SODIUM 40 MG: 40 TABLET, DELAYED RELEASE ORAL at 09:09

## 2022-09-12 RX ADMIN — FOLIC ACID 1 MG: 1 TABLET ORAL at 08:09

## 2022-09-12 NOTE — NURSING
Morning bedside handoff report complete.  He is resting quiet.  Chest tube continues to water seal container with thin light yellow output.  No sign of distress noted at this time.

## 2022-09-12 NOTE — PLAN OF CARE
Problem: Adult Inpatient Plan of Care  Goal: Plan of Care Review  Outcome: Ongoing, Progressing  Goal: Patient-Specific Goal (Individualized)  Outcome: Ongoing, Progressing  Goal: Absence of Hospital-Acquired Illness or Injury  Outcome: Ongoing, Progressing  Goal: Optimal Comfort and Wellbeing  Outcome: Ongoing, Progressing     Problem: Fall Injury Risk  Goal: Absence of Fall and Fall-Related Injury  Outcome: Ongoing, Progressing     Problem: Infection  Goal: Absence of Infection Signs and Symptoms  Outcome: Ongoing, Progressing     Problem: Skin Injury Risk Increased  Goal: Skin Health and Integrity  Outcome: Ongoing, Progressing   He has had a remarkably good day.  He has tolerated his food without problem.  His sterile dressing to the chest tube is changed after heavy thin yellow drainage soiled the dressing.  He reports feeling very encouraged by meeting with addiction doctor and psychiatry.  He reports not feeling this good in a long time and is happy with his sobriety.  No sign of distress noted at this time.

## 2022-09-12 NOTE — PROGRESS NOTES
Progress Note  Hospital Medicine      Patient Name: Porfirio Wilson  MRN: [id number]  Admission Date: 9/3/2022    SUBJECTIVE:     Follow-up For:  Acute hypoxemic respiratory failure     Interval history/ROS:   9/12: decreased chest tube output    9/11l: No acute issues overnight    9/10: patient on room air during rounding.        9/9: discussed with CTS - will need to leave chest tube in for now unclamped. 5 L output documented over 24 hours.     HPI:  Patient is a 36 y.o. male with significant past medical history of ETOH cirrhosis (complicated by presence of esophageal varices s/p band ligation), bipolar d/o and pancreatitis presented to [facility] on 9/3 complaining of shortness of breath x2 hours with associated fever/chills, cough and diaphoresis. Patient was intubated in the ED due to degree of respiratory distress. Cxry showed right pleural effusion and pneumothorax and focal consolidation on the left. He was found to be Covid positive. Patient had a right side chest tube placed. Labs at OSH significant for leukocytosis of 16, procal 0.46, lactic 5, , INR 2.1, negative troponin, ETOH negative and ammonia 86.  Pt was tx w/ levaquin & flagyl, as well as given IVF & vit K for coagulopathy associated w/ liver dz. Pt is currently requiring levo for map > 65. Last reported ETOH drink ~ 6 weeks ago.      Patient has been transferred to [facility] for higher level of care and Hepatology services.     OBJECTIVE:     Body mass index is 21.89 kg/m².    Vital Signs Range (Last 24H):  Temp:  [97.5 °F (36.4 °C)-98.7 °F (37.1 °C)]   Pulse:  [62-84]   Resp:  [16-20]   BP: ()/(54-72)   SpO2:  [96 %-100 %]     I & O (Last 24H):    Intake/Output Summary (Last 24 hours) at 9/12/2022 1407  Last data filed at 9/12/2022 0858  Gross per 24 hour   Intake 800 ml   Output 90 ml   Net 710 ml          Physical Exam:  Vitals and nursing note reviewed.   Constitutional:       Appearance: Normal appearance.  He is not ill-appearing.   HENT:      Head: Normocephalic and atraumatic.      Right Ear: External ear normal.      Left Ear: External ear normal.      Nose: Nose normal.      Mouth/Throat:      Mouth: Mucous membranes are moist.      Pharynx: Oropharynx is clear.   Eyes:      General: Scleral icterus present.      Pupils: Pupils are equal, round, and reactive to light.   Cardiovascular:      Rate and Rhythm: Regular rhythm. Bradycardia present.      Pulses: Normal pulses.      Heart sounds: Normal heart sounds. No murmur heard.  Pulmonary:      Effort: Pulmonary effort is normal.      Breath sounds: Normal breath sounds.      Comments: R surgical chest tube, dressing c/d/i  Abdominal:      General: Bowel sounds are normal. There is no distension.      Palpations: Abdomen is soft.      Tenderness: There is no abdominal tenderness.   Musculoskeletal:         General: No swelling or deformity.      Cervical back: Normal range of motion and neck supple.      Right lower leg: No edema.      Left lower leg: No edema.   Skin:     General: Skin is dry.      Capillary Refill: Capillary refill takes less than 2 seconds.      Coloration: Skin is jaundiced.   Neurological:      General: No focal deficit present.      Mental Status: He is alert and oriented to person, place, and time.        Recent Labs   Lab 09/10/22  0714 09/11/22  0541 09/12/22  0331   * 128* 129*   K 4.2 4.2 3.8    99 99   CO2 21* 22* 24   BUN 40* 39* 34*   CREATININE 1.2 1.2 0.9   * 111* 129*   CALCIUM 8.0* 8.7 8.4*   MG 1.8 2.0 2.1   PHOS 3.5 2.8 2.2*       Recent Labs   Lab 09/10/22  0714 09/11/22  0541 09/12/22  0331   ALKPHOS 91 84 88   ALT 59* 69* 96*   AST 79* 79* 117*   ALBUMIN 2.7* 3.5 3.6   PROT 6.2 6.9 6.4   BILITOT 6.6* 6.9* 6.0*   INR 1.8* 1.9* 2.0*         Recent Labs   Lab 09/10/22  0714 09/11/22  0541 09/12/22  0332   WBC 6.94 5.61 6.85   HGB 9.9* 10.3* 9.3*   HCT 30.3* 31.8* 28.6*   PLT 44* 40* 35*   GRAN 86.5*  6.0  87.9*  4.9 91.2*  6.2   LYMPH 7.5*  0.5* 7.0*  0.4* 4.5*  0.3*   MONO 5.6  0.4 4.6  0.3 3.9*  0.3         No results for input(s): POCTGLUCOSE in the last 168 hours.    No results for input(s): TROPONINI in the last 168 hours.      Diagnostic Results:  Labs: Reviewed    albumin human 25%, 25 g, Intravenous, Q12H  folic acid, 1 mg, Oral, Daily  furosemide, 20 mg, Oral, Daily  lactulose, 20 g, Oral, TID  pantoprazole, 40 mg, Oral, Daily  piperacillin-tazobactam (ZOSYN) IVPB, 4.5 g, Intravenous, Q8H  rifAXImin, 550 mg, Oral, BID  spironolactone, 100 mg, Oral, Daily  thiamine, 100 mg, Oral, Daily      As Needed acetaminophen, aluminum & magnesium hydroxide-simethicone, methocarbamoL, sodium chloride 0.9%, traMADoL    ASSESSMENT/PLAN:     Assessment: Porfirio Wilson is a 37 y.o. male here with:     Active Hospital Problems    Diagnosis  POA    *Acute hypoxemic respiratory failure [J96.01]  Yes    Alcohol use disorder, severe, dependence [F10.20]  Yes     Chronic    Urinary retention [R33.9]  Yes    Oropharyngeal bleeding [J39.2]  No    Seizure [R56.9]  Yes    Decompensated hepatic cirrhosis [K72.90, K74.60]  Yes      Resolved Hospital Problems   No resolved problems to display.        Plan:     Seizure  --reported to have full tonic-clonic seizure like activity lasting ~ 1 min and resolved spontaneously at onset of transfer from OSH per EMS  --head CT negative for acute intracranial abnormality   --if occurs again, will obtain EEG    Oropharyngeal bleeding  --~ 100cc blood from oropharynx night of transfer  --no blood from ETT and no blood from OGT  --? If pt bit tongue during above seizure like activity  --continue daily ppi; if concern develops for GIB will change to IV protonix bid & consult GI      Acute hypoxemic respiratory failure  Patient with Hypoxic Respiratory failure which is Acute.  he is not on home oxygen. Supplemental oxygen was provided and noted-  .   Signs/symptoms of respiratory failure include-  tachypnea, increased work of breathing and respiratory distress. Contributing diagnoses includes - Pleural effusion, Pneumonia and pneumothorax Labs and images were reviewed. Patient Has not had a recent ABG. 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 CT imaging concerning for chest tube in lung parenchyma vs lung re-expansion around tube. CTS evaluation with recs for stabilization prior to any intervention    --continue dex, remdesivir completed  --minimal residual ptx on chest CT.   --f/u blood and sputum cx; f/u results & tailor abx as appropriate - vancomycin discontinued 9/7, pip-tazo discontinued 9/8  --f/u pleural fluid studies - will add on LDH, glucose, protein - c/w transudative process likely hepatohydrothorax  - given diarrhea, checking legionella urine antigen - pending  - 9/9: CT reviewed, discussed with CTS - tube to remain in place unclamped    Decompensated hepatic cirrhosis  2/2 alcoholic cirrhosis. C/b variceal hemorrhage and possible HE. 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 Hepatology with reccs for medical stabilization prior to eval for possible transplant.     MELD-Na score: 22 at 9/7/2022  4:00 AM  MELD score: 21 at 9/7/2022  4:00 AM  Calculated from:  Serum Creatinine: 1.0 mg/dL at 9/7/2022  4:00 AM  Serum Sodium: 135 mmol/L at 9/7/2022  4:00 AM  Total Bilirubin: 6.3 mg/dL at 9/7/2022  4:00 AM  INR(ratio): 1.9 at 9/7/2022  4:00 AM  Age: 37 years     --OSH ammonia 87; continue lactulose - no BMs x 2 days but with diarrhea for past 2 days - holding lactulose  --f/u  PETH  --holding home propranolol in setting of shock requiring vasopressors - can resume as clinically appropriate  - adding lasix and spironolactone for continued chest tube drainage 2/2 hepatic hydrothorax  - giving albumin infusion for volume loss 2/2 hydrothorax (equivalent to large volume paracentesis)  --Hepatology consult, appreciate assistance - patient not a transplant candidate at this time due to continued alcohol use  - meld may preclude TIPS candidacy          HIGH RISK CONDITION(S):  Patient has a condition that poses threat to life and bodily function: decompensated liver disease        Tatiana Palmer MD

## 2022-09-12 NOTE — DISCHARGE INSTRUCTIONS
REFERRAL RECOMMENDATIONS FOR SUBSTANCE ABUSE & MENTAL HEALTH      IN CASE OF SUICIDAL THINKING, call the National Suicide Hotline Number: 988    988 Suicide & Crisis Lifeline: 986 , 4-954-222-CYAC (4377)  https://988SnoopWall.Invarium       SUBSTANCE ABUSE:     [facility] (formerly known as the ABU)  [x] [phone], Option 2  [x] [address] [facility] 4[facility], [place] [address]  [x] [email or web address]  [x] The [facility] delivers comprehensive and collaborative treatment for alcohol and substance use disorders.  Excellent program for working professionals or anyone else seeking recovery.  [x] Requires insurance approval prior to starting program, call number above for more information.  [x] Intensive Outpatient Rehabilitation Program - M-F 9am-3pm - daily groups with psychologists and social workers, sessions with MDs 3x per week   [x] Ambulatory detox and dual diagnosis available      SUBOXONE:     NOTE: some Suboxone clinics require their clients to participate in a structured program (such as an IOP) in order to be prescribed Suboxone.  Some clinics have a long waiting list.  Most of these clinics do not accept walk-in clients, so call first to to learn what must be done to get started on Suboxone.    [facility] - [phone] (can do Sublocade)  [address] [facility], [place] [address]    [address]  [phone]    [facility] - [phone] (can do Sublocade)  [address] [place] [address]    [facility]  [address] [place]  [phone]     [facility] (very short waiting list, may accept some walk-in's but call first if possible)  [address], [place] [address]  [phone]; [phone]    [facility]   [address] [place]    [phone]    [facility] (can usually be seen within a week but is cash only  for appointment)  [address] [facility]  [address] [place], [place]  [phone]    [facility] ([facility])  [address] [facility] [address]  [phone]    [place], [place]:    [facility] - [address] [place] [address] - Tel: [phone]    Laz Bre - [address] [place] [address] - Tel: [phone]    Elpidio Paredes - [address] [place] [address] - Tel: [phone]    Nato Patel - [address] [place] [address] - Tel: [phone]    Tito Horvath - [address] [place] [address] - Tel: [phone]    [place], [place]:     Dr. Maday Forte and Dr. All Gorman - [address], [place] - Tel: [phone]    Dr. Fabi Welch - [address] [facility] [place], [place] - Tel: [phone]    Dr. Arden Monteiro - Tel: [phone]    Dr. Ludin Haji - [facility] - [phone]      METHADONE:     [facility] (the only methadone clinic in the [facility], has two locations)  [x] [place] - [address] [place] [address], [phone]  [x] [facility] [place] [address], [phone]      12 STEP PROGRAMS (and similar):     Alcoholics Anonymous (local)  [x] [phone]  [x] www.[place].org for schedules for in-person and online meetings  [x] There are AA meetings throughout the day all over [facility]  [x] AA costs nothing to attend; they pass a basket for donations but this is not required    Narcotics Anonymous  [x] [phone]  [x] www.noana.org  [x] There are NA meetings throughout the day all over town  [x] NA costs nothing to attend; they pass a basket for donations but this is not required    Alcoholics Anonymous Online Intergroup (national)  [x] www.aa-intergroup.org  [x] Good resource for large, nation-wide meetings  [x] Can also attend smaller, local meetings in other cities  [x] Countless meetings all day and all night  [x] AA costs nothing to attend; they pass a basket for donations  but this is not required    LOOKING FOR AN ALTERNATIVE TO 12 STEP PROGRAMS - check out:  SMART Recovery: https://www.smartrecovery.org/about-us  [facility] Recovery: https://recoverydharma.org      DETOX UNITS (USUALLY 5-7 DAYS):     [facility]: [address], [facility]  [phone], call first to ensure bed availability    [facility]: [address] [facility]  [phone], Option 1, call first to ensure bed availability    [facility]: [address] , [facility]  [phone] (intake by appointment only)    [facility]: [address], [facility]  [phone]      INTENSIVE OUTPATIENT PROGRAMS:     [facility] (formerly known as the ABU)  [x] [phone], Option 2  [x] [address] [facility] 4th Floor, [facility] [address]  [x] [email or web address]/[email or web address]covery-[facility]  [x] The [facility] delivers comprehensive and collaborative treatment for alcohol and substance use disorders.  Excellent program for working professionals or anyone else seeking recovery.  [x] Requires insurance approval prior to starting program, call number above for more information.  [x] Intensive Outpatient Rehabilitation Program - M-F 9am-3pm - daily groups with psychologists and social workers, sessions with MDs 3x per week   [x] Ambulatory detox and dual diagnosis available    [facility] Intensive Outpatient Program  [x] [phone]  [x] [address] [facility] (the clinic not [facility])  [x] Call number above for more info and to check insurance requirements    [facility]  [address] [place], [place] [address] [phone]    [facility]:  [address] [facility], [address] [place], [place] [address]?, [phone]  [address] [facility]?, [place], [place] [address]?, [phone]    RESIDENTIAL REHABS (USUALLY 28 DAYS):     [facility]: [address] [facility] [facility] [phone]    [facility]:  [address] [place] [address]  [phone] (intake by appointment only)    [facility] (men only) [address] [place] [address], [phone]    [facility] (Female only) [address] [place] [address], [phone]    [facility]: [address], [place], men's program [phone], women's program [phone]    [facility]: [address] [place], [phone]    [facility]: [address] [place], [place], [phone]    [facility]: Men only, [phone], [address] [place]    [facility]: [address], [place], [place], [phone]    Critical access hospital Recovery Center: [address] [place] [address],  [phone]  New Location: [address] [place] [address] [address], [place] [address], [phone]    [facility]:   ?[address], [place] [address]?[phone]    [place]: [address] [place] [address], [phone]    [place]: [place], [place], [phone]     [facility]: [place], [place], [phone]    [facility]: [place], [place], [phone]    [facility]: [place], [place], [phone]    [place]: [place], [place], [phone]    [facility]: [address] [facility], [place] [address], [phone]    COMMUNITY ADDICTION CLINICS:     [facility]: [address] [facility], [address] [place], [phone] -or- [address] [place] [address]    [facility] [place]: [address], [place]  [address]     [facility]: Clinics [phone]; Crisis [phone]    [facility]: [address] [place] [address]    [facility]: [address] [place], [place] [address]    [facility]: [address] [place] [address],    [facility] Behavior[facility] Center: 2nd Floor [address] [place] [address]    [facility]: [address], [place] [address]    [place]  [facility], [address], [place] [address]    [facility]: [address] [facility], [place] [phone]  (serves youth 16-23 years old)    [facility]: [facility]/[place]. [place] [phone]    [facility]: [address] [facility], [place] [phone]    [facility]: [address], [place] [phone]    [facility]: [address] [facility], [address], [phone]     [facility]: [address] [facility] [place], [phone], [phone]    RESOURCES IN OTHER [facility]:     [facility]: [address] [place], [phone], [phone]    [place] [facility]: [address] [facility], [address], [phone]    [facility], [address] [place] [address], [phone]    [facility] Behavioral Health: [address] [facility] [place], [phone]    [facility], [address] [facility], [phone] ([facility])    [facility], [address] [facility], [phone] ([facility])    [facility], [address] [facility], [address], [place], [phone] ([place], [place], and [facility])    [place], [address] , [place], [phone] ([facility])    [facility] Hotline [phone], [phone]    [facility], [address] [facility], [facility], [address], [facility], [address] [facility], [facility], [address] [facility]., [facility] Behavior[facility] Health Center, [address], [place]    [facility], [address], [place] [phone]    [facility]  "[facility], [address] [facility] [phone]    [facility] Addiction Recovery, [address] [facility], [phone]    [facility], [address] [facility], [phone]      Tajik SPEAKING (en español):     Información de la reunión de Alcohólicos Anónimos  [facility]to [facility]iatric, 10:00 am  Habla español  Esta reunión está abierta y cualquiera puede asistir.    Senegalese speaking Alcoholics anonymous meetings:  El "Rigoberto Porter AA Skype" es un rigoberto on line de Alcohólicos Anónimos en [facility]. El rigoberto es mike, gratuito y virtual a través de Skype Audio. El rigoberto funciona mediante agustín llamada grupal de voz, por lo que no se utiliza la videollamada, ni se pueden nohemy las imágenes o rostros de los participantes. Hace silva años y medio abrimos el primer Rigoberto de AA por Skype en [place], zofia actualmente asisten personas desde [place], [place], [place], [place], [place], [place], [place], [place], [place], [place], [place] y [place], entre otros.    El rigoberto es muy útil para los alcohólicos que residen en lugares donde no se celebran reuniones de AA, o residen en lugares donde las reuniones de AA son un número limitado de días a la semana, o para aquellos compañeros que se hayan de viaje o que, por cualquier motivo, se hayan convalecientes y no pueden desplazarse. Todos los días nos reuniones a las 21:00 (hora española)    Podéis obtener más información sobre el rigoberto y kirstie sesiones en la página web [email or web address]      MENTAL HEALTH:     [facility] Outpatient [facility]  [phone]    [facility] - General Psychiatry Intensive Outpatient Program  [facility] (formerly known as the [facility])  [phone], option 3    [facility] - Dual Diagnosis Intensive Outpatient Program  [facility] (formerly " known as the [facility])  [phone], option 2      COMMUNITY MENTAL HEALTH CENTERS:     [facility]  ([facility])  Serves [facility] residents.  Serves uninsured patients & those with Medicaid.  Main location: [address] [place], [place] [address] [phone]  Walk-in's available during regular business hours.  24/7 Crisis Line: [phone]    [facility]  (aka [facility], [facility])  Serves [facility].  Serves uninsured patients, those with Medicaid and some private plans.  Walk-in's available during regular business hours.  Primary care services available as well.  [facility]: [address] [facility] [place] [address];  [phone]  [place]: [address] [place] [address];  [phone]  24/7 Crisis Line: [phone]    [facility]  Serves uninsured patients & those with Medicaid, call for more info.  Primary care, pediatrics, HIV treatment, and dentistry services available as well.  Three locations.  [phone]    [facility]?Corporate Office  Serves patients with Medicaid, Medicare, and private insurance  [address]  [place],?[place] [address] [phone]    [facility]  Serves uninsured on a sliding scale, as well as Medicaid, Medicare, and private plans.  Eight locations around the [facility] area.  [phone]    [facility]  Serves uninsured patients & those with Medicaid, private insurances.  Primary care available as well.  [phone]  [facility] [place] [address]    [facility]  Serves veterans who were honorably discharged.  [address] [place] [address]  [phone]  24/7 Veterans Crisis Line: [phone] (Press 1)    If you have private insurance and need to find a  specialist, please contact your insurance network to request a list of providers covered by your benefits.      MENTAL HEALTH/ADDICTIVE DISORDERS:     AA ([phone]), NA ([phone])   National Suicide Prevention Lifeline- Call 1-907.513.6434 Available 24 hours everyday  [facility] 702-2074; Crisis Line [phone] - Call for options A-F:  Intensive Outpatient Treatment/ Day programs   [facility], please contact   [facility], please contact [phone] or [phone] to speak with an admissions counselor.  [facility] (Methadone Maintenance)   [address] [place] [address], [phone]  [address], [place] [address] [phone]  [facility], [address] [address], [phone]  [facility] Addiction [facility] [phone]  [facility] please contact [phone]  [facility], [address] [facility] [place], 4th [place] [address] Phone: [phone]   [facility]  [place] Office: [address] [address], [phone]  [place] Office: [address] [place] [address] B, [place] [address], [phone]  [place] Office: [address] [facility] [address] [phone]    Outpatient Substance Abuse Treatment   [facility] (Methadone Maintenance)   [address] [place] [address], [phone]  [address], [place] [address] [phone]  [facility], [address] [address], [phone]  [facility]  [place] Office: [address] [address], [phone]  [place] Office: [facility], [address], [place] [address], [phone]  [place] Office: [address] [place] [address] [phone]  [place] [facility], [address] [place] [address] [phone]   [facility] Recovery, [address] [facility], [address], [phone]  [place]  [facility] for [facility] Recover, [address] [place], [place] [phone]    Residential Substance Abuse Treatment   [facility] [address] [facility], [phone] or [phone]  [facility], [address] [facility], [phone]  [facility] (men only) [address] [place] [address], [phone]  Women at the [facility] (women only) [address] [place] [address] [phone]  [facility], [address] [place] [address] [phone]  [facility] (women only), intakes at 4[address] [facility], [phone]  [facility]ibility [place] (7-day program, $100, [address], [phone], [phone], [phone])  [facility] (Men only, [phone]), [address], [place] (Vets*/Non-Vets)  [facility] (Men only, $400/month program fee) [address] [facility], [phone]  [facility] (Women over age 39 only), [address] [facility], [phone]    Out of Area:    [place], [address] [place] [address] [place], [place] [phone])  [facility] (men only), [address] [facility] [place] [address], [phone]  [facility], [address] [place], [place] ([phone])  [place], [facility] [place], [place] ([phone])  [facility], [address] [facility], [phone]  [facility] (Women only, has to have mental illness, can be homeless or substance abuser), [phone]        DOMESTIC VIOLENCE RESOURCES:     Advocacy  [facility] ([facility])  [address] [place] [address]    NOFJC ? [phone]  Services provided: emergency shelter, individual advocacy, information and referrals, group support, children's program, medical advocacy, forensic medical exams, primary care, legal assistance, counseling, safety planning, and caregiver support    [facility] HEALING AND EMPOWERMENT [place]  Confidential location  [facility] ? [phone]  Services provided: short term emergency shelter, all services provided are free of charge    [facility] FOR COMMUNITY ADVOCACY  Multiple locations in [facility], [place], [facility], [place], and [facility] ([place], [place], and [place])    [place] ? [phone]  Services provided: emergency shelter, individual advocacy, information and referrals, group support, children's program, medical advocacy, legal assistance in obtaining restraining orders, counseling, safety planning, and caregiver support    [facility]   Emergency Shelter   [phone]  Emergency Services ,Legal and Financial Assistance Services ,Housing Services ,Support Services     [facility]   [phone]  Emergency Services ,Counseling Services , Housing Services ,Support Services ,Children's Services     WOMEN WITH A VISION  [address] [place] [address]  WWAV ? [phone]  Services provided: advocacy, health education and supportive services, specializing in free healing services for marginalized groups, including LGBTQ individuals and sex workers    SEXUAL TRAUMA AWARENESS AND RESPONSE (STAR)  [address] [place] [address]    STAR ? (356) 425-STAR  Services provided: individual advocacy, information and referrals, group support, medical advocacy, legal assistance, counseling, and safety planning for survivors of sexual assault    [facility]  [address] [place] [address]  [facility] Forensic Program ? [phone]  Services provided: free forensic medical exams for sexual assault and domestic violence, which can be performed up to 5 days after an incident. It is not necessary to make a police report to receive a forensic medical exam    Legal  PRO[facility]  [address] [address]  Project SAVE ? [phone]  Services provided: free emergency legal representation for survivors of doemstic  violence residing in [facility]. Legal services may include temporary restraining orders, temporary child support, custody, and use of property    [facility] LEGAL SERVICES (SLLS)  [address], [place] [address]  SLLS ? [phone]  Services provided: free legal representation for survivors of domestic violence residing in [facility]. Legal services may include temporary child support, custody, and divorce      HOTLINES:     [facility] DOMESTIC VIOLENCE HOTLINE  [phone]    Services provided: free and confidential hotline for victims and survivors of domestic violence. All calls will be routed to a domestic violence service provided in the victim or survivor's area    NATIONAL HUMAN TRAFFICKING HOTLINE  (421) 493-7201    Services provided: national anti-trafficking hotline serving victims and survivors of human trafficking. Provides information about local resources, and access to safe space to report tips, seek services, and ask for help    VIA LINK  211 or [phone]    Service provided: counselors can provide crisis counseling. Counselors can also provide information and referrals to programs which can help with needs such as food, shelter, medical care, financial assistance, mental health services, substance abuse treatment, senior services, childcare, and more      HOMELESS SHELTERS:      Homeless shelters  [facility]  Emergency shelter for individuals and families  [address]  [phone]  [facility]  Emergency shelter for men only  Meals daily 6am, 2pm, & 6pm  Clothing, case management M-F by appointment (ID/job/housing/legal assistance), mail  [address] [facility]  [phone]  [facility]  Emergency shelter for men  [address] [facility]  [phone]  Emergency shelter for women  [address] [facility]  [phone]  Breakfast & lunch daily, dinner M-F  Case management, job counseling services   [facility]  Emergency shelter for  teens and young adults up to 20yo  [address]  [phone]  [place] [facility]  Emergency shelter for women over 17yo and their kids  [address] [place] [address]  [phone]  [facility]  Day program, meals M-F 1PM (arrive early)  Showers, laundry, hygiene kits, showers, phones, , notary services, case management, ID assistance  [address] [place] [facility]   [phone] M-F 8am-2:30pm  [facility]  Day program  MTWF 7:30am-3:30pm,  8:30am-3:30pm  Crisis intervention, employment assistance, food/clothing, hygiene kits, bus tokens, mail  [address] [facility] [address]  [phone]  [facility]  Mobile outreach for homeless persons in [facility]  [phone]  [facility]  Primary healthcare, case management, dental services, TB placement  Call ahead  [address] [facility] [address]nd [address]  [phone]  [facility]  Connects homeless people with their loved ones in other cities by providing transportation costs   [phone]      [place] RESOURCES:     [place] [facility] Mental Health Crisis Response Team:    Region 12 ([place], [place], [place], and [facility]) ([facility])  [phone]      Outpatient Mental Health & Addiction Clinic Resources for both [facility] and [facility]:    formerly [facility] Mental Healthcare Resources  Website: [email or web address]  Main Number: [phone]    [facility] ([facility])  [address] ([address] [facility]) [place] [address]  [phone]    [facility] ([facility])  [address] ([address]) [place] [address] [phone]    [facility]  [address] ([address]) [place] [address] [phone]    [facility]  [address] ([address] [facility] [place] [address] [phone]      Addiction Treatment Resources for both [facility] and  [facility]:    [facility] (Detox, Residential, PHP, IOP, and Aftercare Programs)  [address] [place] [address]  [phone]    [facility] (Residential, IOP, Transitional Living, and Aftercare Programs)  [address] [place] [address]  [phone]    [facility] (Inpatient, Residential, Detox, IOP, Outpatient, and Aftercare Programs)  [address] [place] [address]  [phone] or toll free at [phone]      Outpatient Mental Health Psychotherapy Resources for both [facility] and [facility]:    Dianne Child, [facility]  [address]  [place] [address]  [phone]  Specialties: Depression, Anxiety, and Life Transitions    Solange Tate, PhD  [address] [address]  [address]  [place] [address]  [phone]  Specialties: Testing and Evaluation, Education and Learning Disabilities, and ADHD    Kandice Malhotra, [facility] [address] [place] [address]  [phone]  Specialties: Obsessive-Compulsive (OCD), Depression, and Relationship Issues    Jenn Cheney [facility] [address] [place] [address]  [phone]  Specialties: Trauma & PTSD, Mood Disorders, and Anxiety    Jenn Garcia, PhD, [facility] [address] [address]  [phone]  Specialties: Family Conflict, Child, and Relationship Issues    Carmen Leggett [facility] [place] [address] [phone]  Specialties: Anxiety, Depression, and Anger Management        IN CASE OF SUICIDAL THINKING, call the National Suicide Hotline Number: 988    988 Suicide & Crisis Lifeline: 985 , 2-408-683-TALK (8907)  Provides 24/7, free and confidential support for people in distress, prevention and crisis resources for you or your loved ones, and best practices for professionals.    Call,  text or chat.  https://[employer].org

## 2022-09-12 NOTE — PROGRESS NOTES
COLONOSCOPY: SUTAB     Re: Johnson Jones   [address] [place] [address]    Provider: Holden Kim MD    Your colon must be cleaned of stool to have a good view. You should follow these directions to have a successful colonoscopy.     Your exam is scheduled as an outpatient procedure on:     Day: Friday    Date: OCTOBER 28 2022    Arrival Time: 10:15 AM(You will receive a confirmation message 3 days before your appointment, if you do not receive a message or have questions, contact [phone] or visit the patient portal for details.). Be aware your procedure time is subject to change.)     You will be receiving sedation for your procedure and MUST have an adult over 18 to drive you home and be with you after procedure.     Location: [facility], [address] [place] [address] - Directions: Come all the way into the main lobby of the building and take the interior elevator down to the lower level. Turn left off the elevator and walk straight ahead to the Endoscopy reception area.     You will need the following in order to complete your prep:                  1. SUTAB Bowel Prep QTY 24 TABLETS       2. Two Simethicone tablets (OVER THE COUNTER)       3. Two Dulcolax (Bisacodyl) tablets (OVER THE COUNTER)    Your prep kit has been sent to   [id number] - [place], [place] - [address].  [address].  [facility] [address]  Phone: [phone] Fax: [phone]  . Please  the kit today. If not picked up within 7 days contact your pharmacy directly as prescription would been placed back and re-stock.    7 days before colonoscopy: Review your colonoscopy instructions. (Instructions can also be found on [facility] under the letters tab under communication)  • Stop eating popcorn, nuts, flax/sesame seeds, or any food that contains seeds    3 BUSINESS DAYS BEFORE your procedure:  • Stop taking all anti-inflammatory medicines. These include  Pharmacokinetic Assessment Follow Up: IV Vancomycin    Vancomycin Regimen Assessment & Plan  - Slight bump in SCr today 0.8 --> 1.3 over ~23h. Changing from scheduled vancomycin regimen to pulse dosing by levels.  - Vancomycin level drawn ~10h after 1 g IV dose resulted as 20.9 mcg/mL.  - Administer vancomycin 750 mg IV x1 dose today, then draw level with morning labs tomorrow. Pharmacy to continue pulse dosing vs schedule new regimen depending on level and renal function.    Drug levels (last 3 results):  Recent Labs   Lab Result Units 09/05/22  1219   Vancomycin, Random ug/mL 20.9     Pharmacy will continue to follow and monitor vancomycin.    Please contact pharmacy at extension [phone] for questions regarding this assessment.    Thank you for the consult,   Humphrey Luna     Patient brief summary:  Porfirio Wilson is a 37 y.o. male initiated on antimicrobial therapy with IV Vancomycin for treatment of bacteremia    Drug Allergies:   Review of patient's allergies indicates:   Allergen Reactions    Ampicillin     Ceclor [cefaclor]     Penicillins      Actual Body Weight:   72 kg    Renal Function:   Estimated Creatinine Clearance: 80 mL/min (based on SCr of 1.3 mg/dL).,     Dialysis Method (if applicable):  N/A     Advil, Aleve, Naprosyn, (Tylenol is okay to take)    1 DAY BEFORE the procedure:     •Start a strict, clear liquid diet from the moment you wake. A clear liquid diet can include: Apple juice, white grape juice, and white cranberry juice; Beef or chicken broths that are clear - no noodles, vegetables, rice, etc.Tea and coffee without milk; -Soda pop, Gatorade, Tremaine-Aid and various -Jell-O Flavors (any color except red or purple)  •Avoid: juices with pulp, milk, cream, solid food, alcohol, Gum, and hard candy.  6:00pm:  Open one bottle of 12 tablets.   Fill the provided container with 16 ounces of water (up to the fill line).   Swallow each tablet with a sip of water and drink the entire amount over 15 to 20 minutes.   Approximately one hour after the last tablet is ingested, fill the provided container a second time with 16 ounces of water (up to the fill line) and drink the entire amount over 30 minutes.   Approximately 30 minutes after finishing the second container of water, fill the provided container again with 16 ounces of water (up to the fill line) and drink the entire amount over 30 minutes. Chew one Simethicone (GasX) tablet.   If you experience preparation-related symptoms (e.g. nausea, bloating, cramping), pause or slow the rate of drinking the additional water until symptoms diminish.  Continue to drink clear liquids throughout the evening but do not eat any solid food.  7 hours prior to arrival time: Open one bottle of 12 tablets.   Fill the provided container with 16 ounces of water (up to the fill line).   Swallow each tablet with a sip of water and drink the entire amount over 15 to 20 minutes.   Approximately one hour after the last tablet is ingested, fill the provided container a second time with 16 ounces of water (up to the fill line) and drink the entire amount over 30 minutes.   Approximately 30 minutes after finishing the second container of water, fill the provided container again with 16  ounces of water (up to the fill line) and drink the entire amount over 30 minutes. Chew one Simethicone (GasX) tablet and swallow two Dulcolax (Bisacodyl) tablets.  4 hours prior to your arrival time, STOP ALL LIQUIDS INCLUDING WATER AT THIS TIME.}    • Take your medications; traZODone (DESYREL), buPROPion XL (WELLBUTRIN XL),sertraline (ZOLOFT), rosuvastatin (CRESTOR)  with a sip of water 4 hours prior to your arrival time. STOP ALL LIQUIDS INCLUDING WATER AT THIS TIME.    If you are still having solid/formed stools or trouble completing this preparation, please call the doctor's office at [phone].

## 2022-09-12 NOTE — CONSULTS
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 for Alcohol Screens (Blood, Urine):    Alcohol, Serum (mg/dL)   Date Value   08/05/2022 <10       No results found for: PCDSOALCOHOL      Results on File for Urine Drug Screens (Benzodiazepines, Barbiturates, Methadone, Opiates, Cocaine, Amphetamines, THC, PCP):    No results found for: PCDSOBENZOD  No results found for: BARBITURATES  No results found for: PCDSCOMETHA  No results found for: OPIATESCREEN  No results found for: COCAINEMETAB  No results found for: AMPHETAMINES  No results found for: MARIJUANATHC  No results found for: PCDSOPHENCYN      Most Recent Results for Buprenorphine Testing:    No results found for: BUPRENORPH, NORBUPRENOR      Results on File for Phosphatidylethanol (PETH):    No results found for: PETH  PEth 16:0/18.1 (POPEth) (ng/mL)   Date Value   09/03/2022 <10   08/05/2022 99     PEth 16:0/18.2 (PLPEth) (ng/mL)   Date Value   09/03/2022 <10   08/05/2022 101         Results on File for Ethyl Glucuronide (EtG) and Ethyl Sulfate (EtS):    No results found for: THEYLGLUCU, ETHYLSULF      Most Recent Results on File for Alcohol Biomarkers (GGT, CDT):    No results found for: GGT, CDT        RELEVANT NEUROLOGIC  IMAGING:  (i.e. CT/MRI brain)      CT HEAD WITHOUT CONTRAST    Results for orders placed during the hospital encounter of 09/03/22    CT Head Without Contrast    Narrative  EXAMINATION:  CT HEAD WITHOUT CONTRAST    CLINICAL HISTORY:  ? seizure at osh;    TECHNIQUE:  Low dose axial images were obtained through the head.  Coronal and sagittal reformations were also performed. Contrast was not administered.    COMPARISON:  None.    FINDINGS:  There is an area of CSF density at the posterior fossa this may relate to an arachnoid cyst or developmental variant configuration.  There is no additional evidence for intracranial mass, mass effect or midline shift.  There is no evidence for acute intracranial hemorrhage.  There is no evidence for sulcal effacement, and no evidence for hydrocephalus.  Appropriate CSF spaces are seen at the skull base.    The mastoid air cells appear appropriate when accounting for averaging.  Mild-to-moderate paranasal sinus disease is noted.  Small air-fluid level of the right sphenoid sinus noted.  Opacity along the posterior nasal canals and nasopharynx likely relates to secretions.  There are tubular structures that may relate to enteric tube and ET tube, clinical correlation needed.  The visualized orbits appear intact.  The osseous structures appear intact.    Impression  There is no evidence for acute intracranial process.      Electronically signed by: Abdiaziz Yun  Date:    09/04/2022  Time:    01:49      MRI HEAD WITHOUT CONTRAST    No results found for this or any previous visit.      MRI HEAD WITH AND WITHOUT CONTRAST    No results found for this or any previous visit.

## 2022-09-13 LAB
ALBUMIN SERPL BCP-MCNC: 4.1 G/DL (ref 3.5–5.2)
ALP SERPL-CCNC: 90 U/L (ref 55–135)
ALT SERPL W/O P-5'-P-CCNC: 110 U/L (ref 10–44)
ANION GAP SERPL CALC-SCNC: 8 MMOL/L (ref 8–16)
ANISOCYTOSIS BLD QL SMEAR: SLIGHT
AST SERPL-CCNC: 121 U/L (ref 10–40)
BASOPHILS # BLD AUTO: 0 K/UL (ref 0–0.2)
BASOPHILS NFR BLD: 0 % (ref 0–1.9)
BILIRUB SERPL-MCNC: 6 MG/DL (ref 0.1–1)
BUN SERPL-MCNC: 33 MG/DL (ref 6–20)
BURR CELLS BLD QL SMEAR: ABNORMAL
CALCIUM SERPL-MCNC: 9 MG/DL (ref 8.7–10.5)
CHLORIDE SERPL-SCNC: 100 MMOL/L (ref 95–110)
CO2 SERPL-SCNC: 22 MMOL/L (ref 23–29)
CREAT SERPL-MCNC: 1.1 MG/DL (ref 0.5–1.4)
DIFFERENTIAL METHOD: ABNORMAL
EOSINOPHIL # BLD AUTO: 0 K/UL (ref 0–0.5)
EOSINOPHIL NFR BLD: 0 % (ref 0–8)
ERYTHROCYTE [DISTWIDTH] IN BLOOD BY AUTOMATED COUNT: 17.3 % (ref 11.5–14.5)
EST. GFR  (NO RACE VARIABLE): >60 ML/MIN/1.73 M^2
GLUCOSE SERPL-MCNC: 82 MG/DL (ref 70–110)
HCT VFR BLD AUTO: 29.3 % (ref 40–54)
HGB BLD-MCNC: 9.5 G/DL (ref 14–18)
HYPOCHROMIA BLD QL SMEAR: ABNORMAL
IMM GRANULOCYTES # BLD AUTO: 0.03 K/UL (ref 0–0.04)
IMM GRANULOCYTES NFR BLD AUTO: 0.4 % (ref 0–0.5)
INR PPP: 2.1 (ref 0.8–1.2)
LYMPHOCYTES # BLD AUTO: 0.4 K/UL (ref 1–4.8)
LYMPHOCYTES NFR BLD: 5.8 % (ref 18–48)
MAGNESIUM SERPL-MCNC: 2.2 MG/DL (ref 1.6–2.6)
MCH RBC QN AUTO: 28 PG (ref 27–31)
MCHC RBC AUTO-ENTMCNC: 32.4 G/DL (ref 32–36)
MCV RBC AUTO: 86 FL (ref 82–98)
MONOCYTES # BLD AUTO: 0.7 K/UL (ref 0.3–1)
MONOCYTES NFR BLD: 9.5 % (ref 4–15)
NEUTROPHILS # BLD AUTO: 6.1 K/UL (ref 1.8–7.7)
NEUTROPHILS NFR BLD: 84.3 % (ref 38–73)
NRBC BLD-RTO: 0 /100 WBC
PHOSPHATE SERPL-MCNC: 1.6 MG/DL (ref 2.7–4.5)
PLATELET # BLD AUTO: 37 K/UL (ref 150–450)
PMV BLD AUTO: ABNORMAL FL (ref 9.2–12.9)
POIKILOCYTOSIS BLD QL SMEAR: SLIGHT
POLYCHROMASIA BLD QL SMEAR: ABNORMAL
POTASSIUM SERPL-SCNC: 3.6 MMOL/L (ref 3.5–5.1)
PROT SERPL-MCNC: 6.6 G/DL (ref 6–8.4)
PROTHROMBIN TIME: 20.6 SEC (ref 9–12.5)
RBC # BLD AUTO: 3.39 M/UL (ref 4.6–6.2)
SODIUM SERPL-SCNC: 130 MMOL/L (ref 136–145)
WBC # BLD AUTO: 7.19 K/UL (ref 3.9–12.7)

## 2022-09-13 PROCEDURE — 63600175 PHARM REV CODE 636 W HCPCS: Performed by: HOSPITALIST

## 2022-09-13 PROCEDURE — 99232 PR SUBSEQUENT HOSPITAL CARE,LEVL II: ICD-10-PCS | Mod: ,,, | Performed by: INTERNAL MEDICINE

## 2022-09-13 PROCEDURE — 85610 PROTHROMBIN TIME: CPT | Performed by: NURSE PRACTITIONER

## 2022-09-13 PROCEDURE — 27000207 HC ISOLATION

## 2022-09-13 PROCEDURE — 20600001 HC STEP DOWN PRIVATE ROOM

## 2022-09-13 PROCEDURE — 85025 COMPLETE CBC W/AUTO DIFF WBC: CPT | Performed by: NURSE PRACTITIONER

## 2022-09-13 PROCEDURE — 99232 SBSQ HOSP IP/OBS MODERATE 35: CPT | Mod: ,,, | Performed by: INTERNAL MEDICINE

## 2022-09-13 PROCEDURE — 25000003 PHARM REV CODE 250: Performed by: STUDENT IN AN ORGANIZED HEALTH CARE EDUCATION/TRAINING PROGRAM

## 2022-09-13 PROCEDURE — 84100 ASSAY OF PHOSPHORUS: CPT | Performed by: NURSE PRACTITIONER

## 2022-09-13 PROCEDURE — 83735 ASSAY OF MAGNESIUM: CPT | Performed by: NURSE PRACTITIONER

## 2022-09-13 PROCEDURE — 80053 COMPREHEN METABOLIC PANEL: CPT | Performed by: NURSE PRACTITIONER

## 2022-09-13 PROCEDURE — 25000003 PHARM REV CODE 250

## 2022-09-13 PROCEDURE — 94761 N-INVAS EAR/PLS OXIMETRY MLT: CPT

## 2022-09-13 PROCEDURE — 99232 PR SUBSEQUENT HOSPITAL CARE,LEVL II: ICD-10-PCS | Mod: ,,, | Performed by: HOSPITALIST

## 2022-09-13 PROCEDURE — 25000003 PHARM REV CODE 250: Performed by: HOSPITALIST

## 2022-09-13 PROCEDURE — P9047 ALBUMIN (HUMAN), 25%, 50ML: HCPCS | Mod: JG | Performed by: HOSPITALIST

## 2022-09-13 PROCEDURE — 99232 SBSQ HOSP IP/OBS MODERATE 35: CPT | Mod: ,,, | Performed by: HOSPITALIST

## 2022-09-13 PROCEDURE — 36415 COLL VENOUS BLD VENIPUNCTURE: CPT | Performed by: NURSE PRACTITIONER

## 2022-09-13 RX ORDER — SODIUM,POTASSIUM PHOSPHATES 280-250MG
2 POWDER IN PACKET (EA) ORAL
Status: DISPENSED | OUTPATIENT
Start: 2022-09-13 | End: 2022-09-14

## 2022-09-13 RX ADMIN — POTASSIUM & SODIUM PHOSPHATES POWDER PACK 280-160-250 MG 2 PACKET: 280-160-250 PACK at 04:09

## 2022-09-13 RX ADMIN — FOLIC ACID 1 MG: 1 TABLET ORAL at 08:09

## 2022-09-13 RX ADMIN — RIFAXIMIN 550 MG: 550 TABLET ORAL at 08:09

## 2022-09-13 RX ADMIN — ALBUMIN (HUMAN) 25 G: 0.25 INJECTION, SOLUTION INTRAVENOUS at 08:09

## 2022-09-13 RX ADMIN — SPIRONOLACTONE 100 MG: 100 TABLET ORAL at 08:09

## 2022-09-13 RX ADMIN — Medication 100 MG: at 08:09

## 2022-09-13 RX ADMIN — POTASSIUM & SODIUM PHOSPHATES POWDER PACK 280-160-250 MG 2 PACKET: 280-160-250 PACK at 12:09

## 2022-09-13 RX ADMIN — ALBUMIN (HUMAN) 25 G: 0.25 INJECTION, SOLUTION INTRAVENOUS at 01:09

## 2022-09-13 RX ADMIN — LACTULOSE 20 G: 20 SOLUTION ORAL at 04:09

## 2022-09-13 RX ADMIN — PIPERACILLIN SODIUM AND TAZOBACTAM SODIUM 4.5 G: 4; .5 INJECTION, POWDER, LYOPHILIZED, FOR SOLUTION INTRAVENOUS at 04:09

## 2022-09-13 RX ADMIN — PANTOPRAZOLE SODIUM 40 MG: 40 TABLET, DELAYED RELEASE ORAL at 08:09

## 2022-09-13 RX ADMIN — FUROSEMIDE 20 MG: 20 TABLET ORAL at 08:09

## 2022-09-13 RX ADMIN — POTASSIUM & SODIUM PHOSPHATES POWDER PACK 280-160-250 MG 2 PACKET: 280-160-250 PACK at 08:09

## 2022-09-13 RX ADMIN — LACTULOSE 20 G: 20 SOLUTION ORAL at 08:09

## 2022-09-13 NOTE — PHYSICIAN QUERY
PT Name: Porfirio Wilson  MR #: [id number]     DOCUMENTATION CLARIFICATION     CDS/: SID Marcum, RN, CDS              Contact information:[email or web address]   This form is a permanent document in the medical record.    Query Date: September 13, 2022    By submitting this query, we are merely seeking further clarification of documentation.  Please utilize your independent clinical judgment when addressing the question(s) below.  The Medical Record contains the following:   Indicators   Supporting Clinical Findings Location in Medical Record   X Pneumonia documented  Acute hypoxic respiratory failure: related to right pleural fluid causing midline shift (presumed hepatohydrothorax) vs COVID PNA vs superimposed bacterial PNA.      Covid-19 positive (pt not vaccinated); concern for superimposed bacterial pna given leukocytosis and elevated procal -completed 4 days of pip-tazo    H&P, LAZARUS Perez NP/Dr. Castro, 9/3       , Dr. Palmer, 9/9   X Chest X-Ray/CT Scan  CXR impression:  Airspace opacities in the bilateral lungs compatible with multifocal pneumonia.        CT chest with patchy multilobar opacities; COVID vs superimposed bacterial infection.       chest CT with bilateral patchy infiltrates consistent with Covid-19    CXR 9/3       LAZARUS ODEN NP/Dr. Castro, 9/3     [facility], Dr. Garay/Dr. Castro, 9/5   X PaO2    PaCO2     O2 sat  Resp:  [16] 16   SpO2:  [99 %-100 %] 99 %     Resp:  [20-33] 33  SpO2:  [94 %-100 %] 94 %    HEnaPLAZARUS NP/Dr. Castro, 9/3   X WBC  09/03 09/04 09/05 09/06 09/07 09/08 09/09 09/10 09/11 09/12   WBC 8.32 10.06 14.19 (H) 11.09 11.08 10.27 6.47 6.94 5.61 6.85       Lab 9/3-9/12   X Vital Signs Temp:  [97.3 °F (36.3 °C)] 97.3 °F (36.3 °C)  Pulse:  [60-70] 60  Resp:  [16] 16  SpO2:  [99 %-100 %] 99 %  BP: (95-96)/(57-58) 95/58    H&PLAZARUS NP/Dr. Castro, 9/3       [facility], Dr. Garay/Dr. Castro, 9/5   X Cultures   Respiratory culture: no growth    Lab 9/4    X Treatment   COVID Isolation    Dex/Remdesivir   Levaquin     start dex + remdesivir; consider adding baricitinib on hospital day 2 if no improvement on dex/remdesivir   continue abx coverage for suspected bacterial pna   maintain chest tube to suction     continue Levaquin for suspected bacterial pna - abx broadened due to hypothermia    MARIBETH&P, LAZARUS Perez, NP/Dr. Castro, 9/3                   Dr. Tanisha ALVARADO/Dr. Castro, 9/5   X Supplemental O2  Intubated    H&PLAZARUS, NP/Dr. Castro, 9/3    Dysphagia/Swallow study     X Other  COVID-19 infection:  Chest imaging with bilateral airspace disease.  Currently on dexamethasone and remdesivir.  Unclear what clinical significance this has given overlapping illnesses    [facility], Dr. Garay/Dr. Douglass, 9/6     Provider, please further specify the Pneumonia diagnosis related to the above clinical indicators:    [  X ] COVID-19 Pneumonia     [  x ] Bacterial Pneumonia     [   ] COVID-19 Pneumonia with Superimposed Bacterial Pneumonia     [   ] Other type of pneumonia (please specify): ________     [   ] Other respiratory diagnosis (please specify): _________     [  ] Clinically undetermined         Please document in your progress notes daily for the duration of treatment, until resolved, and include in your discharge summary.     Form No. [id number]

## 2022-09-13 NOTE — PROGRESS NOTES
[facility]  Hepatology  Progress note    Patient Name: Porfirio Wilson  MRN: [id number]  Admission Date: 9/3/2022  Hospital Length of Stay: 10 days  Code Status: Full Code   Attending Provider: Tatiana Palmer MD   Consulting Provider: Elizabeth Carson MD  Primary Care Physician: No primary care provider on file.  Principal Problem:Acute hypoxemic respiratory failure    Subjective:     HPI: Porfirio Wilson is a 37 y.o. male with history of alcoholic cirrhosis complicated by esophageal varices, pancreatitis who presents as a transfer from [facility] for hepatology evaluation.    The patient initially presented to the hospital 9 3 with shortness of breath, fevers and chills.  He was found to have right hydropneumothorax and pulmonary consolidation.  A chest tube was placed and he was started on antibiotics.  He was intubated requiring pressors.  Initially was too sick for consideration for transplant evaluation.  He has since been extubated and is hemodynamically stable.  Continues to have a chest tube with decreasing output.  Denies significant abdominal distention reports that it has improved.  No peripheral edema.  Reports that he was initially diagnosed with liver disease many years ago however was never told that he specifically has cirrhosis.  He has been told to quit drinking in the past since it may worsen his liver disease.  He has been drinking heavily since age 22.  His 1st hospitalization for alcohol-related issues was pancreatitis about 8 years ago.  He completed inpatient rehab with detox about 6 years ago and was sober for some time prior to relapsing.  Most recently he was admitted to the hospital in the beginning of August with hematemesis secondary to large esophageal varices that were banded.  He was subsequently transferred here and was deemed not a transplant candidate at this time due to active alcohol consumption.  He reports that he has not had any alcohol since prior to that  admission.  He is motivated to remain sober and planned in enrolling in a rehab program      Past Medical History:   Diagnosis Date    GERD (gastroesophageal reflux disease)     History of alcohol abuse        Past Surgical History:   Procedure Laterality Date    EGD, WITH BANDING OF VARICES         Family History   Family history unknown: Yes       Social History     Socioeconomic History    Marital status: Single   Tobacco Use    Smoking status: Every Day     Types: Cigarettes    Smokeless tobacco: Never   Substance and Sexual Activity    Alcohol use: Yes     Comment: pint of vodka every day     Drug use: Yes     Types: Marijuana    Sexual activity: Not Currently       No current facility-administered medications on file prior to encounter.     Current Outpatient Medications on File Prior to Encounter   Medication Sig Dispense Refill    ferrous sulfate 325 (65 FE) MG EC tablet Take 1 tablet (325 mg total) by mouth once daily. 30 tablet 2    folic acid (FOLVITE) 1 MG tablet Take 1 tablet (1 mg total) by mouth once daily. 30 tablet 2    lactulose (CHRONULAC) 10 gram/15 mL solution Take 15 mLs (10 g total) by mouth 3 (three) times daily. TITRATE TO 3-4 BOWEL MOVEMENTS DAILY. 1350 mL 2    propranoloL (INDERAL) 10 MG tablet Take 1 tablet (10 mg total) by mouth 2 (two) times daily. 60 tablet 2    spironolactone (ALDACTONE) 100 MG tablet Take 1 tablet by mouth once daily.      thiamine 100 MG tablet Take 1 tablet (100 mg total) by mouth once daily. 30 tablet 2       Review of patient's allergies indicates:   Allergen Reactions    Ampicillin      Tolerated Zosyn 09/2022    Ceclor [cefaclor]      Tolerated Zosyn 09/2022         Objective:     Vitals:    09/13/22 0803   BP: (!) 104/51   Pulse: 68   Resp: 18   Temp: 97.8 °F (36.6 °C)         Constitutional:  not in acute distress, thin  HENT: Head: Normal, normocephalic, atraumatic.  Eyes: conjunctiva clear and sclera icteric  Cardiovascular: regular rate and  rhythm  Respiratory: normal chest expansion & respiratory effort   and no accessory muscle use  GI: soft, non-tender, without masses or organomegaly  Musculoskeletal: no muscular tenderness noted  Skin: normal color  Neurological: alert, oriented x3  Psychiatric: mood and affect are within normal limits, pt is a good historian; no memory problems were noted     Significant Labs:  Recent Labs   Lab 09/11/22  0541 09/12/22  0332 09/13/22  0504   HGB 10.3* 9.3* 9.5*       Lab Results   Component Value Date    WBC 7.19 09/13/2022    HGB 9.5 (L) 09/13/2022    HCT 29.3 (L) 09/13/2022    MCV 86 09/13/2022    PLT 37 (LL) 09/13/2022       Lab Results   Component Value Date     (L) 09/13/2022    K 3.6 09/13/2022     09/13/2022    CO2 22 (L) 09/13/2022    BUN 33 (H) 09/13/2022    CREATININE 1.1 09/13/2022    CALCIUM 9.0 09/13/2022    ANIONGAP 8 09/13/2022       Lab Results   Component Value Date     (H) 09/13/2022     (H) 09/13/2022    ALKPHOS 90 09/13/2022    BILITOT 6.0 (H) 09/13/2022       Lab Results   Component Value Date    INR 2.1 (H) 09/13/2022    INR 2.0 (H) 09/12/2022    INR 1.9 (H) 09/11/2022           Significant Imaging:  Reviewed pertinent radiology findings.       Assessment/Plan:       MELD-Na score: 26 at 9/13/2022  5:04 AM  MELD score: 22 at 9/13/2022  5:04 AM  Calculated from:  Serum Creatinine: 1.1 mg/dL at 9/13/2022  5:04 AM  Serum Sodium: 130 mmol/L at 9/13/2022  5:04 AM  Total Bilirubin: 6.0 mg/dL at 9/13/2022  5:04 AM  INR(ratio): 2.1 at 9/13/2022  5:04 AM  Age: 37 years    Problem List:  Decompensated alcoholic cirrhosis  Pneumo hydrothorax    Assessment:  The patient presents with hypoxia secondary to hydropneumothorax, cause unclear, but could be hepatic hydrothorax in which case long-term chest tube is not indicated.  The patient's meld is elevated, a 26 today mainly driven by INR and bilirubin although sodium is also contributing.  Would recommend vitamin K.  He is not a  liver transplant candidate at this time due to recent alcohol use despite knowledge of liver disease as well as failed rehab attempt in the past.  Would recommend involvement in IOP prior to transplant evaluation.    Plan:  - vitamin K x3  - chest tube removal per primary team  - abstinence from alcohol, explained to the patient the importance of maintaining sobriety and enrollment in rehab, also advised about possibility of virtual sessions  - continue diuretics  - will need outpatient EGD for variceal surveillance, unlikely to be a candidate for beta blockers at this time due to his low blood pressure    Please obtain daily CBC, BMP, LFT, INR  Thank you for involving us in the care of Porfirio Wilson. Please call with any additional questions, concerns or changes in the patient's clinical status.    Elizabeth Carson MD  Gastroenterology & Hepatology Fellow PGY VI  [facility]

## 2022-09-13 NOTE — PLAN OF CARE
Met with patient at bedside and discussed current therapy recommendations for inpatient rehab. Patient reports he prefers outpatient rehab at [place].    Xuan Landry LMSW  [facility]  Ext. [phone]

## 2022-09-13 NOTE — PROGRESS NOTES
Progress Note  Hospital Medicine      Patient Name: Porfirio Wilson  MRN: [id number]  Admission Date: 9/3/2022    SUBJECTIVE:     Follow-up For:  Acute hypoxemic respiratory failure     Interval history/ROS:   9/13: output decreasing from chest tube. On room air, has outpaitient alcohol treatment info     9/12: decreased chest tube output    9/11l: No acute issues overnight    9/10: patient on room air during rounding.        9/9: discussed with CTS - will need to leave chest tube in for now unclamped. 5 L output documented over 24 hours.     HPI:  Patient is a 36 y.o. male with significant past medical history of ETOH cirrhosis (complicated by presence of esophageal varices s/p band ligation), bipolar d/o and pancreatitis presented to [facility] on 9/3 complaining of shortness of breath x2 hours with associated fever/chills, cough and diaphoresis. Patient was intubated in the ED due to degree of respiratory distress. Cxry showed right pleural effusion and pneumothorax and focal consolidation on the left. He was found to be Covid positive. Patient had a right side chest tube placed. Labs at OSH significant for leukocytosis of 16, procal 0.46, lactic 5, , INR 2.1, negative troponin, ETOH negative and ammonia 86.  Pt was tx w/ levaquin & flagyl, as well as given IVF & vit K for coagulopathy associated w/ liver dz. Pt is currently requiring levo for map > 65. Last reported ETOH drink ~ 6 weeks ago.      Patient has been transferred to [facility] for higher level of care and Hepatology services.     OBJECTIVE:     Body mass index is 21.89 kg/m².    Vital Signs Range (Last 24H):  Temp:  [97.5 °F (36.4 °C)-98.2 °F (36.8 °C)]   Pulse:  [60-71]   Resp:  [18]   BP: (104-106)/(51-58)   SpO2:  [96 %-99 %]     I & O (Last 24H):    Intake/Output Summary (Last 24 hours) at 9/13/2022 1156  Last data filed at 9/13/2022 1015  Gross per 24 hour   Intake 1630 ml   Output 610 ml   Net 1020 ml          Physical  Exam:  Vitals and nursing note reviewed.   Constitutional:       Appearance: Normal appearance. He is not ill-appearing.   HENT:      Head: Normocephalic and atraumatic.      Right Ear: External ear normal.      Left Ear: External ear normal.      Nose: Nose normal.      Mouth/Throat:      Mouth: Mucous membranes are moist.      Pharynx: Oropharynx is clear.   Eyes:      General: Scleral icterus present.      Pupils: Pupils are equal, round, and reactive to light.   Cardiovascular:      Rate and Rhythm: Regular rhythm. Bradycardia present.      Pulses: Normal pulses.      Heart sounds: Normal heart sounds. No murmur heard.  Pulmonary:      Effort: Pulmonary effort is normal.      Breath sounds: Normal breath sounds.      Comments: R surgical chest tube, dressing c/d/i  Abdominal:      General: Bowel sounds are normal. There is no distension.      Palpations: Abdomen is soft.      Tenderness: There is no abdominal tenderness.   Musculoskeletal:         General: No swelling or deformity.      Cervical back: Normal range of motion and neck supple.      Right lower leg: No edema.      Left lower leg: No edema.   Skin:     General: Skin is dry.      Capillary Refill: Capillary refill takes less than 2 seconds.      Coloration: Skin is jaundiced.   Neurological:      General: No focal deficit present.      Mental Status: He is alert and oriented to person, place, and time.        Recent Labs   Lab 09/11/22  0541 09/12/22  0331 09/13/22  0504   * 129* 130*   K 4.2 3.8 3.6   CL 99 99 100   CO2 22* 24 22*   BUN 39* 34* 33*   CREATININE 1.2 0.9 1.1   * 129* 82   CALCIUM 8.7 8.4* 9.0   MG 2.0 2.1 2.2   PHOS 2.8 2.2* 1.6*       Recent Labs   Lab 09/11/22  0541 09/12/22  0331 09/13/22  0504   ALKPHOS 84 88 90   ALT 69* 96* 110*   AST 79* 117* 121*   ALBUMIN 3.5 3.6 4.1   PROT 6.9 6.4 6.6   BILITOT 6.9* 6.0* 6.0*   INR 1.9* 2.0* 2.1*         Recent Labs   Lab 09/11/22  0541 09/12/22  0332 09/13/22  0504   WBC 5.61  6.85 7.19   HGB 10.3* 9.3* 9.5*   HCT 31.8* 28.6* 29.3*   PLT 40* 35* 37*   GRAN 87.9*  4.9 91.2*  6.2 84.3*  6.1   LYMPH 7.0*  0.4* 4.5*  0.3* 5.8*  0.4*   MONO 4.6  0.3 3.9*  0.3 9.5  0.7         No results for input(s): POCTGLUCOSE in the last 168 hours.    No results for input(s): TROPONINI in the last 168 hours.      Diagnostic Results:  Labs: Reviewed    folic acid, 1 mg, Oral, Daily  furosemide, 20 mg, Oral, Daily  lactulose, 20 g, Oral, TID  pantoprazole, 40 mg, Oral, Daily  piperacillin-tazobactam (ZOSYN) IVPB, 4.5 g, Intravenous, Q8H  potassium, sodium phosphates, 2 packet, Oral, QID (AC & HS)  rifAXImin, 550 mg, Oral, BID  spironolactone, 100 mg, Oral, Daily  thiamine, 100 mg, Oral, Daily      As Needed acetaminophen, aluminum & magnesium hydroxide-simethicone, methocarbamoL, sodium chloride 0.9%, traMADoL    ASSESSMENT/PLAN:     Assessment: Porfirio Wilson is a 37 y.o. male here with:     Active Hospital Problems    Diagnosis  POA    *Acute hypoxemic respiratory failure [J96.01]  Yes    Alcohol use disorder, severe, dependence [F10.20]  Yes     Chronic    Urinary retention [R33.9]  Yes    Oropharyngeal bleeding [J39.2]  No    Seizure [R56.9]  Yes    Decompensated hepatic cirrhosis [K72.90, K74.60]  Yes      Resolved Hospital Problems   No resolved problems to display.        Plan:     Seizure  --reported to have full tonic-clonic seizure like activity lasting ~ 1 min and resolved spontaneously at onset of transfer from OSH per EMS  --head CT negative for acute intracranial abnormality       Oropharyngeal bleeding Resolved  --~ 100cc blood from oropharynx night of transfer  --no blood from ETT and no blood from OGT  --? If pt bit tongue during above seizure like activity  --continue daily ppi; if concern develops for GIB will change to IV protonix bid & consult GI      Acute hypoxemic respiratory failure Resolved on room air  Patient with Hypoxic Respiratory failure which is Acute.  he is not on  home oxygen. Supplemental oxygen was provided and noted-  .   Signs/symptoms of respiratory failure include- tachypnea, increased work of breathing and respiratory distress. Contributing diagnoses includes - Pleural effusion, Pneumonia and pneumothorax Labs and images were reviewed. Patient Has not had a recent ABG. Will treat underlying causes and adjust management of respiratory failure as follows-   --Covid-19 positive (pt not vaccinated); concern for superimposed bacterial pna given leukocytosis and elevated procal -completed 4 days of pip-tazo  --initial chest CT with bilateral patchy infiltrates consistent with Covid-19  --right plural effusion (suspect hepatohydrothorax) + pneumothorax (unclear if procedural complication) s/p chest tube placement at OSH. CT imaging concerning for chest tube in lung parenchyma vs lung re-expansion around tube. CTS evaluation with recs for stabilization prior to any intervention    --continue dex, remdesivir completed  --minimal residual ptx on chest CT.   --f/u blood and sputum cx; f/u results & tailor abx as appropriate - vancomycin discontinued 9/7, pip-tazo continued as ppx  --- given diarrhea, checking legionella urine antigen - pending  - 9/9: CT reviewed, discussed with CTS - tube to remain in place unclamped  - 9/13: output better and patient on room air    Decompensated hepatic cirrhosis  2/2 alcoholic cirrhosis. C/b variceal hemorrhage and possible HE. 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 .     MELD-Na score: 26 at 9/13/2022  5:04 AM  MELD score: 22 at 9/13/2022  5:04 AM  Calculated from:  Serum Creatinine: 1.1 mg/dL at 9/13/2022  5:04 AM  Serum Sodium: 130 mmol/L at 9/13/2022  5:04 AM  Total Bilirubin: 6.0 mg/dL at 9/13/2022  5:04 AM  INR(ratio): 2.1 at 9/13/2022  5:04 AM  Age: 37 years    --OSH ammonia 87; continue lactulose - no BMs x 2 days but with diarrhea for past 2 days - holding lactulose  --f/u PETH  --holding home propranolol in  setting of shock requiring vasopressors - can resume as clinically appropriate  - adding lasix and spironolactone for continued chest tube drainage 2/2 hepatic hydrothorax  - compelted albumin infusion for volume loss 2/2 hydrothorax (equivalent to large volume paracentesis)  --Hepatology consult, appreciate assistance - patient not a transplant candidate at this time due to continued alcohol use  - meld may preclude TIPS candidacy          HIGH RISK CONDITION(S):  Patient has a condition that poses threat to life and bodily function: decompensated liver disease        Tatiana Palmer MD

## 2022-09-13 NOTE — PT/OT/SLP PROGRESS
Physical Therapy      Patient Name:  Porfirio Wilson   MRN:  [id number]    Patient not seen today secondary to pt in care of nursing on attempt. Will follow-up per PT POC.

## 2022-09-13 NOTE — PHYSICIAN QUERY
PT Name: Porfirio Wilson  MR #: [id number]     DOCUMENTATION CLARIFICATION     CDS/: SID Marcum, RN, CDS              Contact information:[email or web address]   This form is a permanent document in the medical record.     Query Date: September 13, 2022    By submitting this query, we are merely seeking further clarification of documentation.  Please utilize your independent clinical judgment when addressing the question(s) below.  The Medical Record contains the following:  Indicators Supporting Clinical Findings Location in Medical Record   X Acute Illness (e.g. AMI, Sepsis, etc.) Shock: septic vs sedation related hypotension.  Continue with low dose levophed    COVID    Acute hypoxic respiratory failure: related to right pleural fluid causing midline shift (presumed hepatohydrothorax) vs COVID PNA vs superimposed bacterial PNA.      Decompensated alcoholic cirrhosis: MELD 27    Seizure activity    H&P, LAZARUS Perez NP/Dr. Castro, 9/3   X Vital Signs  Vital Signs (24h Range):  Temp:  [97.3 °F (36.3 °C)] 97.3 °F (36.3 °C)  Pulse:  [60-70] 60  Resp:  [16] 16  SpO2:  [99 %-100 %] 99 %  BP: (95-96)/(57-58) 95/58    Temp:  [93.7 °F (34.3 °C)-97.3 °F (36.3 °C)] 93.7 °F (34.3 °C)  Pulse:  [57-88] 63  Resp:  [16-30] 20  SpO2:  [95 %-100 %] 98 %  BP: ()/(52-65) 110/65    Vital Signs (24h Range):  Temp:  [96.1 °F (35.6 °C)-99.5 °F (37.5 °C)] 98.2 °F (36.8 °C)  Pulse:  [46-98] 98  Resp:  [17-42] 26  SpO2:  [92 %-100 %] 92 %  BP: ()/(53-70) 96/55   H&P, LAZARUS Perez NP/Dr. Castro, 9/3              CCM, LAZARUS Perez NP/Dr. Castro, 9/4              CCM, Dr. Garay/Dr. Douglass, 9/6    Acidosis documented     X ABGs/Labs  09/03 09/04 09/05 09/06 09/07 09/09 09/10 09/11 09/12   WBC 8.32 10.06 14.19 (H) 11.09 11.08 6.47 6.94 5.61 6.85     Blood culture:  No growth    Urine culture: No growth    Respiratory culture: No growth     09/03 09/04 09/09   Lactate, Joaquín 2.7 (H) 2.2 2.9 (H)      09/03   Procalcitonin 0.94  (H)      Lab 9/3-9/12        Lab 9/4    Lab 9/4    Lab 9/4      Lab 9/3-9/9        Lab 9/3   X Hypotension or Low Blood Pressure documented Shock: septic vs sedation related hypotension.  Continue with low dose levophed   H&PLAZARUS NP/Dr. Castro, 9/3   X Altered Mental Status or Confusion Encephalopathy: likely related to acute illness [facility], Dr. Garay/Dr. Douglass, 9/7   X Diaphoresis, Cold Extremities or Cyanosis Cool lower extremities  H&PLAZARUS NP/Dr. Castro, 9/3    Oliguria     X Medication/Treatment  -Vasopressors  -Inotropic Drugs  -IV Fluids   -IV Antibiotics  -Cardiac Assist Devices  -Hemodynamic Monitoring  -Blood/Blood Products Norepinephrine continuous IV    Low dose Levophed  Levaquin  COVID Isolation  Dex/Remdesivir   Intubated   holding home propranolol in setting of shock requiring vasopressors    pt maintained on mechanical ventilation/levo as needed for map > 65Improving with decreased need for pressors, though failing SBT.     Minimize sedation     Extubated to NC    Completed 4 days of pip-tazo  Dex, remdesivir completed  Holding home propranolol in setting of shock requiring vasopressors - can resume as clinically appropriate   MAR 9/3-9/8    LAZARUS ODEN NP/Dr. Castro, 9/3                [facility], Dr. Garay/Dr. Douglass, 9/6            [facility], Dr. Garay/Dr. Douglass, 9/7    , Dr. Palmer, 9/9          Other       Provider, please Clarify the Shock Diagnosis associated with above clinical findings.    [  xx  ] Shock Ruled In due to Sepsis   Please specify source of Sepsis   [ x  ] Pneumonia   [   ] Other source, please specify:__________   [   ] Clinically undetermined         [    ] Other Shock Ruled In (please specify type): __________     [   ] Unspecified Shock     [    ] Shock Ruled Out, Sedation Related Hypotension     [    ] Other Condition (please specify): _________     [  ] Clinically Undetermined             Please document in your progress notes daily for the duration of  treatment until resolved and include in your discharge summary.     Form No. [id number]

## 2022-09-13 NOTE — PHYSICIAN QUERY
PT Name: Porfirio Wilson  MR #: [id number]    DOCUMENTATION CLARIFICATION     CDS/: SID Marcum, RN, CDS               Contact information:[email or web address]   This form is a permanent document in the medical record.     Query Date: September 13, 2022    By submitting this query, we are merely seeking further clarification of documentation. Please utilize your independent clinical judgment when addressing the question(s) below.    The Medical Record contains the following:   Indicators   Supporting Clinical Findings Location in Medical Record   X AMS, Confusion,  LOC, etc.   Mental status precluding extubation at this time     Encephalopathy: likely related to acute illness     Extubated to NC. Still encephalopathic but responding appropriately     Possible HE    [facility], Dr. Garay/Dr. Douglass, 9/6     [facility], Dr. Garay/Dr. Douglass, 9/7           , Dr. Palmer, 9/9   X Acute/Chronic Illness  Acute hypoxic respiratory failure: related to right pleural fluid causing midline shift (presumed hepatohydrothorax) vs COVID PNA vs superimposed bacterial PNA, Shock: septic vs sedation related hypotension, Decompensated alcoholic cirrhosis, seizure activity, COVID     PMH alcoholic cirrhosis complicated by varices and hx of variceal bleed/band ligation, bipolar do, and pancreatitis      Decompensated hepatic cirrhosis  2/2 alcoholic cirrhosis. C/b variceal hemorrhage and possible HE    H&P, LAZARUS Perez NP/Dr. Castro, 9/3                       , Dr. Palmer, 9/9   X Radiology Findings  CT head impression:  There is no evidence for acute intracranial process.    CT 9/4    Electrolyte Imbalance      Medication     X Treatment          Continue with lactulose   Vitamin K x 3 days   Low dose Levophed   Levaquin   COVID Isolation   Dex/Remdesivir    Intubated    Holding home propranolol in setting of shock requiring vasopressors     Mental status precluding extubation at this time.  Minimize sedation.     Delirium  precautions    H&PLAZARUS, VANESSA/Dr. Castro, 9/3                      [facility], Dr. Garay/Dr. Douglass, 9/6       CCM, Dr. Garay/Dr. Douglass, 9/7   X Other  Labs at OSH significant for leukocytosis of 16, procal 0.46, lactic 5, , INR 2.1, negative troponin, ETOH negative and ammonia 86.  Pt was tx w/ levaquin & flagyl, as well as given IVF & vit K for coagulopathy associated w/ liver dz      H&P, LAZARUS Perez, NP/Dr. Castro, 9/3                 The noted clinical guidelines are only system guidelines and do not replace the providers clinical judgment.    The National [place] of Neurologic Disorders and Stroke (NINDS) of the NIH describes encephalopathy as any diffuse disease of the brain that alters brain function or structure.    Provider, please further specify the Encephalopathy Diagnosis associated with above clinical findings. Check all that apply:    [  x ] Metabolic Encephalopathy - Due to electrolyte imbalance, metabolic derangements, or infectious processes, includes Septic Encephalopathy, Uremic Encephalopathy     [   ] Hepatic Encephalopathy - Due to liver disease/failure   Please clarify acuity/chronicity:   [   ] Acute   [   ] Acute on chronic   [   ] Chronic   [   ] Other, please specify:____________   [   ] Clinically undetermined     [   ] Encephalopathy, unspecified        [   ] Other Encephalopathy (please specify): ____________________     [   ] Other neurological condition- Includes Post-ictal altered mental status (please specify condition): __________     [   ]  Clinically Undetermined         Please document in your progress notes daily for the duration of treatment until resolved, and include in your discharge summary.    References:  BRYNN Sykes RN, CCDS. (2018, June 9). Notes from the Instructor: Encephalopathy tips. Retrieved October 22, 2020, from [email or web address].[email or web address]/articles/[email or web address]-[email or web address]-encephalopathy-tips    ICD-9-CM Coding Clinic First Quarter 2013, Effective with  discharges: October 21, 2013 [facility] § Seizure with encephalopathy due to postictal state (2013).    ICD-10-CM/PCS [employer] Integrated Codebook (Version V.20.8.10.0) [Computer software]. (2020). Retrieved October 21, 2020.    National [place] of Neurological Disorders and Stroke. (2019, March 27). Retrieved October 22, 2020, from https://www.ninds.nih.gov/Disorders/All-Disorders/Qvmgqlhwomvglw-Autmwoalmdz-Xrci    Form No. [id number]

## 2022-09-14 LAB
ALBUMIN SERPL BCP-MCNC: 4 G/DL (ref 3.5–5.2)
ALP SERPL-CCNC: 89 U/L (ref 55–135)
ALT SERPL W/O P-5'-P-CCNC: 126 U/L (ref 10–44)
ANION GAP SERPL CALC-SCNC: 8 MMOL/L (ref 8–16)
AST SERPL-CCNC: 126 U/L (ref 10–40)
BASOPHILS # BLD AUTO: 0 K/UL (ref 0–0.2)
BASOPHILS NFR BLD: 0 % (ref 0–1.9)
BILIRUB SERPL-MCNC: 6.2 MG/DL (ref 0.1–1)
BUN SERPL-MCNC: 34 MG/DL (ref 6–20)
CALCIUM SERPL-MCNC: 8.7 MG/DL (ref 8.7–10.5)
CHLORIDE SERPL-SCNC: 101 MMOL/L (ref 95–110)
CO2 SERPL-SCNC: 23 MMOL/L (ref 23–29)
CREAT SERPL-MCNC: 1.1 MG/DL (ref 0.5–1.4)
DIFFERENTIAL METHOD: ABNORMAL
EOSINOPHIL # BLD AUTO: 0.1 K/UL (ref 0–0.5)
EOSINOPHIL NFR BLD: 1.5 % (ref 0–8)
ERYTHROCYTE [DISTWIDTH] IN BLOOD BY AUTOMATED COUNT: 17.7 % (ref 11.5–14.5)
EST. GFR  (NO RACE VARIABLE): >60 ML/MIN/1.73 M^2
GLUCOSE SERPL-MCNC: 125 MG/DL (ref 70–110)
HCT VFR BLD AUTO: 30.2 % (ref 40–54)
HGB BLD-MCNC: 9.8 G/DL (ref 14–18)
IMM GRANULOCYTES # BLD AUTO: 0.02 K/UL (ref 0–0.04)
IMM GRANULOCYTES NFR BLD AUTO: 0.3 % (ref 0–0.5)
INR PPP: 1.9 (ref 0.8–1.2)
LYMPHOCYTES # BLD AUTO: 0.7 K/UL (ref 1–4.8)
LYMPHOCYTES NFR BLD: 9 % (ref 18–48)
MAGNESIUM SERPL-MCNC: 2.2 MG/DL (ref 1.6–2.6)
MCH RBC QN AUTO: 29.1 PG (ref 27–31)
MCHC RBC AUTO-ENTMCNC: 32.5 G/DL (ref 32–36)
MCV RBC AUTO: 90 FL (ref 82–98)
MONOCYTES # BLD AUTO: 0.8 K/UL (ref 0.3–1)
MONOCYTES NFR BLD: 10.2 % (ref 4–15)
NEUTROPHILS # BLD AUTO: 5.9 K/UL (ref 1.8–7.7)
NEUTROPHILS NFR BLD: 79 % (ref 38–73)
NRBC BLD-RTO: 0 /100 WBC
PHOSPHATE SERPL-MCNC: 1.7 MG/DL (ref 2.7–4.5)
PLATELET # BLD AUTO: 47 K/UL (ref 150–450)
PMV BLD AUTO: ABNORMAL FL (ref 9.2–12.9)
POTASSIUM SERPL-SCNC: 3.3 MMOL/L (ref 3.5–5.1)
PROT SERPL-MCNC: 6.3 G/DL (ref 6–8.4)
PROTHROMBIN TIME: 19 SEC (ref 9–12.5)
RBC # BLD AUTO: 3.37 M/UL (ref 4.6–6.2)
SODIUM SERPL-SCNC: 132 MMOL/L (ref 136–145)
WBC # BLD AUTO: 7.45 K/UL (ref 3.9–12.7)

## 2022-09-14 PROCEDURE — 83735 ASSAY OF MAGNESIUM: CPT | Performed by: NURSE PRACTITIONER

## 2022-09-14 PROCEDURE — 63600175 PHARM REV CODE 636 W HCPCS: Performed by: STUDENT IN AN ORGANIZED HEALTH CARE EDUCATION/TRAINING PROGRAM

## 2022-09-14 PROCEDURE — 94761 N-INVAS EAR/PLS OXIMETRY MLT: CPT

## 2022-09-14 PROCEDURE — 93010 EKG 12-LEAD: ICD-10-PCS | Mod: ,,, | Performed by: INTERNAL MEDICINE

## 2022-09-14 PROCEDURE — 36415 COLL VENOUS BLD VENIPUNCTURE: CPT | Performed by: NURSE PRACTITIONER

## 2022-09-14 PROCEDURE — 85025 COMPLETE CBC W/AUTO DIFF WBC: CPT | Performed by: NURSE PRACTITIONER

## 2022-09-14 PROCEDURE — 99233 SBSQ HOSP IP/OBS HIGH 50: CPT | Mod: ,,, | Performed by: STUDENT IN AN ORGANIZED HEALTH CARE EDUCATION/TRAINING PROGRAM

## 2022-09-14 PROCEDURE — 25000003 PHARM REV CODE 250: Performed by: STUDENT IN AN ORGANIZED HEALTH CARE EDUCATION/TRAINING PROGRAM

## 2022-09-14 PROCEDURE — 99233 PR SUBSEQUENT HOSPITAL CARE,LEVL III: ICD-10-PCS | Mod: ,,, | Performed by: STUDENT IN AN ORGANIZED HEALTH CARE EDUCATION/TRAINING PROGRAM

## 2022-09-14 PROCEDURE — 93010 ELECTROCARDIOGRAM REPORT: CPT | Mod: ,,, | Performed by: INTERNAL MEDICINE

## 2022-09-14 PROCEDURE — 27000207 HC ISOLATION

## 2022-09-14 PROCEDURE — 63600175 PHARM REV CODE 636 W HCPCS: Performed by: HOSPITALIST

## 2022-09-14 PROCEDURE — 84100 ASSAY OF PHOSPHORUS: CPT | Performed by: NURSE PRACTITIONER

## 2022-09-14 PROCEDURE — 85610 PROTHROMBIN TIME: CPT | Performed by: NURSE PRACTITIONER

## 2022-09-14 PROCEDURE — 80053 COMPREHEN METABOLIC PANEL: CPT | Performed by: NURSE PRACTITIONER

## 2022-09-14 PROCEDURE — 93005 ELECTROCARDIOGRAM TRACING: CPT

## 2022-09-14 PROCEDURE — 25000003 PHARM REV CODE 250: Performed by: HOSPITALIST

## 2022-09-14 PROCEDURE — 20600001 HC STEP DOWN PRIVATE ROOM

## 2022-09-14 PROCEDURE — 25000003 PHARM REV CODE 250

## 2022-09-14 RX ORDER — OXYCODONE HYDROCHLORIDE 10 MG/1
10 TABLET ORAL EVERY 6 HOURS PRN
Status: DISCONTINUED | OUTPATIENT
Start: 2022-09-14 | End: 2022-09-18 | Stop reason: HOSPADM

## 2022-09-14 RX ORDER — OXYCODONE HYDROCHLORIDE 5 MG/1
5 TABLET ORAL EVERY 6 HOURS PRN
Status: DISCONTINUED | OUTPATIENT
Start: 2022-09-14 | End: 2022-09-18 | Stop reason: HOSPADM

## 2022-09-14 RX ORDER — SULFAMETHOXAZOLE AND TRIMETHOPRIM 800; 160 MG/1; MG/1
1 TABLET ORAL DAILY
Status: DISCONTINUED | OUTPATIENT
Start: 2022-09-15 | End: 2022-09-18 | Stop reason: HOSPADM

## 2022-09-14 RX ORDER — MORPHINE SULFATE 4 MG/ML
4 INJECTION, SOLUTION INTRAMUSCULAR; INTRAVENOUS ONCE
Status: COMPLETED | OUTPATIENT
Start: 2022-09-14 | End: 2022-09-14

## 2022-09-14 RX ORDER — BLEOMYCIN 30 [USP'U]/1
60 POWDER, FOR SOLUTION INTRAMUSCULAR; INTRAPLEURAL; INTRAVENOUS; SUBCUTANEOUS ONCE
Status: CANCELLED
Start: 2022-09-14 | End: 2022-09-14

## 2022-09-14 RX ORDER — POTASSIUM CHLORIDE 20 MEQ/1
40 TABLET, EXTENDED RELEASE ORAL ONCE
Status: COMPLETED | OUTPATIENT
Start: 2022-09-14 | End: 2022-09-14

## 2022-09-14 RX ORDER — ONDANSETRON 2 MG/ML
4 INJECTION INTRAMUSCULAR; INTRAVENOUS EVERY 8 HOURS PRN
Status: DISCONTINUED | OUTPATIENT
Start: 2022-09-14 | End: 2022-09-18 | Stop reason: HOSPADM

## 2022-09-14 RX ORDER — TRAMADOL HYDROCHLORIDE 50 MG/1
50 TABLET ORAL EVERY 6 HOURS PRN
Status: DISCONTINUED | OUTPATIENT
Start: 2022-09-14 | End: 2022-09-18 | Stop reason: HOSPADM

## 2022-09-14 RX ORDER — SCOLOPAMINE TRANSDERMAL SYSTEM 1 MG/1
1 PATCH, EXTENDED RELEASE TRANSDERMAL
Status: DISCONTINUED | OUTPATIENT
Start: 2022-09-14 | End: 2022-09-16

## 2022-09-14 RX ORDER — SODIUM,POTASSIUM PHOSPHATES 280-250MG
2 POWDER IN PACKET (EA) ORAL
Status: COMPLETED | OUTPATIENT
Start: 2022-09-14 | End: 2022-09-14

## 2022-09-14 RX ORDER — FUROSEMIDE 40 MG/1
40 TABLET ORAL DAILY
Status: DISCONTINUED | OUTPATIENT
Start: 2022-09-14 | End: 2022-09-15

## 2022-09-14 RX ADMIN — MORPHINE SULFATE 4 MG: 4 INJECTION INTRAVENOUS at 05:09

## 2022-09-14 RX ADMIN — Medication 100 MG: at 08:09

## 2022-09-14 RX ADMIN — PANTOPRAZOLE SODIUM 40 MG: 40 TABLET, DELAYED RELEASE ORAL at 08:09

## 2022-09-14 RX ADMIN — PIPERACILLIN SODIUM AND TAZOBACTAM SODIUM 4.5 G: 4; .5 INJECTION, POWDER, LYOPHILIZED, FOR SOLUTION INTRAVENOUS at 04:09

## 2022-09-14 RX ADMIN — ONDANSETRON 4 MG: 2 INJECTION INTRAMUSCULAR; INTRAVENOUS at 09:09

## 2022-09-14 RX ADMIN — POTASSIUM & SODIUM PHOSPHATES POWDER PACK 280-160-250 MG 2 PACKET: 280-160-250 PACK at 03:09

## 2022-09-14 RX ADMIN — POTASSIUM CHLORIDE 40 MEQ: 1500 TABLET, EXTENDED RELEASE ORAL at 08:09

## 2022-09-14 RX ADMIN — SPIRONOLACTONE 100 MG: 100 TABLET ORAL at 08:09

## 2022-09-14 RX ADMIN — RIFAXIMIN 550 MG: 550 TABLET ORAL at 08:09

## 2022-09-14 RX ADMIN — POTASSIUM & SODIUM PHOSPHATES POWDER PACK 280-160-250 MG 2 PACKET: 280-160-250 PACK at 11:09

## 2022-09-14 RX ADMIN — FOLIC ACID 1 MG: 1 TABLET ORAL at 08:09

## 2022-09-14 RX ADMIN — POTASSIUM & SODIUM PHOSPHATES POWDER PACK 280-160-250 MG 2 PACKET: 280-160-250 PACK at 09:09

## 2022-09-14 RX ADMIN — RIFAXIMIN 550 MG: 550 TABLET ORAL at 09:09

## 2022-09-14 RX ADMIN — FUROSEMIDE 40 MG: 20 TABLET ORAL at 08:09

## 2022-09-14 RX ADMIN — LACTULOSE 20 G: 20 SOLUTION ORAL at 03:09

## 2022-09-14 RX ADMIN — LACTULOSE 20 G: 20 SOLUTION ORAL at 09:09

## 2022-09-14 RX ADMIN — LACTULOSE 20 G: 20 SOLUTION ORAL at 08:09

## 2022-09-14 NOTE — ASSESSMENT & PLAN NOTE
RESOLVED, now on ROOM AIR  Patient presented with Hypoxic Respiratory failure which is Acute.  he is not on home oxygen. Supplemental oxygen was provided and noted-  .   Signs/symptoms of respiratory failure include- tachypnea and increased work of breathing. 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 Labs and images were reviewed. Patient Has not had a recent ABG.  Initial chest CT with bilateral patchy infiltrates consistent with Covid-19. Right plural effusion (suspect hepatohydrothorax) + pneumothorax (unclear if procedural complication) s/p chest tube placement at OSH. CT imaging concerning for chest tube in lung parenchyma vs lung re-expansion around tube. CTS evaluation with recs for stabilization prior to any intervention    - Finished remdes and dexamethasone  - Discontinued abx  - Output was improving from chest tube, but later worsened. Patient now needing pleurodesis to help reduce output and hopefully remove chest tube in 48 hrs.

## 2022-09-14 NOTE — PT/OT/SLP PROGRESS
Occupational Therapy      Patient Name:  Porfirio Wilson   MRN:  [id number]    Patient not seen today secondary to attempted therapy at 205pm w MD hold 2/2 patient's chest tube clamped, Dr. Bolden with cardiothoracic surgery requested to defer OT on this date. Patient reports improvement in functional mob. Will follow-up per POC.    9/14/2022

## 2022-09-14 NOTE — PROGRESS NOTES
Hepatology Treatment Plan    Porfirio Wilson is a 37 y.o. male admitted to hospital 9/3/2022 (Hospital Day: 12) due to Acute hypoxemic respiratory failure.     Interval History  Plan for pleurodesis with CTS.     Objective  Temp:  [97.7 °F (36.5 °C)-98.3 °F (36.8 °C)] 97.8 °F (36.6 °C) (09/14 0800)  Pulse:  [60-76] 69 (09/14 0800)  BP: ()/(55-59) 111/56 (09/14 0800)  Resp:  [14-41] 18 (09/14 0800)  SpO2:  [94 %-100 %] 94 % (09/14 0800)      Laboratory    Lab Results   Component Value Date    WBC 7.45 09/14/2022    HGB 9.8 (L) 09/14/2022    HCT 30.2 (L) 09/14/2022    MCV 90 09/14/2022    PLT 47 (L) 09/14/2022       Lab Results   Component Value Date     (L) 09/14/2022    K 3.3 (L) 09/14/2022     09/14/2022    CO2 23 09/14/2022    BUN 34 (H) 09/14/2022    CREATININE 1.1 09/14/2022    CALCIUM 8.7 09/14/2022       Lab Results   Component Value Date    ALBUMIN 4.0 09/14/2022     (H) 09/14/2022     (H) 09/14/2022    ALKPHOS 89 09/14/2022    BILITOT 6.2 (H) 09/14/2022       Lab Results   Component Value Date    INR 1.9 (H) 09/14/2022    INR 2.1 (H) 09/13/2022    INR 2.0 (H) 09/12/2022       MELD-Na score: 24 at 9/14/2022  4:34 AM  MELD score: 21 at 9/14/2022  4:34 AM  Calculated from:  Serum Creatinine: 1.1 mg/dL at 9/14/2022  4:34 AM  Serum Sodium: 132 mmol/L at 9/14/2022  4:34 AM  Total Bilirubin: 6.2 mg/dL at 9/14/2022  4:34 AM  INR(ratio): 1.9 at 9/14/2022  4:34 AM  Age: 37 years    Problem List:  Decompensated alcoholic cirrhosis  Pneumo hydrothorax     Assessment:  The patient presents with hypoxia secondary to hydropneumothorax, cause unclear, but could be hepatic hydrothorax in which case long-term chest tube is not indicated.  The patient's meld is elevated, a 26 today mainly driven by INR and bilirubin although sodium is also contributing.  Would recommend vitamin K.  He is not a liver transplant candidate at this time due to recent alcohol use despite knowledge of liver disease as  well as failed rehab attempt in the past.  Would recommend involvement in IOP prior to transplant evaluation.     Plan:  - vitamin K x3  - chest tube removal per primary team  - abstinence from alcohol, explained to the patient the importance of maintaining sobriety and enrollment in rehab, also advised about possibility of virtual sessions  - continue diuretics  - will need outpatient EGD for variceal surveillance, unlikely to be a candidate for beta blockers at this time due to his low blood pressure  - we will arrange for outpatient follow up in 2 weeks    Thank you for involving us in the care of Porfirio Wilson. We will sign off. Please call with any additional concerns or questions.    Elizabeth Carson MD  Gastroenterology Fellow

## 2022-09-14 NOTE — PLAN OF CARE
Thoracic Surgery    Patient seen and examined. Continues to have high serous output from R chest tube. No air leak.   Clinically much improved from respiratory standpoint.    Will plan for bedside chemical pleurodesis with Bleomycin.   Repeat CXR today    Omero Bolden MD  Cardiothoracic Surgery PGY6

## 2022-09-14 NOTE — SUBJECTIVE & OBJECTIVE
Interval History: No acute issues overnight. Plan for pleurodesis today. Patient feeling subjectively better but endorses excessive leakage from chest tube site.     Review of Systems   Constitutional:  Negative for chills and fever.   HENT:  Negative for congestion and sore throat.    Eyes:  Negative for photophobia and visual disturbance.   Respiratory:  Negative for cough and shortness of breath.    Cardiovascular:  Negative for chest pain (around chest tube site) and palpitations.   Gastrointestinal:  Negative for abdominal pain, blood in stool, constipation, diarrhea, nausea and vomiting.   Endocrine: Negative for cold intolerance and heat intolerance.   Genitourinary:  Negative for dysuria and hematuria.   Musculoskeletal:  Negative for arthralgias and myalgias.   Skin:  Negative for rash and wound.   Allergic/Immunologic: Negative for environmental allergies and food allergies.   Neurological:  Negative for seizures and numbness.   Hematological:  Negative for adenopathy. Does not bruise/bleed easily.   Psychiatric/Behavioral:  Negative for hallucinations and suicidal ideas.    Objective:     Vital Signs (Most Recent):  Temp: 97.8 °F (36.6 °C) (09/14/22 0800)  Pulse: 69 (09/14/22 0800)  Resp: 18 (09/14/22 0800)  BP: (!) 111/56 (09/14/22 0800)  SpO2: (!) 94 % (09/14/22 0800)   Vital Signs (24h Range):  Temp:  [97.7 °F (36.5 °C)-98.3 °F (36.8 °C)] 97.8 °F (36.6 °C)  Pulse:  [60-76] 69  Resp:  [14-41] 18  SpO2:  [94 %-100 %] 94 %  BP: ()/(55-59) 111/56     Weight: 73.2 kg (161 lb 6 oz)  Body mass index is 21.89 kg/m².    Intake/Output Summary (Last 24 hours) at 9/14/2022 1357  Last data filed at 9/14/2022 0600  Gross per 24 hour   Intake 480 ml   Output 440 ml   Net 40 ml      Physical Exam  Constitutional:       Appearance: He is well-developed.   HENT:      Head: Normocephalic and atraumatic.   Eyes:      Conjunctiva/sclera: Conjunctivae normal.      Pupils: Pupils are equal, round, and reactive to  light.   Neck:      Thyroid: No thyromegaly.      Vascular: No JVD.   Cardiovascular:      Rate and Rhythm: Normal rate and regular rhythm.      Heart sounds: Normal heart sounds. No murmur heard.    No friction rub. No gallop.   Pulmonary:      Effort: Pulmonary effort is normal. No respiratory distress.      Breath sounds: Normal breath sounds. No wheezing or rales.      Comments: Serous leakage around chest tube site.  Abdominal:      General: Bowel sounds are normal. There is no distension.      Palpations: Abdomen is soft. There is no mass.      Tenderness: There is no abdominal tenderness. There is no guarding or rebound.      Hernia: No hernia is present.   Musculoskeletal:         General: No deformity. Normal range of motion.      Cervical back: Normal range of motion and neck supple.   Skin:     General: Skin is warm and dry.   Neurological:      Mental Status: He is alert and oriented to person, place, and time.      Cranial Nerves: No cranial nerve deficit.   Psychiatric:         Behavior: Behavior normal.       Significant Labs: All pertinent labs within the past 24 hours have been reviewed.  CBC:   Recent Labs   Lab 09/13/22 0504 09/14/22 0434   WBC 7.19 7.45   HGB 9.5* 9.8*   HCT 29.3* 30.2*   PLT 37* 47*     CMP:   Recent Labs   Lab 09/13/22 0504 09/14/22 0434   * 132*   K 3.6 3.3*    101   CO2 22* 23   GLU 82 125*   BUN 33* 34*   CREATININE 1.1 1.1   CALCIUM 9.0 8.7   PROT 6.6 6.3   ALBUMIN 4.1 4.0   BILITOT 6.0* 6.2*   ALKPHOS 90 89   * 126*   * 126*   ANIONGAP 8 8     Coagulation:   Recent Labs   Lab 09/14/22 0434   INR 1.9*       Significant Imaging: I have reviewed all pertinent imaging results/findings within the past 24 hours.

## 2022-09-14 NOTE — NURSING
End of shift note    AAOx4  RA  Chemical pleurodesis- chest tube to suction  Morphine x1  VSS  NADN- safety checks performed  Call light in reach

## 2022-09-14 NOTE — TREATMENT PLAN
Thoracic Surgery     60mL of Bleomycin instilled into chest tube at 1315 and clamped. Please leave chest tube clamped for 2 hours. Unclamp at 1515 and place to continuous suction at -20mmHg.   Chest tube to remain on suction for 48hrs hours.    CXR daily  Please call with any questions or concerns.     Omero Bolden MD  Cardiothoracic Surgery PGY6

## 2022-09-14 NOTE — PT/OT/SLP PROGRESS
Physical Therapy      Patient Name:  Porfirio Wilson   MRN:  [id number]    Attempted therapy at 14:05. Patient not seen today secondary to MD hold (Comment)- patient's chest tube clamped, Dr. Bolden with cardiothoracic surgery requested to defer PT on this date. Patient reports improvement in his gait and activity tolerance- he has been ambulating around his room.  Will follow-up as appropriate.

## 2022-09-14 NOTE — ASSESSMENT & PLAN NOTE
Reported to have full tonic-clonic seizure like activity lasting ~ 1 min and resolved spontaneously at onset of transfer from OSH per EMS. None witnessed this hospitalization  - Head CT negative for acute intracranial abnormality

## 2022-09-14 NOTE — HOSPITAL COURSE
Since transfer, chest tube drainage has been monitored. It has actually gone up 9/13-9/14 and currently plans are for bedside pleurodesis with CTS. Bedside pleurodesis performed 9/14. Hypotension/pain overnight following pleurodesis. Patient following morning reported feeling much better. MAN improved with fluids on 9/15. Patient with continued chest tube output, surgery placed CT to water seal on 9/17. Chest tube removed 9/18.

## 2022-09-14 NOTE — ASSESSMENT & PLAN NOTE
MELD-Na score: 24 at 9/14/2022  4:34 AM  MELD score: 21 at 9/14/2022  4:34 AM  Calculated from:  Serum Creatinine: 1.1 mg/dL at 9/14/2022  4:34 AM  Serum Sodium: 132 mmol/L at 9/14/2022  4:34 AM  Total Bilirubin: 6.2 mg/dL at 9/14/2022  4:34 AM  INR(ratio): 1.9 at 9/14/2022  4:34 AM  Age: 37 years  Secondary to alcoholic cirrhosis. Not a transplant candidate given etoh use last admission but patient's PETH is <10 indicating continued abstinence which bodes well for patient. He needs to continue AA meetings on discharge. -holding home propranolol in setting of hypotension - can resume as clinically appropriate  - Continue 40mg lasix and 100mg spironolactone for volume  - Continuing bactrim for ppx while patient still has chest tube in place.  - Hepatology consulted

## 2022-09-14 NOTE — PROGRESS NOTES
[facility] - Telemetry [facility] Medicine  Progress Note    Patient Name: Porfirio Wilson  MRN: [id number]  Patient Class: IP- Inpatient   Admission Date: 9/3/2022  Length of Stay: 11 days  Attending Physician: Taryn Linares MD  Primary Care Provider: No primary care provider on file.        Subjective:     Principal Problem:Acute hypoxemic respiratory failure        HPI:  Patient is a 36 y.o. male with significant past medical history of ETOH cirrhosis (complicated by presence of esophageal varices s/p band ligation), bipolar d/o and pancreatitis presented to [facility] on 9/3 complaining of shortness of breath x2 hours with associated fever/chills, cough and diaphoresis. Patient was intubated in the ED due to degree of respiratory distress. Cxry showed right pleural effusion and pneumothorax and focal consolidation on the left. He was found to be Covid positive. Patient had a right side chest tube placed. Labs at OSH significant for leukocytosis of 16, procal 0.46, lactic 5, , INR 2.1, negative troponin, ETOH negative and ammonia 86.  Pt was tx w/ levaquin & flagyl, as well as given IVF & vit K for coagulopathy associated w/ liver dz. Pt is currently requiring levo for map > 65. Last reported ETOH drink ~ 6 weeks ago.      Patient has been transferred to [facility] for higher level of care and Hepatology services.       Overview/Hospital Course:  Since transfer, chest tube drainage has been monitored. It has actually gone up 9/13-9/14 and currently plans are for bedside pleurodesis with CTS.      Interval History: No acute issues overnight. Plan for pleurodesis today. Patient feeling subjectively better but endorses excessive leakage from chest tube site.     Review of Systems   Constitutional:  Negative for chills and fever.   HENT:  Negative for congestion and sore throat.    Eyes:  Negative for photophobia and visual disturbance.   Respiratory:  Negative for cough and  shortness of breath.    Cardiovascular:  Negative for chest pain (around chest tube site) and palpitations.   Gastrointestinal:  Negative for abdominal pain, blood in stool, constipation, diarrhea, nausea and vomiting.   Endocrine: Negative for cold intolerance and heat intolerance.   Genitourinary:  Negative for dysuria and hematuria.   Musculoskeletal:  Negative for arthralgias and myalgias.   Skin:  Negative for rash and wound.   Allergic/Immunologic: Negative for environmental allergies and food allergies.   Neurological:  Negative for seizures and numbness.   Hematological:  Negative for adenopathy. Does not bruise/bleed easily.   Psychiatric/Behavioral:  Negative for hallucinations and suicidal ideas.    Objective:     Vital Signs (Most Recent):  Temp: 97.8 °F (36.6 °C) (09/14/22 0800)  Pulse: 69 (09/14/22 0800)  Resp: 18 (09/14/22 0800)  BP: (!) 111/56 (09/14/22 0800)  SpO2: (!) 94 % (09/14/22 0800)   Vital Signs (24h Range):  Temp:  [97.7 °F (36.5 °C)-98.3 °F (36.8 °C)] 97.8 °F (36.6 °C)  Pulse:  [60-76] 69  Resp:  [14-41] 18  SpO2:  [94 %-100 %] 94 %  BP: ()/(55-59) 111/56     Weight: 73.2 kg (161 lb 6 oz)  Body mass index is 21.89 kg/m².    Intake/Output Summary (Last 24 hours) at 9/14/2022 1357  Last data filed at 9/14/2022 0600  Gross per 24 hour   Intake 480 ml   Output 440 ml   Net 40 ml      Physical Exam  Constitutional:       Appearance: He is well-developed.   HENT:      Head: Normocephalic and atraumatic.   Eyes:      Conjunctiva/sclera: Conjunctivae normal.      Pupils: Pupils are equal, round, and reactive to light.   Neck:      Thyroid: No thyromegaly.      Vascular: No JVD.   Cardiovascular:      Rate and Rhythm: Normal rate and regular rhythm.      Heart sounds: Normal heart sounds. No murmur heard.    No friction rub. No gallop.   Pulmonary:      Effort: Pulmonary effort is normal. No respiratory distress.      Breath sounds: Normal breath sounds. No wheezing or rales.      Comments:  Serous leakage around chest tube site.  Abdominal:      General: Bowel sounds are normal. There is no distension.      Palpations: Abdomen is soft. There is no mass.      Tenderness: There is no abdominal tenderness. There is no guarding or rebound.      Hernia: No hernia is present.   Musculoskeletal:         General: No deformity. Normal range of motion.      Cervical back: Normal range of motion and neck supple.   Skin:     General: Skin is warm and dry.   Neurological:      Mental Status: He is alert and oriented to person, place, and time.      Cranial Nerves: No cranial nerve deficit.   Psychiatric:         Behavior: Behavior normal.       Significant Labs: All pertinent labs within the past 24 hours have been reviewed.  CBC:   Recent Labs   Lab 09/13/22  0504 09/14/22 0434   WBC 7.19 7.45   HGB 9.5* 9.8*   HCT 29.3* 30.2*   PLT 37* 47*     CMP:   Recent Labs   Lab 09/13/22  0504 09/14/22 0434   * 132*   K 3.6 3.3*    101   CO2 22* 23   GLU 82 125*   BUN 33* 34*   CREATININE 1.1 1.1   CALCIUM 9.0 8.7   PROT 6.6 6.3   ALBUMIN 4.1 4.0   BILITOT 6.0* 6.2*   ALKPHOS 90 89   * 126*   * 126*   ANIONGAP 8 8     Coagulation:   Recent Labs   Lab 09/14/22 0434   INR 1.9*       Significant Imaging: I have reviewed all pertinent imaging results/findings within the past 24 hours.      Assessment/Plan:      * Acute hypoxemic respiratory failure  RESOLVED, now on ROOM AIR  Patient presented with Hypoxic Respiratory failure which is Acute.  he is not on home oxygen. Supplemental oxygen was provided and noted-  .   Signs/symptoms of respiratory failure include- tachypnea and increased work of breathing. 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 Labs and images were reviewed. Patient Has not had a recent ABG.  Initial chest CT with bilateral patchy infiltrates consistent with Covid-19. Right plural effusion (suspect hepatohydrothorax) + pneumothorax (unclear if procedural  complication) s/p chest tube placement at OSH. CT imaging concerning for chest tube in lung parenchyma vs lung re-expansion around tube. CTS evaluation with recs for stabilization prior to any intervention    - Finished remdes and dexamethasone  - Discontinued abx  - Output was improving from chest tube, but later worsened. Patient now needing pleurodesis to help reduce output and hopefully remove chest tube in 48 hrs.    Seizure  Reported to have full tonic-clonic seizure like activity lasting ~ 1 min and resolved spontaneously at onset of transfer from OSH per EMS. None witnessed this hospitalization  - Head CT negative for acute intracranial abnormality       Decompensated hepatic cirrhosis  MELD-Na score: 24 at 9/14/2022  4:34 AM  MELD score: 21 at 9/14/2022  4:34 AM  Calculated from:  Serum Creatinine: 1.1 mg/dL at 9/14/2022  4:34 AM  Serum Sodium: 132 mmol/L at 9/14/2022  4:34 AM  Total Bilirubin: 6.2 mg/dL at 9/14/2022  4:34 AM  INR(ratio): 1.9 at 9/14/2022  4:34 AM  Age: 37 years  Secondary to alcoholic cirrhosis. Not a transplant candidate given etoh use last admission but patient's PETH is <10 indicating continued abstinence which bodes well for patient. He needs to continue AA meetings on discharge. -holding home propranolol in setting of hypotension - can resume as clinically appropriate  - Continue 40mg lasix and 100mg spironolactone for volume  - Continuing bactrim for ppx while patient still has chest tube in place.  - Hepatology consulted      VTE Risk Mitigation (From admission, onward)         Ordered     IP VTE LOW RISK PATIENT  Once         09/03/22 2258     Place sequential compression device  Until discontinued         09/03/22 2258                Discharge Planning   ENIO: 9/16/2022     Code Status: Full Code   Is the patient medically ready for discharge?: No    Reason for patient still in hospital (select all that apply): Patient trending condition  Discharge Plan A: Home with family    Discharge Delays: None known at this time              Taryn Linares MD  Department of Hospital Medicine   [facility] - Telemetry Stepdown

## 2022-09-14 NOTE — PLAN OF CARE
Problem: Adult Inpatient Plan of Care  Goal: Plan of Care Review  Outcome: Ongoing, Progressing  Goal: Patient-Specific Goal (Individualized)  Outcome: Ongoing, Progressing  Goal: Absence of Hospital-Acquired Illness or Injury  Outcome: Ongoing, Progressing  Goal: Optimal Comfort and Wellbeing  Outcome: Ongoing, Progressing     Problem: Fall Injury Risk  Goal: Absence of Fall and Fall-Related Injury  Outcome: Ongoing, Progressing     Problem: Infection  Goal: Absence of Infection Signs and Symptoms  Outcome: Ongoing, Progressing     Problem: Skin Injury Risk Increased  Goal: Skin Health and Integrity  Outcome: Ongoing, Progressing

## 2022-09-14 NOTE — HPI
Patient is a 36 y.o. male with significant past medical history of ETOH cirrhosis (complicated by presence of esophageal varices s/p band ligation), bipolar d/o and pancreatitis presented to [facility] on 9/3 complaining of shortness of breath x2 hours with associated fever/chills, cough and diaphoresis. Patient was intubated in the ED due to degree of respiratory distress. Cxry showed right pleural effusion and pneumothorax and focal consolidation on the left. He was found to be Covid positive. Patient had a right side chest tube placed. Labs at OSH significant for leukocytosis of 16, procal 0.46, lactic 5, , INR 2.1, negative troponin, ETOH negative and ammonia 86.  Pt was tx w/ levaquin & flagyl, as well as given IVF & vit K for coagulopathy associated w/ liver dz. Pt is currently requiring levo for map > 65. Last reported ETOH drink ~ 6 weeks ago.      Patient has been transferred to [facility] for higher level of care and Hepatology services.

## 2022-09-15 PROBLEM — N17.9 AKI (ACUTE KIDNEY INJURY): Status: ACTIVE | Noted: 2022-09-15

## 2022-09-15 LAB
ALBUMIN SERPL BCP-MCNC: 3.5 G/DL (ref 3.5–5.2)
ALBUMIN SERPL BCP-MCNC: 3.7 G/DL (ref 3.5–5.2)
ALP SERPL-CCNC: 85 U/L (ref 55–135)
ALT SERPL W/O P-5'-P-CCNC: 126 U/L (ref 10–44)
ANION GAP SERPL CALC-SCNC: 10 MMOL/L (ref 8–16)
ANION GAP SERPL CALC-SCNC: 7 MMOL/L (ref 8–16)
AST SERPL-CCNC: 108 U/L (ref 10–40)
BACTERIA #/AREA URNS AUTO: ABNORMAL /HPF
BASOPHILS # BLD AUTO: 0.02 K/UL (ref 0–0.2)
BASOPHILS NFR BLD: 0.1 % (ref 0–1.9)
BILIRUB SERPL-MCNC: 6.2 MG/DL (ref 0.1–1)
BILIRUB UR QL STRIP: ABNORMAL
BUN SERPL-MCNC: 40 MG/DL (ref 6–20)
BUN SERPL-MCNC: 46 MG/DL (ref 6–20)
CALCIUM SERPL-MCNC: 8.6 MG/DL (ref 8.7–10.5)
CALCIUM SERPL-MCNC: 8.7 MG/DL (ref 8.7–10.5)
CHLORIDE SERPL-SCNC: 96 MMOL/L (ref 95–110)
CHLORIDE SERPL-SCNC: 97 MMOL/L (ref 95–110)
CLARITY UR REFRACT.AUTO: ABNORMAL
CO2 SERPL-SCNC: 21 MMOL/L (ref 23–29)
CO2 SERPL-SCNC: 25 MMOL/L (ref 23–29)
COLOR UR AUTO: YELLOW
CREAT SERPL-MCNC: 2 MG/DL (ref 0.5–1.4)
CREAT SERPL-MCNC: 2 MG/DL (ref 0.5–1.4)
CREAT UR-MCNC: 328 MG/DL (ref 23–375)
DIFFERENTIAL METHOD: ABNORMAL
EOSINOPHIL # BLD AUTO: 0 K/UL (ref 0–0.5)
EOSINOPHIL NFR BLD: 0.1 % (ref 0–8)
ERYTHROCYTE [DISTWIDTH] IN BLOOD BY AUTOMATED COUNT: 17.9 % (ref 11.5–14.5)
EST. GFR  (NO RACE VARIABLE): 43.5 ML/MIN/1.73 M^2
EST. GFR  (NO RACE VARIABLE): 43.5 ML/MIN/1.73 M^2
GLUCOSE SERPL-MCNC: 112 MG/DL (ref 70–110)
GLUCOSE SERPL-MCNC: 150 MG/DL (ref 70–110)
GLUCOSE UR QL STRIP: NEGATIVE
GRAN CASTS UR QL COMP ASSIST: 87 /LPF
HCT VFR BLD AUTO: 27.1 % (ref 40–54)
HGB BLD-MCNC: 9 G/DL (ref 14–18)
HGB UR QL STRIP: ABNORMAL
HYALINE CASTS UR QL AUTO: 25 /LPF
IMM GRANULOCYTES # BLD AUTO: 0.14 K/UL (ref 0–0.04)
IMM GRANULOCYTES NFR BLD AUTO: 0.7 % (ref 0–0.5)
INR PPP: 2 (ref 0.8–1.2)
KETONES UR QL STRIP: NEGATIVE
LEUKOCYTE ESTERASE UR QL STRIP: ABNORMAL
LYMPHOCYTES # BLD AUTO: 0.3 K/UL (ref 1–4.8)
LYMPHOCYTES NFR BLD: 1.4 % (ref 18–48)
MAGNESIUM SERPL-MCNC: 1.6 MG/DL (ref 1.6–2.6)
MCH RBC QN AUTO: 29.7 PG (ref 27–31)
MCHC RBC AUTO-ENTMCNC: 33.2 G/DL (ref 32–36)
MCV RBC AUTO: 89 FL (ref 82–98)
MICROSCOPIC COMMENT: ABNORMAL
MONOCYTES # BLD AUTO: 0.9 K/UL (ref 0.3–1)
MONOCYTES NFR BLD: 4.3 % (ref 4–15)
NEUTROPHILS # BLD AUTO: 20 K/UL (ref 1.8–7.7)
NEUTROPHILS NFR BLD: 93.4 % (ref 38–73)
NITRITE UR QL STRIP: NEGATIVE
NRBC BLD-RTO: 0 /100 WBC
OSMOLALITY UR: 425 MOSM/KG (ref 50–1200)
PH UR STRIP: 6 [PH] (ref 5–8)
PHOSPHATE SERPL-MCNC: 3.1 MG/DL (ref 2.7–4.5)
PHOSPHATE SERPL-MCNC: 3.2 MG/DL (ref 2.7–4.5)
PLATELET # BLD AUTO: 46 K/UL (ref 150–450)
PMV BLD AUTO: ABNORMAL FL (ref 9.2–12.9)
POTASSIUM SERPL-SCNC: 4.2 MMOL/L (ref 3.5–5.1)
POTASSIUM SERPL-SCNC: 4.4 MMOL/L (ref 3.5–5.1)
PROT SERPL-MCNC: 5.7 G/DL (ref 6–8.4)
PROT UR QL STRIP: ABNORMAL
PROTHROMBIN TIME: 20.1 SEC (ref 9–12.5)
RBC # BLD AUTO: 3.03 M/UL (ref 4.6–6.2)
RBC #/AREA URNS AUTO: 38 /HPF (ref 0–4)
SODIUM SERPL-SCNC: 127 MMOL/L (ref 136–145)
SODIUM SERPL-SCNC: 129 MMOL/L (ref 136–145)
SODIUM UR-SCNC: 10 MMOL/L (ref 20–250)
SP GR UR STRIP: 1.02 (ref 1–1.03)
SQUAMOUS #/AREA URNS AUTO: 0 /HPF
URN SPEC COLLECT METH UR: ABNORMAL
UUN UR-MCNC: 474 MG/DL (ref 140–1050)
WBC # BLD AUTO: 21.4 K/UL (ref 3.9–12.7)
WBC #/AREA URNS AUTO: 31 /HPF (ref 0–5)

## 2022-09-15 PROCEDURE — P9047 ALBUMIN (HUMAN), 25%, 50ML: HCPCS | Mod: JG | Performed by: STUDENT IN AN ORGANIZED HEALTH CARE EDUCATION/TRAINING PROGRAM

## 2022-09-15 PROCEDURE — 99233 SBSQ HOSP IP/OBS HIGH 50: CPT | Mod: ,,, | Performed by: STUDENT IN AN ORGANIZED HEALTH CARE EDUCATION/TRAINING PROGRAM

## 2022-09-15 PROCEDURE — 87086 URINE CULTURE/COLONY COUNT: CPT | Performed by: STUDENT IN AN ORGANIZED HEALTH CARE EDUCATION/TRAINING PROGRAM

## 2022-09-15 PROCEDURE — 80053 COMPREHEN METABOLIC PANEL: CPT | Performed by: NURSE PRACTITIONER

## 2022-09-15 PROCEDURE — 27000207 HC ISOLATION

## 2022-09-15 PROCEDURE — 97116 GAIT TRAINING THERAPY: CPT

## 2022-09-15 PROCEDURE — 83735 ASSAY OF MAGNESIUM: CPT | Performed by: NURSE PRACTITIONER

## 2022-09-15 PROCEDURE — 85610 PROTHROMBIN TIME: CPT | Performed by: NURSE PRACTITIONER

## 2022-09-15 PROCEDURE — 97530 THERAPEUTIC ACTIVITIES: CPT

## 2022-09-15 PROCEDURE — 25000003 PHARM REV CODE 250: Performed by: STUDENT IN AN ORGANIZED HEALTH CARE EDUCATION/TRAINING PROGRAM

## 2022-09-15 PROCEDURE — 87040 BLOOD CULTURE FOR BACTERIA: CPT | Performed by: STUDENT IN AN ORGANIZED HEALTH CARE EDUCATION/TRAINING PROGRAM

## 2022-09-15 PROCEDURE — 84100 ASSAY OF PHOSPHORUS: CPT | Performed by: NURSE PRACTITIONER

## 2022-09-15 PROCEDURE — 80069 RENAL FUNCTION PANEL: CPT | Performed by: STUDENT IN AN ORGANIZED HEALTH CARE EDUCATION/TRAINING PROGRAM

## 2022-09-15 PROCEDURE — 81001 URINALYSIS AUTO W/SCOPE: CPT | Performed by: STUDENT IN AN ORGANIZED HEALTH CARE EDUCATION/TRAINING PROGRAM

## 2022-09-15 PROCEDURE — 36415 COLL VENOUS BLD VENIPUNCTURE: CPT | Performed by: STUDENT IN AN ORGANIZED HEALTH CARE EDUCATION/TRAINING PROGRAM

## 2022-09-15 PROCEDURE — 97535 SELF CARE MNGMENT TRAINING: CPT

## 2022-09-15 PROCEDURE — 20600001 HC STEP DOWN PRIVATE ROOM

## 2022-09-15 PROCEDURE — 99233 PR SUBSEQUENT HOSPITAL CARE,LEVL III: ICD-10-PCS | Mod: ,,, | Performed by: STUDENT IN AN ORGANIZED HEALTH CARE EDUCATION/TRAINING PROGRAM

## 2022-09-15 PROCEDURE — 83935 ASSAY OF URINE OSMOLALITY: CPT | Performed by: STUDENT IN AN ORGANIZED HEALTH CARE EDUCATION/TRAINING PROGRAM

## 2022-09-15 PROCEDURE — 85025 COMPLETE CBC W/AUTO DIFF WBC: CPT | Performed by: NURSE PRACTITIONER

## 2022-09-15 PROCEDURE — 84540 ASSAY OF URINE/UREA-N: CPT | Performed by: STUDENT IN AN ORGANIZED HEALTH CARE EDUCATION/TRAINING PROGRAM

## 2022-09-15 PROCEDURE — 36415 COLL VENOUS BLD VENIPUNCTURE: CPT | Performed by: NURSE PRACTITIONER

## 2022-09-15 PROCEDURE — 25000003 PHARM REV CODE 250: Performed by: HOSPITALIST

## 2022-09-15 PROCEDURE — 82570 ASSAY OF URINE CREATININE: CPT | Performed by: STUDENT IN AN ORGANIZED HEALTH CARE EDUCATION/TRAINING PROGRAM

## 2022-09-15 PROCEDURE — 84300 ASSAY OF URINE SODIUM: CPT | Performed by: STUDENT IN AN ORGANIZED HEALTH CARE EDUCATION/TRAINING PROGRAM

## 2022-09-15 PROCEDURE — 63600175 PHARM REV CODE 636 W HCPCS: Mod: JG | Performed by: STUDENT IN AN ORGANIZED HEALTH CARE EDUCATION/TRAINING PROGRAM

## 2022-09-15 PROCEDURE — 25000003 PHARM REV CODE 250

## 2022-09-15 RX ORDER — MIDODRINE HYDROCHLORIDE 5 MG/1
10 TABLET ORAL EVERY 8 HOURS
Status: DISCONTINUED | OUTPATIENT
Start: 2022-09-15 | End: 2022-09-17

## 2022-09-15 RX ORDER — ALBUMIN HUMAN 250 G/1000ML
25 SOLUTION INTRAVENOUS ONCE
Status: COMPLETED | OUTPATIENT
Start: 2022-09-15 | End: 2022-09-15

## 2022-09-15 RX ADMIN — MIDODRINE HYDROCHLORIDE 10 MG: 5 TABLET ORAL at 08:09

## 2022-09-15 RX ADMIN — Medication 100 MG: at 08:09

## 2022-09-15 RX ADMIN — MIDODRINE HYDROCHLORIDE 10 MG: 5 TABLET ORAL at 03:09

## 2022-09-15 RX ADMIN — SODIUM CHLORIDE 250 ML: 0.9 INJECTION, SOLUTION INTRAVENOUS at 05:09

## 2022-09-15 RX ADMIN — SODIUM CHLORIDE 250 ML: 0.9 INJECTION, SOLUTION INTRAVENOUS at 12:09

## 2022-09-15 RX ADMIN — SCOPALAMINE 1 PATCH: 1 PATCH, EXTENDED RELEASE TRANSDERMAL at 12:09

## 2022-09-15 RX ADMIN — SULFAMETHOXAZOLE AND TRIMETHOPRIM 1 TABLET: 800; 160 TABLET ORAL at 08:09

## 2022-09-15 RX ADMIN — PANTOPRAZOLE SODIUM 40 MG: 40 TABLET, DELAYED RELEASE ORAL at 08:09

## 2022-09-15 RX ADMIN — LACTULOSE 20 G: 20 SOLUTION ORAL at 08:09

## 2022-09-15 RX ADMIN — ALBUMIN (HUMAN) 25 G: 0.25 INJECTION, SOLUTION INTRAVENOUS at 08:09

## 2022-09-15 RX ADMIN — OXYCODONE 5 MG: 5 TABLET ORAL at 08:09

## 2022-09-15 RX ADMIN — TRAMADOL HYDROCHLORIDE 50 MG: 50 TABLET, COATED ORAL at 06:09

## 2022-09-15 RX ADMIN — RIFAXIMIN 550 MG: 550 TABLET ORAL at 08:09

## 2022-09-15 RX ADMIN — LACTULOSE 20 G: 20 SOLUTION ORAL at 03:09

## 2022-09-15 RX ADMIN — FOLIC ACID 1 MG: 1 TABLET ORAL at 08:09

## 2022-09-15 RX ADMIN — MIDODRINE HYDROCHLORIDE 10 MG: 5 TABLET ORAL at 10:09

## 2022-09-15 NOTE — SUBJECTIVE & OBJECTIVE
Interval History: No acute issues overnight. Plan for pleurodesis today. Patient feeling subjectively better but endorses excessive leakage from chest tube site.     Review of Systems   Constitutional:  Negative for chills and fever.   HENT:  Negative for congestion and sore throat.    Eyes:  Negative for photophobia and visual disturbance.   Respiratory:  Negative for cough and shortness of breath.    Cardiovascular:  Negative for chest pain (around chest tube site) and palpitations.   Gastrointestinal:  Negative for abdominal pain, blood in stool, constipation, diarrhea, nausea and vomiting.   Endocrine: Negative for cold intolerance and heat intolerance.   Genitourinary:  Negative for dysuria and hematuria.   Musculoskeletal:  Negative for arthralgias and myalgias.   Skin:  Negative for rash and wound.   Allergic/Immunologic: Negative for environmental allergies and food allergies.   Neurological:  Negative for seizures and numbness.   Hematological:  Negative for adenopathy. Does not bruise/bleed easily.   Psychiatric/Behavioral:  Negative for hallucinations and suicidal ideas.    Objective:     Vital Signs (Most Recent):  Temp: 97.6 °F (36.4 °C) (09/15/22 0804)  Pulse: 82 (09/15/22 1114)  Resp: 18 (09/15/22 0804)  BP: (!) 82/47 (09/15/22 0854)  SpO2: 97 % (09/15/22 0804)   Vital Signs (24h Range):  Temp:  [97.6 °F (36.4 °C)-98.7 °F (37.1 °C)] 97.6 °F (36.4 °C)  Pulse:  [] 82  Resp:  [18-19] 18  SpO2:  [91 %-97 %] 97 %  BP: ()/(47-57) 82/47     Weight: 73.2 kg (161 lb 6 oz)  Body mass index is 21.89 kg/m².    Intake/Output Summary (Last 24 hours) at 9/15/2022 1209  Last data filed at 9/15/2022 0600  Gross per 24 hour   Intake --   Output 50 ml   Net -50 ml        Physical Exam  Constitutional:       Appearance: He is well-developed. He is not ill-appearing or toxic-appearing.   HENT:      Head: Normocephalic and atraumatic.   Eyes:      Conjunctiva/sclera: Conjunctivae normal.      Pupils: Pupils are  equal, round, and reactive to light.   Neck:      Thyroid: No thyromegaly.      Vascular: No JVD.   Cardiovascular:      Rate and Rhythm: Normal rate and regular rhythm.      Heart sounds: Normal heart sounds. No murmur heard.    No friction rub. No gallop.   Pulmonary:      Effort: Pulmonary effort is normal. No respiratory distress.      Breath sounds: Normal breath sounds. No wheezing or rales.   Abdominal:      General: Bowel sounds are normal. There is no distension.      Palpations: Abdomen is soft. There is no mass.      Tenderness: There is no abdominal tenderness. There is no guarding or rebound.      Hernia: No hernia is present.   Musculoskeletal:         General: No deformity. Normal range of motion.      Cervical back: Normal range of motion and neck supple.   Skin:     General: Skin is warm and dry.   Neurological:      Mental Status: He is alert and oriented to person, place, and time.      Cranial Nerves: No cranial nerve deficit.   Psychiatric:         Behavior: Behavior normal.       Significant Labs: All pertinent labs within the past 24 hours have been reviewed.  CBC:   Recent Labs   Lab 09/14/22  0434 09/15/22  0505   WBC 7.45 21.40*   HGB 9.8* 9.0*   HCT 30.2* 27.1*   PLT 47* 46*       CMP:   Recent Labs   Lab 09/14/22 0434 09/15/22  0505   * 127*   K 3.3* 4.4    96   CO2 23 21*   * 150*   BUN 34* 40*   CREATININE 1.1 2.0*   CALCIUM 8.7 8.6*   PROT 6.3 5.7*   ALBUMIN 4.0 3.5   BILITOT 6.2* 6.2*   ALKPHOS 89 85   * 108*   * 126*   ANIONGAP 8 10       Coagulation:   Recent Labs   Lab 09/15/22  0505   INR 2.0*         Significant Imaging: I have reviewed all pertinent imaging results/findings within the past 24 hours.

## 2022-09-15 NOTE — PT/OT/SLP PROGRESS
"Occupational Therapy   CoTreatment/DC  CoTx performed to optimize pt participation and assessment of full functional capacity.       Name: Porfirio Wilson  MRN: [id number]  Admitting Diagnosis:  Acute hypoxemic respiratory failure       Recommendations:     Discharge Recommendations: rehabilitation facility  Discharge Equipment Recommendations:  none  Barriers to discharge:  Inaccessible home environment, Decreased caregiver support    Assessment:     Porfirio Wilson is a 37 y.o. male with a medical diagnosis of Acute hypoxemic respiratory failure.  He presents with good tolerance to session w improved func mob and ADL completion, no AD required. Pt w generalized weakness 2/2 deconditioning however good endurance for task completion and UE exercises.  Performance deficits affecting function are weakness, impaired cardiopulmonary response to activity.   2/2 patient improvement and return to baseline, pt no longer requires acute OT services at this time.  Rehab Prognosis:  Good; patient would benefit from acute skilled OT services to address these deficits and reach maximum level of function.       Plan:     Patient to be seen 3 x/week to address the above listed problems via self-care/home management, therapeutic activities, therapeutic exercises  Plan of Care Expires: 10/08/22  Plan of Care Reviewed with: patient    Subjective   "I feel good I think I can do all of these exercises on my own"  Pain/Comfort:  Pain Rating 1:  (did not rate)    Objective:     Communicated with: RN prior to session.  Patient found supine with chest tube, peripheral IV, pulse ox (continuous), telemetry upon OT entry to room.    General Precautions: Standard, contact, fall, airborne, droplet   Orthopedic Precautions:N/A   Braces:    Respiratory Status: Room air     Occupational Performance:     Bed Mobility:    Patient completed Supine to Sit with supervision     Functional Mobility/Transfers:  Patient completed Sit <> Stand Transfer with " supervision  with  no assistive device   Patient completed Toilet Transfer Step Transfer technique with modified independence with  no AD  Functional Mobility: pt completed functional ambulation to restroom to simulate household mobility requiring  supvsn and no AD    Activities of Daily Living:  Toileting: independence ind for clothing, pericare, and hand hygiene      [facility] 6 Click ADL: 24    Treatment & Education:  Pt educated on scope of practice and importance of daily functional mobility.   Pt educated on safety precautions during transfers  Pt updated on POC and discharge recc  White board updated to reflect pt status and visual reminder included on board instructing her to call for nurse as needed.      Patient left sitting edge of bed with all lines intact and call button in reach    GOALS:   Multidisciplinary Problems       Occupational Therapy Goals          Problem: Occupational Therapy    Goal Priority Disciplines Outcome Interventions   Occupational Therapy Goal     OT, PT/OT Ongoing, Progressing    Description: Goals set on 9/8 with expiration date 10/8:  Patient will increase functional independence with ADLs by performing:    Supine <> Sit with Stand-by Assistance.  Feeding with Stand-by Assistance.  Grooming while standing at sink with Stand-by Assistance.  UB Dressing with Stand-by Assistance.  LB Dressing with Stand-by Assistance.  Step transfer with Stand-by Assistance with DME as needed.  Pt will demonstrate understanding of education provided regarding energy conservation and task modification through teach-back method.                          Time Tracking:     OT Date of Treatment: 09/15/22  OT Start Time: 1437  OT Stop Time: 1500  OT Total Time (min): 23 min    Billable Minutes:Self Care/Home Management 10  Therapeutic Activity 13    OT/JAIME: OT          9/15/2022

## 2022-09-15 NOTE — PLAN OF CARE
Problem: Occupational Therapy  Goal: Occupational Therapy Goal  Description: Goals set on 9/8 with expiration date 10/8:  Patient will increase functional independence with ADLs by performing:    Supine <> Sit with Stand-by Assistance. - MET  Feeding with Stand-by Assistance.- MET  Grooming while standing at sink with Stand-by Assistance. - MET  UB Dressing with Stand-by Assistance. - MET  LB Dressing with Stand-by Assistance.  - MET  Step transfer with Stand-by Assistance with DME as needed. - MET  Pt will demonstrate understanding of education provided regarding energy conservation and task modification through teach-back method. - MET    D/C 9/15/2022  Kelly Urbina OT     Outcome: Met

## 2022-09-15 NOTE — ASSESSMENT & PLAN NOTE
Seen on 9/15. Patient with acute kidney injury likely multifactorial. Could be related to relative hypotension MAPs dropped by 10-15 overnight after pleurodesis. Could be related to overdiuresis coupled with emesis causing hypovolemia and pre-renal MAN. MAN is currently worsening. Labs reviewed- Renal function/electrolytes with Estimated Creatinine Clearance: 52.9 mL/min (A) (based on SCr of 2 mg/dL (H)). according to latest data. Monitor urine output and serial BMP and adjust therapy as needed. Avoid nephrotoxins and renally dose meds for GFR listed above.   - Check urine sodium/osm/creatinine  - Added midodrine given hypotension  - Discontinued lasix/spironolactone for now  - Ordered UA and BCx  - Received albumin 25g x1 and 250cc NS  - Repeat BMP this afternoon

## 2022-09-15 NOTE — PLAN OF CARE
Recommendations     1. Continue current Regular diet + ONS.   2. RD to monitor & follow-up.     Goals: Meet % EEN, EPN by RD  f/u date  Nutrition Goal Status: new  Communication of RD Recs: reviewed with RN

## 2022-09-15 NOTE — ASSESSMENT & PLAN NOTE
MELD-Na score: 32 at 9/15/2022  5:05 AM  MELD score: 28 at 9/15/2022  5:05 AM  Calculated from:  Serum Creatinine: 2.0 mg/dL at 9/15/2022  5:05 AM  Serum Sodium: 127 mmol/L at 9/15/2022  5:05 AM  Total Bilirubin: 6.2 mg/dL at 9/15/2022  5:05 AM  INR(ratio): 2.0 at 9/15/2022  5:05 AM  Age: 37 years  Secondary to alcoholic cirrhosis. Not a transplant candidate given etoh use last admission but patient's PETH is <10 indicating continued abstinence which bodes well for patient. He needs to continue AA meetings on discharge. -holding home propranolol in setting of hypotension - can resume as clinically appropriate  - Stopped 40mg lasix and 100mg spironolactone for volume given MAN  - Continuing bactrim for ppx while patient still has chest tube in place.  - Hepatology consulted

## 2022-09-15 NOTE — PT/OT/SLP PROGRESS
"Physical Therapy Co-Treatment/Discharge    Patient Name:  Porfirio Wilson   MRN:  [id number]    Co-treatment with OT due to pt's impairments and potential intolerance for two skilled therapy sessions.    Recommendations:     Discharge Recommendations:  outpatient PT   Discharge Equipment Recommendations: none   Barriers to discharge: None    Assessment:     Porfirio Wilson is a 37 y.o. male admitted with a medical diagnosis of Acute hypoxemic respiratory failure.  He presents with the following impairments/functional limitations:  weakness, impaired cardiopulmonary response to activity. Pt tolerated session well and was Supervision for bed mobility and STS and was SBA for gait ~100' with no AD and smooth reciprocal gait pattern. Pt reported discharge to outpt PT. Pt is functioning at baseline and does not require skilled acute PT services at this time.    Rehab Prognosis: Good; patient would benefit from acute skilled PT services to address these deficits and reach maximum level of function.    Recent Surgery: * No surgery found *      Plan:      (d/c from acute skilled PT services 9/15)   Plan of Care Expires:  10/09/22    Subjective     Chief Complaint: discomfort from chest tube  Patient/Family Comments/goals: "I can't wait to see my family again"  Pain/Comfort:  Pain Rating 1: other (see comments) (did not rate)  Location - Side 1: Right  Location 1: chest  Pain Addressed 1: Distraction      Objective:     Communicated with nursing prior to session.  Patient found HOB elevated with chest tube, peripheral IV, telemetry, pulse ox (continuous) upon PT entry to room.     General Precautions: Standard, airborne, contact, fall, droplet   Orthopedic Precautions:N/A   Braces: N/A  Respiratory Status: Room air     Functional Mobility:  Bed Mobility:     Supine to Sit: supervision  Sit to Supine: independence  Transfers:     Sit to Stand:  supervision with no AD  Gait: SBA ~100' with no AD, pt carried chest tube with no " reported SOB, smooth reciprocal gait pattern      AM-PAC 6 CLICK MOBILITY  Turning over in bed (including adjusting bedclothes, sheets and blankets)?: 4  Sitting down on and standing up from a chair with arms (e.g., wheelchair, bedside commode, etc.): 4  Moving to and from a bed to a chair (including a wheelchair)?: 4  Need to walk in hospital room?: 4  Climbing 3-5 steps with a railing?: 4       Therapeutic Activities and Exercises:   Sitting EOB BLE AROM: marches, LAQ, ankle pumps x 10  Educated pt on PT POC and mobility throughout the day and safety with ambulation; instructed pt on performing BLE therapeutic exercises 10x/ hr; discussed discharge plans with pt who reported discharge to outpt PT and discussed any needs for acute skilled PT services. Pt verbalized understanding.    Patient left supine with all lines intact and call button in reach..    GOALS:   Multidisciplinary Problems       Physical Therapy Goals          Problem: Physical Therapy    Goal Priority Disciplines Outcome Goal Variances Interventions   Physical Therapy Goal     PT, PT/OT Ongoing, Progressing     Description: Goals to be met by:      Patient will increase functional independence with mobility by performin. Supine to sit with Modified [place]  2. Sit to supine with Modified [place]  3. Sit to stand transfer with Modified [place]  4. Gait  x 100 feet with Stand-by Assistance using LRAD or no AD.   5. Ascend/Descend 6 inch curb step with Stand-by Assistance using LRAD or no AD.                         Time Tracking:     PT Received On: 09/15/22  PT Start Time: 1437     PT Stop Time: 1500  PT Total Time (min): 23 min     Billable Minutes: Gait Training 13 min and Therapeutic Activity 10 min    Treatment Type: Treatment  PT/PTA: PT     PTA Visit Number: 0     09/15/2022

## 2022-09-15 NOTE — PT/OT/SLP DISCHARGE
Occupational Therapy Discharge Summary    Porfirio Wilson  MRN: [id number]   Principal Problem: Acute hypoxemic respiratory failure      Patient Discharged from acute Occupational Therapy on 9/15/2022.  Please refer to prior OT note dated 9/15/2022 for functional status.    Assessment:      Patient has met all goals and is does not require OT services at this time. Pt instructed on continued daily OOB mobility, energy conservation and UE exercises.     Objective:     GOALS:   Multidisciplinary Problems       Occupational Therapy Goals       Not on file              Multidisciplinary Problems (Resolved)          Problem: Occupational Therapy    Goal Priority Disciplines Outcome Interventions   Occupational Therapy Goal   (Resolved)     OT, PT/OT Met    Description: Goals set on 9/8 with expiration date 10/8:  Patient will increase functional independence with ADLs by performing:    Supine <> Sit with Stand-by Assistance. - MET  Feeding with Stand-by Assistance.- MET  Grooming while standing at sink with Stand-by Assistance. - MET  UB Dressing with Stand-by Assistance. - MET  LB Dressing with Stand-by Assistance.  - MET  Step transfer with Stand-by Assistance with DME as needed. - MET  Pt will demonstrate understanding of education provided regarding energy conservation and task modification through teach-back method. - MET                         Reasons for Discontinuation of Therapy Services  Satisfactory goal achievement.      Plan:     Patient Discharged to: Inpatient Rehab     9/15/2022

## 2022-09-15 NOTE — PROGRESS NOTES
[facility] Medicine  Progress Note    Patient Name: Porfirio Wilson  MRN: [id number]  Patient Class: IP- Inpatient   Admission Date: 9/3/2022  Length of Stay: 12 days  Attending Physician: Taryn Linares MD  Primary Care Provider: No primary care provider on file.        Subjective:     Principal Problem:Acute hypoxemic respiratory failure        HPI:  Patient is a 36 y.o. male with significant past medical history of ETOH cirrhosis (complicated by presence of esophageal varices s/p band ligation), bipolar d/o and pancreatitis presented to [facility] on 9/3 complaining of shortness of breath x2 hours with associated fever/chills, cough and diaphoresis. Patient was intubated in the ED due to degree of respiratory distress. Cxry showed right pleural effusion and pneumothorax and focal consolidation on the left. He was found to be Covid positive. Patient had a right side chest tube placed. Labs at OSH significant for leukocytosis of 16, procal 0.46, lactic 5, , INR 2.1, negative troponin, ETOH negative and ammonia 86.  Pt was tx w/ levaquin & flagyl, as well as given IVF & vit K for coagulopathy associated w/ liver dz. Pt is currently requiring levo for map > 65. Last reported ETOH drink ~ 6 weeks ago.      Patient has been [facility] for higher level of care and Hepatology services.       Overview/Hospital Course:  Since transfer, chest tube drainage has been monitored. It has actually gone up 9/13-9/14 and currently plans are for bedside pleurodesis with CTS. Bedside pleurodesis performed 9/14. Hypotension/pain overnight following pleurodesis. Patient following morning reported feeling much better.      Interval History: No acute issues overnight; however he did have chest pain following pleurodesis. He also had episodes of emesis and decreased PO intake afterwards likely causing MAN.    Review of Systems   Constitutional:  Negative for chills  and fever.   HENT:  Negative for congestion and sore throat.    Eyes:  Negative for photophobia and visual disturbance.   Respiratory:  Negative for cough and shortness of breath.    Cardiovascular:  Negative for chest pain (around chest tube site) and palpitations.   Gastrointestinal:  Negative for abdominal pain, blood in stool, constipation, diarrhea, nausea and vomiting.   Endocrine: Negative for cold intolerance and heat intolerance.   Genitourinary:  Negative for dysuria and hematuria.   Musculoskeletal:  Negative for arthralgias and myalgias.   Skin:  Negative for rash and wound.   Allergic/Immunologic: Negative for environmental allergies and food allergies.   Neurological:  Negative for seizures and numbness.   Hematological:  Negative for adenopathy. Does not bruise/bleed easily.   Psychiatric/Behavioral:  Negative for hallucinations and suicidal ideas.    Objective:     Vital Signs (Most Recent):  Temp: 97.6 °F (36.4 °C) (09/15/22 0804)  Pulse: 82 (09/15/22 1114)  Resp: 18 (09/15/22 0804)  BP: (!) 82/47 (09/15/22 0854)  SpO2: 97 % (09/15/22 0804)   Vital Signs (24h Range):  Temp:  [97.6 °F (36.4 °C)-98.7 °F (37.1 °C)] 97.6 °F (36.4 °C)  Pulse:  [] 82  Resp:  [18-19] 18  SpO2:  [91 %-97 %] 97 %  BP: ()/(47-57) 82/47     Weight: 73.2 kg (161 lb 6 oz)  Body mass index is 21.89 kg/m².    Intake/Output Summary (Last 24 hours) at 9/15/2022 1209  Last data filed at 9/15/2022 0600  Gross per 24 hour   Intake --   Output 50 ml   Net -50 ml        Physical Exam  Constitutional:       Appearance: He is well-developed. He is not ill-appearing or toxic-appearing.   HENT:      Head: Normocephalic and atraumatic.   Eyes:      Conjunctiva/sclera: Conjunctivae normal.      Pupils: Pupils are equal, round, and reactive to light.   Neck:      Thyroid: No thyromegaly.      Vascular: No JVD.   Cardiovascular:      Rate and Rhythm: Normal rate and regular rhythm.      Heart sounds: Normal heart sounds. No murmur  heard.    No friction rub. No gallop.   Pulmonary:      Effort: Pulmonary effort is normal. No respiratory distress.      Breath sounds: Normal breath sounds. No wheezing or rales.   Abdominal:      General: Bowel sounds are normal. There is no distension.      Palpations: Abdomen is soft. There is no mass.      Tenderness: There is no abdominal tenderness. There is no guarding or rebound.      Hernia: No hernia is present.   Musculoskeletal:         General: No deformity. Normal range of motion.      Cervical back: Normal range of motion and neck supple.   Skin:     General: Skin is warm and dry.   Neurological:      Mental Status: He is alert and oriented to person, place, and time.      Cranial Nerves: No cranial nerve deficit.   Psychiatric:         Behavior: Behavior normal.       Significant Labs: All pertinent labs within the past 24 hours have been reviewed.  CBC:   Recent Labs   Lab 09/14/22  0434 09/15/22  0505   WBC 7.45 21.40*   HGB 9.8* 9.0*   HCT 30.2* 27.1*   PLT 47* 46*       CMP:   Recent Labs   Lab 09/14/22  0434 09/15/22  0505   * 127*   K 3.3* 4.4    96   CO2 23 21*   * 150*   BUN 34* 40*   CREATININE 1.1 2.0*   CALCIUM 8.7 8.6*   PROT 6.3 5.7*   ALBUMIN 4.0 3.5   BILITOT 6.2* 6.2*   ALKPHOS 89 85   * 108*   * 126*   ANIONGAP 8 10       Coagulation:   Recent Labs   Lab 09/15/22  0505   INR 2.0*         Significant Imaging: I have reviewed all pertinent imaging results/findings within the past 24 hours.      Assessment/Plan:      * Acute hypoxemic respiratory failure  RESOLVED, now on ROOM AIR  Patient presented with Hypoxic Respiratory failure which is Acute.  he is not on home oxygen. Supplemental oxygen was provided and noted-  .   Signs/symptoms of respiratory failure include- tachypnea and increased work of breathing. 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 Labs and images were reviewed. Patient Has not had a recent ABG.  Initial  chest CT with bilateral patchy infiltrates consistent with Covid-19. Right plural effusion (suspect hepatohydrothorax) + pneumothorax (unclear if procedural complication) s/p chest tube placement at OSH. CT imaging concerning for chest tube in lung parenchyma vs lung re-expansion around tube. CTS evaluation with recs for stabilization prior to any intervention    - Finished remdes and dexamethasone  - Discontinued abx  - Output was improving from chest tube, but later worsened. Patient now needing pleurodesis to help reduce output and hopefully remove chest tube in 48 hrs.    MAN (acute kidney injury)  Seen on 9/15. Patient with acute kidney injury likely multifactorial. Could be related to relative hypotension MAPs dropped by 10-15 overnight after pleurodesis. Could be related to overdiuresis coupled with emesis causing hypovolemia and pre-renal MAN. MAN is currently worsening. Labs reviewed- Renal function/electrolytes with Estimated Creatinine Clearance: 52.9 mL/min (A) (based on SCr of 2 mg/dL (H)). according to latest data. Monitor urine output and serial BMP and adjust therapy as needed. Avoid nephrotoxins and renally dose meds for GFR listed above.   - Check urine sodium/osm/creatinine  - Added midodrine given hypotension  - Discontinued lasix/spironolactone for now  - Ordered UA and BCx  - Received albumin 25g x1 and 250cc NS  - Repeat BMP this afternoon    Seizure  Reported to have full tonic-clonic seizure like activity lasting ~ 1 min and resolved spontaneously at onset of transfer from OSH per EMS. None witnessed this hospitalization  - Head CT negative for acute intracranial abnormality       Decompensated hepatic cirrhosis  MELD-Na score: 32 at 9/15/2022  5:05 AM  MELD score: 28 at 9/15/2022  5:05 AM  Calculated from:  Serum Creatinine: 2.0 mg/dL at 9/15/2022  5:05 AM  Serum Sodium: 127 mmol/L at 9/15/2022  5:05 AM  Total Bilirubin: 6.2 mg/dL at 9/15/2022  5:05 AM  INR(ratio): 2.0 at 9/15/2022  5:05  AM  Age: 37 years  Secondary to alcoholic cirrhosis. Not a transplant candidate given etoh use last admission but patient's PETH is <10 indicating continued abstinence which bodes well for patient. He needs to continue AA meetings on discharge. -holding home propranolol in setting of hypotension - can resume as clinically appropriate  - Stopped 40mg lasix and 100mg spironolactone for volume given MAN  - Continuing bactrim for ppx while patient still has chest tube in place.  - Hepatology consulted        VTE Risk Mitigation (From admission, onward)           Ordered     IP VTE LOW RISK PATIENT  Once         09/03/22 2258     Place sequential compression device  Until discontinued         09/03/22 2258                    Discharge Planning   ENIO: 9/16/2022     Code Status: Full Code   Is the patient medically ready for discharge?: No    Reason for patient still in hospital (select all that apply): Patient trending condition  Discharge Plan A: Home with family   Discharge Delays: None known at this time              Taryn Linares MD  Department of Hospital Medicine   [facility] - [facility]

## 2022-09-15 NOTE — NURSING
Morning bedside handoff report complete.  His bp is low at morning vitals.  I notified Dr. Arora who readily addressed the issue and made medication adjustments.  No sign of distress noted at this time.  His chest tube continues to water seal and suction and is tolerated well.

## 2022-09-15 NOTE — PLAN OF CARE
Problem: Adult Inpatient Plan of Care  Goal: Plan of Care Review  Outcome: Ongoing, Progressing  Goal: Patient-Specific Goal (Individualized)  Outcome: Ongoing, Progressing  Goal: Absence of Hospital-Acquired Illness or Injury  Outcome: Ongoing, Progressing  Goal: Optimal Comfort and Wellbeing  Outcome: Ongoing, Progressing     Problem: Fall Injury Risk  Goal: Absence of Fall and Fall-Related Injury  Outcome: Ongoing, Progressing     Problem: Infection  Goal: Absence of Infection Signs and Symptoms  Outcome: Ongoing, Progressing     Problem: Skin Injury Risk Increased  Goal: Skin Health and Integrity  Outcome: Ongoing, Progressing     Problem: Fluid and Electrolyte Imbalance (Acute Kidney Injury/Impairment)  Goal: Fluid and Electrolyte Balance  Outcome: Ongoing, Progressing     Problem: Oral Intake Inadequate (Acute Kidney Injury/Impairment)  Goal: Optimal Nutrition Intake  Outcome: Ongoing, Progressing     Problem: Renal Function Impairment (Acute Kidney Injury/Impairment)  Goal: Effective Renal Function  Outcome: Ongoing, Progressing   He has had a awesome day.  Earlier he had some asymptomatic issues with low blood pressure but Dr. Arora did correction and improved his blood pressure.  His chest tube continues to put out thin light yellow fluid approx 125 ml today and is water sealed to wall suction.  I did change his chest tube dressing with aseptic technique.  No sign of distress noted at this time.

## 2022-09-15 NOTE — PT/OT/SLP DISCHARGE
Physical Therapy Discharge Summary    Name: Porfirio Wilson  MRN: [id number]   Principal Problem: Acute hypoxemic respiratory failure     Patient Discharged from acute Physical Therapy on 9/15/2022.  Please refer to prior PT noted date on 9/15/2022 for functional status.     Assessment:     Pt  is performing at functional baseline and has no needs for acute PT services at this time. Pt was instructed on the importance of ambulating in room throughout the day and performing BLE exercises 10x/hour. Pt verbalized understanding.    Objective:     GOALS:   Multidisciplinary Problems       Physical Therapy Goals          Problem: Physical Therapy    Goal Priority Disciplines Outcome Goal Variances Interventions   Physical Therapy Goal     PT, PT/OT Ongoing, Progressing     Description: Goals to be met by:      Patient will increase functional independence with mobility by performin. Supine to sit with Modified [place]  2. Sit to supine with Modified [place]  3. Sit to stand transfer with Modified [place]  4. Gait  x 100 feet with Stand-by Assistance using LRAD or no AD.   5. Ascend/Descend 6 inch curb step with Stand-by Assistance using LRAD or no AD.                         Reasons for Discontinuation of Therapy Services  Pt has no needs for acute skilled PT services at this time and is performing at functional baseline. Pt able to safely ambulate in room and perform therapeutic exercises on his own.       Plan:     Patient Discharged to: Outpatient Therapy Services.      9/15/2022

## 2022-09-15 NOTE — PROGRESS NOTES
[facility] - Tele[facility] Step[facility]  Adult Nutrition  Consult Note    SUMMARY     Recommendations    1. Continue current Regular diet + ONS.   2. RD to monitor & follow-up.    Goals: Meet % EEN, EPN by RD  f/u date  Nutrition Goal Status: new  Communication of RD Recs: reviewed with RN    Assessment and Plan    Nutrition Problem:  Increased nutrient needs    Related to (etiology):   Physiological causes     Signs and Symptoms (as evidenced by):   COVID-19    Interventions(treatment strategy):  Collaboration of nutrition care w/ other providers  ONS    Nutrition Diagnosis Status:   New    Reason for Assessment    Reason For Assessment: length of stay  Diagnosis: other (see comments) (Resp. fx)  Relevant Medical History: Cirrhosis  Interdisciplinary Rounds: did not attend    General Information Comments: +COVID-19. Pt tolerating diet + ONS w/ 100% PO intake. UBW: 160-170#, per chart review. Pt appears thin, however no indicators of malnutrition noted. Will monitor.   Nutrition Discharge Planning: Adequate PO intake    Nutrition/Diet History    Spiritual, Cultural Beliefs, Bahai Practices, Values that Affect Care: no  Factors Affecting Nutritional Intake: None identified at this time    Anthropometrics    Temp: 98.7 °F (37.1 °C)  Height Method: Stated  Height: 6' (182.9 cm)  Height (inches): 72 in  Weight Method: Bed Scale  Weight: 73.2 kg (161 lb 6 oz)  Weight (lb): 161.38 lb  Ideal Body Weight (IBW), Male: 178 lb  % Ideal Body Weight, Male (lb): 90.66 %  BMI (Calculated): 21.9  BMI Grade: 18.5-24.9 - normal    Lab/Procedures/Meds    Pertinent Labs Reviewed: reviewed  Pertinent Labs Comments: Na 127, BUN 40, Creat 2, GFR 43.5  Pertinent Medications Reviewed: reviewed  Pertinent Medications Comments: Folic acid, Lactulose, Thiamine    Estimated/Assessed Needs    Weight Used For Calorie Calculations: 73.2 kg (161 lb 6 oz)    Energy Calorie Requirements (kcal): 2125 kcal/d  Energy Need Method: [place]-St Pan (1.25  PAL)    Protein Requirements: 73-88 g/d (1-1.2 g/kg)  Weight Used For Protein Calculations: 73.2 kg (161 lb 6 oz)    Estimated Fluid Requirement Method: other (see comments) (Per MD or 1 mL/kcal)  RDA Method (mL): 2125    Nutrition Prescription Ordered    Current Diet Order: Regular  Oral Nutrition Supplement: Boost Plus ONS    Evaluation of Received Nutrient/Fluid Intake    I/O: -5.3L since admit    Comments: LBM: 9/13    Tolerance: tolerating    Nutrition Risk    Level of Risk/Frequency of Follow-up:  (1x/week)     Monitor and Evaluation    Food and Nutrient Intake: energy intake, food and beverage intake  Food and Nutrient Adminstration: diet order  Physical Activity and Function: nutrition-related ADLs and IADLs  Anthropometric Measurements: weight, weight change  Biochemical Data, Medical Tests and Procedures: lipid profile, inflammatory profile, glucose/endocrine profile, gastrointestinal profile, electrolyte and renal panel  Nutrition-Focused Physical Findings: overall appearance     Nutrition Follow-Up    RD Follow-up?: Yes

## 2022-09-16 PROBLEM — R33.9 URINARY RETENTION: Status: RESOLVED | Noted: 2022-09-05 | Resolved: 2022-09-16

## 2022-09-16 LAB
ALBUMIN SERPL BCP-MCNC: 3.4 G/DL (ref 3.5–5.2)
ALP SERPL-CCNC: 101 U/L (ref 55–135)
ALT SERPL W/O P-5'-P-CCNC: 104 U/L (ref 10–44)
ANION GAP SERPL CALC-SCNC: 7 MMOL/L (ref 8–16)
AST SERPL-CCNC: 80 U/L (ref 10–40)
BASOPHILS # BLD AUTO: 0.01 K/UL (ref 0–0.2)
BASOPHILS NFR BLD: 0.1 % (ref 0–1.9)
BILIRUB SERPL-MCNC: 5 MG/DL (ref 0.1–1)
BUN SERPL-MCNC: 46 MG/DL (ref 6–20)
CALCIUM SERPL-MCNC: 8.4 MG/DL (ref 8.7–10.5)
CHLORIDE SERPL-SCNC: 97 MMOL/L (ref 95–110)
CO2 SERPL-SCNC: 22 MMOL/L (ref 23–29)
CREAT SERPL-MCNC: 1.3 MG/DL (ref 0.5–1.4)
DIFFERENTIAL METHOD: ABNORMAL
EOSINOPHIL # BLD AUTO: 0.3 K/UL (ref 0–0.5)
EOSINOPHIL NFR BLD: 3.6 % (ref 0–8)
ERYTHROCYTE [DISTWIDTH] IN BLOOD BY AUTOMATED COUNT: 18.2 % (ref 11.5–14.5)
EST. GFR  (NO RACE VARIABLE): >60 ML/MIN/1.73 M^2
GLUCOSE SERPL-MCNC: 87 MG/DL (ref 70–110)
HCT VFR BLD AUTO: 26.6 % (ref 40–54)
HGB BLD-MCNC: 9.2 G/DL (ref 14–18)
IMM GRANULOCYTES # BLD AUTO: 0.05 K/UL (ref 0–0.04)
IMM GRANULOCYTES NFR BLD AUTO: 0.5 % (ref 0–0.5)
INR PPP: 2.1 (ref 0.8–1.2)
LYMPHOCYTES # BLD AUTO: 0.4 K/UL (ref 1–4.8)
LYMPHOCYTES NFR BLD: 3.8 % (ref 18–48)
MAGNESIUM SERPL-MCNC: 2.1 MG/DL (ref 1.6–2.6)
MCH RBC QN AUTO: 28.8 PG (ref 27–31)
MCHC RBC AUTO-ENTMCNC: 34.6 G/DL (ref 32–36)
MCV RBC AUTO: 83 FL (ref 82–98)
MONOCYTES # BLD AUTO: 1.1 K/UL (ref 0.3–1)
MONOCYTES NFR BLD: 11.8 % (ref 4–15)
NEUTROPHILS # BLD AUTO: 7.3 K/UL (ref 1.8–7.7)
NEUTROPHILS NFR BLD: 80.2 % (ref 38–73)
NRBC BLD-RTO: 0 /100 WBC
PHOSPHATE SERPL-MCNC: 3.1 MG/DL (ref 2.7–4.5)
PLATELET # BLD AUTO: 36 K/UL (ref 150–450)
PLATELET BLD QL SMEAR: ABNORMAL
PMV BLD AUTO: ABNORMAL FL (ref 9.2–12.9)
POTASSIUM SERPL-SCNC: 3.9 MMOL/L (ref 3.5–5.1)
PROT SERPL-MCNC: 5.7 G/DL (ref 6–8.4)
PROTHROMBIN TIME: 20.7 SEC (ref 9–12.5)
RBC # BLD AUTO: 3.19 M/UL (ref 4.6–6.2)
SODIUM SERPL-SCNC: 126 MMOL/L (ref 136–145)
WBC # BLD AUTO: 9.15 K/UL (ref 3.9–12.7)

## 2022-09-16 PROCEDURE — 84100 ASSAY OF PHOSPHORUS: CPT | Performed by: NURSE PRACTITIONER

## 2022-09-16 PROCEDURE — 85025 COMPLETE CBC W/AUTO DIFF WBC: CPT | Performed by: NURSE PRACTITIONER

## 2022-09-16 PROCEDURE — 27000207 HC ISOLATION

## 2022-09-16 PROCEDURE — 25000003 PHARM REV CODE 250: Performed by: HOSPITALIST

## 2022-09-16 PROCEDURE — 99232 PR SUBSEQUENT HOSPITAL CARE,LEVL II: ICD-10-PCS | Mod: ,,, | Performed by: STUDENT IN AN ORGANIZED HEALTH CARE EDUCATION/TRAINING PROGRAM

## 2022-09-16 PROCEDURE — 20600001 HC STEP DOWN PRIVATE ROOM

## 2022-09-16 PROCEDURE — 83735 ASSAY OF MAGNESIUM: CPT | Performed by: NURSE PRACTITIONER

## 2022-09-16 PROCEDURE — 36415 COLL VENOUS BLD VENIPUNCTURE: CPT | Performed by: NURSE PRACTITIONER

## 2022-09-16 PROCEDURE — 85610 PROTHROMBIN TIME: CPT | Performed by: NURSE PRACTITIONER

## 2022-09-16 PROCEDURE — 99232 SBSQ HOSP IP/OBS MODERATE 35: CPT | Mod: ,,, | Performed by: STUDENT IN AN ORGANIZED HEALTH CARE EDUCATION/TRAINING PROGRAM

## 2022-09-16 PROCEDURE — 80053 COMPREHEN METABOLIC PANEL: CPT | Performed by: NURSE PRACTITIONER

## 2022-09-16 PROCEDURE — 25000003 PHARM REV CODE 250

## 2022-09-16 PROCEDURE — 25000003 PHARM REV CODE 250: Performed by: STUDENT IN AN ORGANIZED HEALTH CARE EDUCATION/TRAINING PROGRAM

## 2022-09-16 RX ADMIN — RIFAXIMIN 550 MG: 550 TABLET ORAL at 08:09

## 2022-09-16 RX ADMIN — MIDODRINE HYDROCHLORIDE 10 MG: 5 TABLET ORAL at 10:09

## 2022-09-16 RX ADMIN — SULFAMETHOXAZOLE AND TRIMETHOPRIM 1 TABLET: 800; 160 TABLET ORAL at 08:09

## 2022-09-16 RX ADMIN — Medication 100 MG: at 08:09

## 2022-09-16 RX ADMIN — TRAMADOL HYDROCHLORIDE 50 MG: 50 TABLET, COATED ORAL at 05:09

## 2022-09-16 RX ADMIN — MIDODRINE HYDROCHLORIDE 10 MG: 5 TABLET ORAL at 05:09

## 2022-09-16 RX ADMIN — OXYCODONE 5 MG: 5 TABLET ORAL at 10:09

## 2022-09-16 RX ADMIN — LACTULOSE 20 G: 20 SOLUTION ORAL at 08:09

## 2022-09-16 RX ADMIN — PANTOPRAZOLE SODIUM 40 MG: 40 TABLET, DELAYED RELEASE ORAL at 08:09

## 2022-09-16 RX ADMIN — RIFAXIMIN 550 MG: 550 TABLET ORAL at 10:09

## 2022-09-16 RX ADMIN — FOLIC ACID 1 MG: 1 TABLET ORAL at 08:09

## 2022-09-16 RX ADMIN — MIDODRINE HYDROCHLORIDE 10 MG: 5 TABLET ORAL at 02:09

## 2022-09-16 RX ADMIN — LACTULOSE 20 G: 20 SOLUTION ORAL at 02:09

## 2022-09-16 NOTE — SUBJECTIVE & OBJECTIVE
Interval History: No acute issues overnight. Patient feels well. CT to remain additional day given output from previous day.    Review of Systems   Constitutional:  Negative for chills and fever.   HENT:  Negative for congestion and sore throat.    Eyes:  Negative for photophobia and visual disturbance.   Respiratory:  Negative for cough and shortness of breath.    Cardiovascular:  Negative for chest pain (around chest tube site) and palpitations.   Gastrointestinal:  Negative for abdominal pain, blood in stool, constipation, diarrhea, nausea and vomiting.   Endocrine: Negative for cold intolerance and heat intolerance.   Genitourinary:  Negative for dysuria and hematuria.   Musculoskeletal:  Negative for arthralgias and myalgias.   Skin:  Negative for rash and wound.   Allergic/Immunologic: Negative for environmental allergies and food allergies.   Neurological:  Negative for seizures and numbness.   Hematological:  Negative for adenopathy. Does not bruise/bleed easily.   Psychiatric/Behavioral:  Negative for hallucinations and suicidal ideas.    Objective:     Vital Signs (Most Recent):  Temp: 98.1 °F (36.7 °C) (09/16/22 1116)  Pulse: 71 (09/16/22 1116)  Resp: (!) 22 (09/16/22 1116)  BP: (!) 91/54 (09/16/22 1116)  SpO2: 99 % (09/16/22 1116)   Vital Signs (24h Range):  Temp:  [96.4 °F (35.8 °C)-98.7 °F (37.1 °C)] 98.1 °F (36.7 °C)  Pulse:  [56-92] 71  Resp:  [16-22] 22  SpO2:  [94 %-100 %] 99 %  BP: (89-99)/(50-56) 91/54     Weight: 73.2 kg (161 lb 6 oz)  Body mass index is 21.89 kg/m².    Intake/Output Summary (Last 24 hours) at 9/16/2022 1304  Last data filed at 9/16/2022 1121  Gross per 24 hour   Intake 532.95 ml   Output 510 ml   Net 22.95 ml        Physical Exam  Constitutional:       Appearance: He is well-developed. He is not ill-appearing or toxic-appearing.   HENT:      Head: Normocephalic and atraumatic.   Eyes:      Conjunctiva/sclera: Conjunctivae normal.      Pupils: Pupils are equal, round, and  reactive to light.   Neck:      Thyroid: No thyromegaly.      Vascular: No JVD.   Cardiovascular:      Rate and Rhythm: Normal rate and regular rhythm.      Heart sounds: Normal heart sounds. No murmur heard.    No friction rub. No gallop.   Pulmonary:      Effort: Pulmonary effort is normal. No respiratory distress.      Breath sounds: Normal breath sounds. No wheezing or rales.   Abdominal:      General: Bowel sounds are normal. There is no distension.      Palpations: Abdomen is soft. There is no mass.      Tenderness: There is no abdominal tenderness. There is no guarding or rebound.      Hernia: No hernia is present.   Musculoskeletal:         General: No deformity. Normal range of motion.      Cervical back: Normal range of motion and neck supple.   Skin:     General: Skin is warm and dry.   Neurological:      Mental Status: He is alert and oriented to person, place, and time.      Cranial Nerves: No cranial nerve deficit.   Psychiatric:         Behavior: Behavior normal.       Significant Labs: All pertinent labs within the past 24 hours have been reviewed.  CBC:   Recent Labs   Lab 09/15/22  0505 09/16/22  0507   WBC 21.40* 9.15   HGB 9.0* 9.2*   HCT 27.1* 26.6*   PLT 46* 36*       CMP:   Recent Labs   Lab 09/15/22  0505 09/15/22  1438 09/16/22  0507   * 129* 126*   K 4.4 4.2 3.9   CL 96 97 97   CO2 21* 25 22*   * 112* 87   BUN 40* 46* 46*   CREATININE 2.0* 2.0* 1.3   CALCIUM 8.6* 8.7 8.4*   PROT 5.7*  --  5.7*   ALBUMIN 3.5 3.7 3.4*   BILITOT 6.2*  --  5.0*   ALKPHOS 85  --  101   *  --  80*   *  --  104*   ANIONGAP 10 7* 7*       Coagulation:   Recent Labs   Lab 09/16/22  0507   INR 2.1*         Significant Imaging: I have reviewed all pertinent imaging results/findings within the past 24 hours.

## 2022-09-16 NOTE — NURSING
Critical platelet count of 36. Dr. Linares notified and acknowledged. No new orders at this time.

## 2022-09-16 NOTE — PROGRESS NOTES
[facility] - Telemetry Stepdown  Thoracic Surgery  Progress Note    Subjective:     Post-Op Info:  * No surgery found *         Interval History: No issues overnight. Remains on RA. R CT to suction with 235 cc output in 24 hrs, 100 cc overnight, no air leak.     Medications:  Continuous Infusions:  Scheduled Meds:   folic acid  1 mg Oral Daily    lactulose  20 g Oral TID    midodrine  10 mg Oral Q8H    pantoprazole  40 mg Oral Daily    rifAXImin  550 mg Oral BID    scopolamine  1 patch Transdermal Q3 Days    sodium chloride 0.9% 50 mL with bleomycin (BLEOCIN) 60 Units   Other Once    sulfamethoxazole-trimethoprim 800-160mg  1 tablet Oral Daily    thiamine  100 mg Oral Daily     PRN Meds:acetaminophen, aluminum & magnesium hydroxide-simethicone, methocarbamoL, ondansetron, oxyCODONE, oxyCODONE, sodium chloride 0.9%, traMADoL     Review of patient's allergies indicates:   Allergen Reactions    Ampicillin      Tolerated Zosyn 09/2022    Ceclor [cefaclor]      Tolerated Zosyn 09/2022     Objective:     Vital Signs (Most Recent):  Temp: 97.7 °F (36.5 °C) (09/16/22 0806)  Pulse: 63 (09/16/22 0806)  Resp: (!) 22 (09/16/22 0806)  BP: (!) 98/56 (09/16/22 0806)  SpO2: 100 % (09/16/22 0806)   Vital Signs (24h Range):  Temp:  [96.4 °F (35.8 °C)-98.7 °F (37.1 °C)] 97.7 °F (36.5 °C)  Pulse:  [56-92] 63  Resp:  [15-22] 22  SpO2:  [94 %-100 %] 100 %  BP: (81-99)/(47-56) 98/56     Intake/Output - Last 3 Shifts         09/14 0700  09/15 0659 09/15 0700 09/16 0659 09/16 0700 09/17 0659    P.O.       I.V. (mL/kg)  104.9 (1.4)     IV Piggyback  228.1     Total Intake(mL/kg)  333 (4.5)     Urine (mL/kg/hr)  0 (0) 275 (2.1)    Stool 0 0     Chest Tube 50 235     Total Output 50 235 275    Net -50 +98 -275           Urine Occurrence  5 x     Stool Occurrence 1 x 4 x             SpO2: 100 %  O2 Device (Oxygen Therapy): room air    Physical Exam  Vitals and nursing note reviewed.   Constitutional:       General: He is not in acute  distress.  Cardiovascular:      Rate and Rhythm: Normal rate.      Pulses: Normal pulses.   Pulmonary:      Effort: Pulmonary effort is normal. No respiratory distress.      Comments: R CT to suction; no air leak  Abdominal:      General: There is no distension.      Palpations: Abdomen is soft.      Tenderness: There is no abdominal tenderness.   Neurological:      Mental Status: He is alert.       Significant Labs:  BMP:   Recent Labs   Lab 09/16/22  0507   GLU 87   *   K 3.9   CL 97   CO2 22*   BUN 46*   CREATININE 1.3   CALCIUM 8.4*   MG 2.1     CBC:   Recent Labs   Lab 09/16/22  0507   WBC 9.15   RBC 3.19*   HGB 9.2*   HCT 26.6*   PLT 36*   MCV 83   MCH 28.8   MCHC 34.6     CMP:   Recent Labs   Lab 09/16/22  0507   GLU 87   CALCIUM 8.4*   ALBUMIN 3.4*   PROT 5.7*   *   K 3.9   CO2 22*   CL 97   BUN 46*   CREATININE 1.3   ALKPHOS 101   *   AST 80*   BILITOT 5.0*       Significant Diagnostics:  CXR: I have reviewed all pertinent results/findings within the past 24 hours    VTE Risk Mitigation (From admission, onward)           Ordered     IP VTE LOW RISK PATIENT  Once         09/03/22 2258     Place sequential compression device  Until discontinued         09/03/22 2258                  CXR (9/16) - Right-sided chest tube is again noted.  Heart size is normal, as is the appearance of the pulmonary vascularity.  Both lower lung zones demonstrate improved aeration since the examination referenced above, with no new areas of airspace consolidation or volume loss having developed.  No pleural fluid of any substantial volume is seen on either side.  No abnormal mediastinal widening.  No pneumothorax.      Assessment/Plan:     * Acute hypoxemic respiratory failure  Persistent hepatic hydrothorax, s/p bedside bleomycin pleurodesis on 9/14/22. Clinical improvement since that time, remains on RA without SOB. R CT remains to suction, 235 cc output/24 hrs.     - Keep R CT to suction with degree of output,  possible removal tomorrow if planning to discharge   - Daily CXR in place  - Ok to ambulate, place CT to WS while ambulating        Janee López MD  Thoracic Surgery  [facility] - Telemetry Stepdown

## 2022-09-16 NOTE — NURSING
Patient AAOx4.   Vitals stable. Tele-monitor is place. NSR.  CT in place, dressing changed.   -20 suction, output to be charted, per order.   No SOB or changes in resp status noted.   Lying in bed, comfortably.   Bed in low and locked position. Bed alarm on and audible. Call light within reach. Instructed to call for assistance.

## 2022-09-16 NOTE — ASSESSMENT & PLAN NOTE
RESOLVED, now on ROOM AIR  Patient presented with Hypoxic Respiratory failure which is Acute.  he is not on home oxygen. Supplemental oxygen was provided and noted-  .   Signs/symptoms of respiratory failure include- tachypnea and increased work of breathing. 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 Labs and images were reviewed. Patient Has not had a recent ABG.  Initial chest CT with bilateral patchy infiltrates consistent with Covid-19. Right plural effusion (suspect hepatohydrothorax) + pneumothorax (unclear if procedural complication) s/p chest tube placement at OSH. CT imaging concerning for chest tube in lung parenchyma vs lung re-expansion around tube. CTS evaluation with recs for stabilization prior to any intervention    - Finished remdes and dexamethasone  - Discontinued abx  - Output was improving from chest tube, but later worsened. Patient received pleurodesis to help reduce output and hopefully chest tube can be removed tomorrow.

## 2022-09-16 NOTE — SUBJECTIVE & OBJECTIVE
Interval History: No issues overnight. Remains on RA. R CT to suction with 235 cc output in 24 hrs, 100 cc overnight, no air leak.     Medications:  Continuous Infusions:  Scheduled Meds:   folic acid  1 mg Oral Daily    lactulose  20 g Oral TID    midodrine  10 mg Oral Q8H    pantoprazole  40 mg Oral Daily    rifAXImin  550 mg Oral BID    scopolamine  1 patch Transdermal Q3 Days    sodium chloride 0.9% 50 mL with bleomycin (BLEOCIN) 60 Units   Other Once    sulfamethoxazole-trimethoprim 800-160mg  1 tablet Oral Daily    thiamine  100 mg Oral Daily     PRN Meds:acetaminophen, aluminum & magnesium hydroxide-simethicone, methocarbamoL, ondansetron, oxyCODONE, oxyCODONE, sodium chloride 0.9%, traMADoL     Review of patient's allergies indicates:   Allergen Reactions    Ampicillin      Tolerated Zosyn 09/2022    Ceclor [cefaclor]      Tolerated Zosyn 09/2022     Objective:     Vital Signs (Most Recent):  Temp: 97.7 °F (36.5 °C) (09/16/22 0806)  Pulse: 63 (09/16/22 0806)  Resp: (!) 22 (09/16/22 0806)  BP: (!) 98/56 (09/16/22 0806)  SpO2: 100 % (09/16/22 0806)   Vital Signs (24h Range):  Temp:  [96.4 °F (35.8 °C)-98.7 °F (37.1 °C)] 97.7 °F (36.5 °C)  Pulse:  [56-92] 63  Resp:  [15-22] 22  SpO2:  [94 %-100 %] 100 %  BP: (81-99)/(47-56) 98/56     Intake/Output - Last 3 Shifts         09/14 0700  09/15 0659 09/15 0700 09/16 0659 09/16 0700 09/17 0659    P.O.       I.V. (mL/kg)  104.9 (1.4)     IV Piggyback  228.1     Total Intake(mL/kg)  333 (4.5)     Urine (mL/kg/hr)  0 (0) 275 (2.1)    Stool 0 0     Chest Tube 50 235     Total Output 50 235 275    Net -50 +98 -275           Urine Occurrence  5 x     Stool Occurrence 1 x 4 x             SpO2: 100 %  O2 Device (Oxygen Therapy): room air    Physical Exam  Vitals and nursing note reviewed.   Constitutional:       General: He is not in acute distress.  Cardiovascular:      Rate and Rhythm: Normal rate.      Pulses: Normal pulses.   Pulmonary:      Effort: Pulmonary effort is  normal. No respiratory distress.      Comments: R CT to suction; no air leak  Abdominal:      General: There is no distension.      Palpations: Abdomen is soft.      Tenderness: There is no abdominal tenderness.   Neurological:      Mental Status: He is alert.       Significant Labs:  BMP:   Recent Labs   Lab 09/16/22  0507   GLU 87   *   K 3.9   CL 97   CO2 22*   BUN 46*   CREATININE 1.3   CALCIUM 8.4*   MG 2.1     CBC:   Recent Labs   Lab 09/16/22  0507   WBC 9.15   RBC 3.19*   HGB 9.2*   HCT 26.6*   PLT 36*   MCV 83   MCH 28.8   MCHC 34.6     CMP:   Recent Labs   Lab 09/16/22  0507   GLU 87   CALCIUM 8.4*   ALBUMIN 3.4*   PROT 5.7*   *   K 3.9   CO2 22*   CL 97   BUN 46*   CREATININE 1.3   ALKPHOS 101   *   AST 80*   BILITOT 5.0*       Significant Diagnostics:  CXR: I have reviewed all pertinent results/findings within the past 24 hours    VTE Risk Mitigation (From admission, onward)           Ordered     IP VTE LOW RISK PATIENT  Once         09/03/22 2258     Place sequential compression device  Until discontinued         09/03/22 2258                  CXR (9/16) - Right-sided chest tube is again noted.  Heart size is normal, as is the appearance of the pulmonary vascularity.  Both lower lung zones demonstrate improved aeration since the examination referenced above, with no new areas of airspace consolidation or volume loss having developed.  No pleural fluid of any substantial volume is seen on either side.  No abnormal mediastinal widening.  No pneumothorax.

## 2022-09-16 NOTE — PLAN OF CARE
CM met with patient to discuss his discharge plan. Mr. Wilson is expected to discharge later this afternoon pending medical stability. He informed this CM that he will need transportation to his home. CM confirmed that his current address is [address] [place] [address] in [place] [phone]. PT/OT are recommending outpatient PT at this time. Will continue to follow.    Sneha Carrizales RN  Ext [phone]

## 2022-09-16 NOTE — PLAN OF CARE
[facility] - Telemetry Stepdown  Discharge Reassessment    Primary Care Provider: [facility]    Expected Discharge Date: 9/17/2022    Reassessment (most recent)       Discharge Reassessment - 09/16/22 1517          Discharge Reassessment    Assessment Type Discharge Planning Reassessment     Discharge Plan discussed with: Patient     Communicated ENIO with patient/caregiver Yes     Discharge Plan A Home with family     Discharge Plan B Home with family     DME Needed Upon Discharge  none     Discharge Barriers Identified None     Why the patient remains in the hospital Requires continued medical care        Post-Acute Status    Post-Acute Authorization Other     Other Status No Post-Acute Service Needs                   Sneha Carrizales, RN  Ext [phone]

## 2022-09-16 NOTE — ASSESSMENT & PLAN NOTE
Seen on 9/15. Patient with acute kidney injury likely multifactorial. Could be related to relative hypotension MAPs dropped by 10-15 overnight after pleurodesis. Could be related to overdiuresis coupled with emesis causing hypovolemia and pre-renal MAN. MAN is currently worsening. Labs reviewed- Renal function/electrolytes with Estimated Creatinine Clearance: 81.3 mL/min (based on SCr of 1.3 mg/dL). according to latest data. Monitor urine output and serial BMP and adjust therapy as needed. Avoid nephrotoxins and renally dose meds for GFR listed above.   - Urine sodium was low indicating hypovolemic hyponatremia from emesis. Which also indicates pre-renal cause of MAN. MAN improved with fluids/albumin/midodrine 9/15.  - Added midodrine given hypotension  - Discontinued lasix/spironolactone for now

## 2022-09-16 NOTE — ASSESSMENT & PLAN NOTE
Persistent hepatic hydrothorax, s/p bedside bleomycin pleurodesis on 9/14/22. Clinical improvement since that time, remains on RA without SOB. R CT remains to suction, 235 cc output/24 hrs.     - Keep R CT to suction  - Daily CXR in place  - Ok to ambulate, place CT to WS while ambulating, ok

## 2022-09-16 NOTE — PLAN OF CARE
Problem: Adult Inpatient Plan of Care  Goal: Plan of Care Review  Outcome: Ongoing, Progressing  Goal: Patient-Specific Goal (Individualized)  Outcome: Ongoing, Progressing  Goal: Absence of Hospital-Acquired Illness or Injury  Outcome: Ongoing, Progressing  Goal: Optimal Comfort and Wellbeing  Outcome: Ongoing, Progressing     Problem: Fall Injury Risk  Goal: Absence of Fall and Fall-Related Injury  Outcome: Ongoing, Progressing     Problem: Infection  Goal: Absence of Infection Signs and Symptoms  Outcome: Ongoing, Progressing     Problem: Skin Injury Risk Increased  Goal: Skin Health and Integrity  Outcome: Ongoing, Progressing     Problem: Fluid and Electrolyte Imbalance (Acute Kidney Injury/Impairment)  Goal: Fluid and Electrolyte Balance  Outcome: Ongoing, Progressing     Problem: Oral Intake Inadequate (Acute Kidney Injury/Impairment)  Goal: Optimal Nutrition Intake  Outcome: Ongoing, Progressing     Problem: Renal Function Impairment (Acute Kidney Injury/Impairment)  Goal: Effective Renal Function  Outcome: Ongoing, Progressing

## 2022-09-16 NOTE — ASSESSMENT & PLAN NOTE
MELD-Na score: 29 at 9/16/2022  5:07 AM  MELD score: 23 at 9/16/2022  5:07 AM  Calculated from:  Serum Creatinine: 1.3 mg/dL at 9/16/2022  5:07 AM  Serum Sodium: 126 mmol/L at 9/16/2022  5:07 AM  Total Bilirubin: 5.0 mg/dL at 9/16/2022  5:07 AM  INR(ratio): 2.1 at 9/16/2022  5:07 AM  Age: 37 years  Secondary to alcoholic cirrhosis. Not a transplant candidate given etoh use last admission but patient's PETH is <10 indicating continued abstinence which bodes well for patient. He needs to continue AA meetings on discharge. -holding home propranolol in setting of hypotension - can resume as clinically appropriate  - Stopped 40mg lasix and 100mg spironolactone given MAN  - Continuing bactrim for ppx while patient still has chest tube in place.  - Hepatology consulted

## 2022-09-16 NOTE — PROGRESS NOTES
[facility] - Telemetry [facility] Medicine  Progress Note    Patient Name: Porfirio Wilson  MRN: [id number]  Patient Class: IP- Inpatient   Admission Date: 9/3/2022  Length of Stay: 13 days  Attending Physician: Taryn Linares MD  Primary Care Provider: No primary care provider on file.        Subjective:     Principal Problem:Acute hypoxemic respiratory failure        HPI:  Patient is a 36 y.o. male with significant past medical history of ETOH cirrhosis (complicated by presence of esophageal varices s/p band ligation), bipolar d/o and pancreatitis presented to [facility] on 9/3 complaining of shortness of breath x2 hours with associated fever/chills, cough and diaphoresis. Patient was intubated in the ED due to degree of respiratory distress. Cxry showed right pleural effusion and pneumothorax and focal consolidation on the left. He was found to be Covid positive. Patient had a right side chest tube placed. Labs at OSH significant for leukocytosis of 16, procal 0.46, lactic 5, , INR 2.1, negative troponin, ETOH negative and ammonia 86.  Pt was tx w/ levaquin & flagyl, as well as given IVF & vit K for coagulopathy associated w/ liver dz. Pt is currently requiring levo for map > 65. Last reported ETOH drink ~ 6 weeks ago.      Patient has been transferred to [facility] for higher level of care and Hepatology services.       Overview/Hospital Course:  Since transfer, chest tube drainage has been monitored. It has actually gone up 9/13-9/14 and currently plans are for bedside pleurodesis with CTS. Bedside pleurodesis performed 9/14. Hypotension/pain overnight following pleurodesis. Patient following morning reported feeling much better. MAN improved with fluids on 9/15.      Interval History: No acute issues overnight. Patient feels well. CT to remain additional day given output from previous day.    Review of Systems   Constitutional:  Negative for chills and fever.   HENT:  Negative  for congestion and sore throat.    Eyes:  Negative for photophobia and visual disturbance.   Respiratory:  Negative for cough and shortness of breath.    Cardiovascular:  Negative for chest pain (around chest tube site) and palpitations.   Gastrointestinal:  Negative for abdominal pain, blood in stool, constipation, diarrhea, nausea and vomiting.   Endocrine: Negative for cold intolerance and heat intolerance.   Genitourinary:  Negative for dysuria and hematuria.   Musculoskeletal:  Negative for arthralgias and myalgias.   Skin:  Negative for rash and wound.   Allergic/Immunologic: Negative for environmental allergies and food allergies.   Neurological:  Negative for seizures and numbness.   Hematological:  Negative for adenopathy. Does not bruise/bleed easily.   Psychiatric/Behavioral:  Negative for hallucinations and suicidal ideas.    Objective:     Vital Signs (Most Recent):  Temp: 98.1 °F (36.7 °C) (09/16/22 1116)  Pulse: 71 (09/16/22 1116)  Resp: (!) 22 (09/16/22 1116)  BP: (!) 91/54 (09/16/22 1116)  SpO2: 99 % (09/16/22 1116)   Vital Signs (24h Range):  Temp:  [96.4 °F (35.8 °C)-98.7 °F (37.1 °C)] 98.1 °F (36.7 °C)  Pulse:  [56-92] 71  Resp:  [16-22] 22  SpO2:  [94 %-100 %] 99 %  BP: (89-99)/(50-56) 91/54     Weight: 73.2 kg (161 lb 6 oz)  Body mass index is 21.89 kg/m².    Intake/Output Summary (Last 24 hours) at 9/16/2022 1304  Last data filed at 9/16/2022 1121  Gross per 24 hour   Intake 532.95 ml   Output 510 ml   Net 22.95 ml        Physical Exam  Constitutional:       Appearance: He is well-developed. He is not ill-appearing or toxic-appearing.   HENT:      Head: Normocephalic and atraumatic.   Eyes:      Conjunctiva/sclera: Conjunctivae normal.      Pupils: Pupils are equal, round, and reactive to light.   Neck:      Thyroid: No thyromegaly.      Vascular: No JVD.   Cardiovascular:      Rate and Rhythm: Normal rate and regular rhythm.      Heart sounds: Normal heart sounds. No murmur heard.    No  friction rub. No gallop.   Pulmonary:      Effort: Pulmonary effort is normal. No respiratory distress.      Breath sounds: Normal breath sounds. No wheezing or rales.   Abdominal:      General: Bowel sounds are normal. There is no distension.      Palpations: Abdomen is soft. There is no mass.      Tenderness: There is no abdominal tenderness. There is no guarding or rebound.      Hernia: No hernia is present.   Musculoskeletal:         General: No deformity. Normal range of motion.      Cervical back: Normal range of motion and neck supple.   Skin:     General: Skin is warm and dry.   Neurological:      Mental Status: He is alert and oriented to person, place, and time.      Cranial Nerves: No cranial nerve deficit.   Psychiatric:         Behavior: Behavior normal.       Significant Labs: All pertinent labs within the past 24 hours have been reviewed.  CBC:   Recent Labs   Lab 09/15/22  0505 09/16/22  0507   WBC 21.40* 9.15   HGB 9.0* 9.2*   HCT 27.1* 26.6*   PLT 46* 36*       CMP:   Recent Labs   Lab 09/15/22  0505 09/15/22  1438 09/16/22  0507   * 129* 126*   K 4.4 4.2 3.9   CL 96 97 97   CO2 21* 25 22*   * 112* 87   BUN 40* 46* 46*   CREATININE 2.0* 2.0* 1.3   CALCIUM 8.6* 8.7 8.4*   PROT 5.7*  --  5.7*   ALBUMIN 3.5 3.7 3.4*   BILITOT 6.2*  --  5.0*   ALKPHOS 85  --  101   *  --  80*   *  --  104*   ANIONGAP 10 7* 7*       Coagulation:   Recent Labs   Lab 09/16/22  0507   INR 2.1*         Significant Imaging: I have reviewed all pertinent imaging results/findings within the past 24 hours.      Assessment/Plan:      * Acute hypoxemic respiratory failure  RESOLVED, now on ROOM AIR  Patient presented with Hypoxic Respiratory failure which is Acute.  he is not on home oxygen. Supplemental oxygen was provided and noted-  .   Signs/symptoms of respiratory failure include- tachypnea and increased work of breathing. 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 Labs and  images were reviewed. Patient Has not had a recent ABG.  Initial chest CT with bilateral patchy infiltrates consistent with Covid-19. Right plural effusion (suspect hepatohydrothorax) + pneumothorax (unclear if procedural complication) s/p chest tube placement at OSH. CT imaging concerning for chest tube in lung parenchyma vs lung re-expansion around tube. CTS evaluation with recs for stabilization prior to any intervention    - Finished remdes and dexamethasone  - Discontinued abx  - Output was improving from chest tube, but later worsened. Patient received pleurodesis to help reduce output and hopefully chest tube can be removed tomorrow.    MAN (acute kidney injury)  Seen on 9/15. Patient with acute kidney injury likely multifactorial. Could be related to relative hypotension MAPs dropped by 10-15 overnight after pleurodesis. Could be related to overdiuresis coupled with emesis causing hypovolemia and pre-renal MAN. MAN is currently worsening. Labs reviewed- Renal function/electrolytes with Estimated Creatinine Clearance: 81.3 mL/min (based on SCr of 1.3 mg/dL). according to latest data. Monitor urine output and serial BMP and adjust therapy as needed. Avoid nephrotoxins and renally dose meds for GFR listed above.   - Urine sodium was low indicating hypovolemic hyponatremia from emesis. Which also indicates pre-renal cause of MAN. MAN improved with fluids/albumin/midodrine 9/15.  - Added midodrine given hypotension  - Discontinued lasix/spironolactone for now      Seizure  Reported to have full tonic-clonic seizure like activity lasting ~ 1 min and resolved spontaneously at onset of transfer from OSH per EMS. None witnessed this hospitalization  - Head CT negative for acute intracranial abnormality       Decompensated hepatic cirrhosis  MELD-Na score: 29 at 9/16/2022  5:07 AM  MELD score: 23 at 9/16/2022  5:07 AM  Calculated from:  Serum Creatinine: 1.3 mg/dL at 9/16/2022  5:07 AM  Serum Sodium: 126 mmol/L at  9/16/2022  5:07 AM  Total Bilirubin: 5.0 mg/dL at 9/16/2022  5:07 AM  INR(ratio): 2.1 at 9/16/2022  5:07 AM  Age: 37 years  Secondary to alcoholic cirrhosis. Not a transplant candidate given etoh use last admission but patient's PETH is <10 indicating continued abstinence which bodes well for patient. He needs to continue AA meetings on discharge. -holding home propranolol in setting of hypotension - can resume as clinically appropriate  - Stopped 40mg lasix and 100mg spironolactone given MAN  - Continuing bactrim for ppx while patient still has chest tube in place.  - Hepatology consulted        VTE Risk Mitigation (From admission, onward)         Ordered     IP VTE LOW RISK PATIENT  Once         09/03/22 2258     Place sequential compression device  Until discontinued         09/03/22 2258                Discharge Planning   ENIO: 9/17/2022     Code Status: Full Code   Is the patient medically ready for discharge?: No    Reason for patient still in hospital (select all that apply): Patient trending condition  Discharge Plan A: Home with family   Discharge Delays: None known at this time              Taryn Linares MD  Department of Hospital Medicine   [facility] - Telemetry Stepdown

## 2022-09-17 LAB
ALBUMIN SERPL BCP-MCNC: 3.6 G/DL (ref 3.5–5.2)
ALP SERPL-CCNC: 104 U/L (ref 55–135)
ALT SERPL W/O P-5'-P-CCNC: 104 U/L (ref 10–44)
ANION GAP SERPL CALC-SCNC: 8 MMOL/L (ref 8–16)
AST SERPL-CCNC: 78 U/L (ref 10–40)
BASOPHILS # BLD AUTO: 0 K/UL (ref 0–0.2)
BASOPHILS NFR BLD: 0 % (ref 0–1.9)
BILIRUB SERPL-MCNC: 5.2 MG/DL (ref 0.1–1)
BUN SERPL-MCNC: 33 MG/DL (ref 6–20)
CALCIUM SERPL-MCNC: 8.9 MG/DL (ref 8.7–10.5)
CHLORIDE SERPL-SCNC: 97 MMOL/L (ref 95–110)
CO2 SERPL-SCNC: 22 MMOL/L (ref 23–29)
CREAT SERPL-MCNC: 1.2 MG/DL (ref 0.5–1.4)
DIFFERENTIAL METHOD: ABNORMAL
EOSINOPHIL # BLD AUTO: 0.4 K/UL (ref 0–0.5)
EOSINOPHIL NFR BLD: 4.4 % (ref 0–8)
ERYTHROCYTE [DISTWIDTH] IN BLOOD BY AUTOMATED COUNT: 18.5 % (ref 11.5–14.5)
EST. GFR  (NO RACE VARIABLE): >60 ML/MIN/1.73 M^2
GLUCOSE SERPL-MCNC: 77 MG/DL (ref 70–110)
HCT VFR BLD AUTO: 30.1 % (ref 40–54)
HGB BLD-MCNC: 10.1 G/DL (ref 14–18)
IMM GRANULOCYTES # BLD AUTO: 0.04 K/UL (ref 0–0.04)
IMM GRANULOCYTES NFR BLD AUTO: 0.5 % (ref 0–0.5)
INR PPP: 1.6 (ref 0.8–1.2)
LYMPHOCYTES # BLD AUTO: 0.5 K/UL (ref 1–4.8)
LYMPHOCYTES NFR BLD: 6.3 % (ref 18–48)
MAGNESIUM SERPL-MCNC: 1.9 MG/DL (ref 1.6–2.6)
MCH RBC QN AUTO: 29.4 PG (ref 27–31)
MCHC RBC AUTO-ENTMCNC: 33.6 G/DL (ref 32–36)
MCV RBC AUTO: 88 FL (ref 82–98)
MONOCYTES # BLD AUTO: 1.8 K/UL (ref 0.3–1)
MONOCYTES NFR BLD: 22 % (ref 4–15)
NEUTROPHILS # BLD AUTO: 5.3 K/UL (ref 1.8–7.7)
NEUTROPHILS NFR BLD: 66.8 % (ref 38–73)
NRBC BLD-RTO: 0 /100 WBC
PHOSPHATE SERPL-MCNC: 2.9 MG/DL (ref 2.7–4.5)
PLATELET # BLD AUTO: 48 K/UL (ref 150–450)
PMV BLD AUTO: ABNORMAL FL (ref 9.2–12.9)
POTASSIUM SERPL-SCNC: 3.9 MMOL/L (ref 3.5–5.1)
PROT SERPL-MCNC: 6.2 G/DL (ref 6–8.4)
PROTHROMBIN TIME: 16.1 SEC (ref 9–12.5)
RBC # BLD AUTO: 3.43 M/UL (ref 4.6–6.2)
SODIUM SERPL-SCNC: 127 MMOL/L (ref 136–145)
WBC # BLD AUTO: 7.99 K/UL (ref 3.9–12.7)

## 2022-09-17 PROCEDURE — 25000003 PHARM REV CODE 250: Performed by: HOSPITALIST

## 2022-09-17 PROCEDURE — 25000003 PHARM REV CODE 250

## 2022-09-17 PROCEDURE — 99232 SBSQ HOSP IP/OBS MODERATE 35: CPT | Mod: ,,, | Performed by: STUDENT IN AN ORGANIZED HEALTH CARE EDUCATION/TRAINING PROGRAM

## 2022-09-17 PROCEDURE — 25000003 PHARM REV CODE 250: Performed by: STUDENT IN AN ORGANIZED HEALTH CARE EDUCATION/TRAINING PROGRAM

## 2022-09-17 PROCEDURE — 36415 COLL VENOUS BLD VENIPUNCTURE: CPT | Performed by: NURSE PRACTITIONER

## 2022-09-17 PROCEDURE — 85025 COMPLETE CBC W/AUTO DIFF WBC: CPT | Performed by: NURSE PRACTITIONER

## 2022-09-17 PROCEDURE — 85610 PROTHROMBIN TIME: CPT | Performed by: NURSE PRACTITIONER

## 2022-09-17 PROCEDURE — 84100 ASSAY OF PHOSPHORUS: CPT | Performed by: NURSE PRACTITIONER

## 2022-09-17 PROCEDURE — 27000207 HC ISOLATION

## 2022-09-17 PROCEDURE — 83735 ASSAY OF MAGNESIUM: CPT | Performed by: NURSE PRACTITIONER

## 2022-09-17 PROCEDURE — 99232 PR SUBSEQUENT HOSPITAL CARE,LEVL II: ICD-10-PCS | Mod: ,,, | Performed by: STUDENT IN AN ORGANIZED HEALTH CARE EDUCATION/TRAINING PROGRAM

## 2022-09-17 PROCEDURE — 80053 COMPREHEN METABOLIC PANEL: CPT | Performed by: NURSE PRACTITIONER

## 2022-09-17 PROCEDURE — 20600001 HC STEP DOWN PRIVATE ROOM

## 2022-09-17 RX ADMIN — PANTOPRAZOLE SODIUM 40 MG: 40 TABLET, DELAYED RELEASE ORAL at 08:09

## 2022-09-17 RX ADMIN — RIFAXIMIN 550 MG: 550 TABLET ORAL at 08:09

## 2022-09-17 RX ADMIN — LACTULOSE 20 G: 20 SOLUTION ORAL at 08:09

## 2022-09-17 RX ADMIN — TRAMADOL HYDROCHLORIDE 50 MG: 50 TABLET, COATED ORAL at 05:09

## 2022-09-17 RX ADMIN — FOLIC ACID 1 MG: 1 TABLET ORAL at 08:09

## 2022-09-17 RX ADMIN — OXYCODONE 5 MG: 5 TABLET ORAL at 08:09

## 2022-09-17 RX ADMIN — Medication 100 MG: at 08:09

## 2022-09-17 RX ADMIN — LACTULOSE 20 G: 20 SOLUTION ORAL at 03:09

## 2022-09-17 RX ADMIN — MIDODRINE HYDROCHLORIDE 10 MG: 5 TABLET ORAL at 05:09

## 2022-09-17 RX ADMIN — SULFAMETHOXAZOLE AND TRIMETHOPRIM 1 TABLET: 800; 160 TABLET ORAL at 08:09

## 2022-09-17 NOTE — ASSESSMENT & PLAN NOTE
RESOLVED, now on ROOM AIR  Patient presented with Hypoxic Respiratory failure which is Acute.  he is not on home oxygen. Supplemental oxygen was provided and noted-  .   Signs/symptoms of respiratory failure include- tachypnea and increased work of breathing. 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 Labs and images were reviewed. Patient Has not had a recent ABG.  Initial chest CT with bilateral patchy infiltrates consistent with Covid-19. Right plural effusion (suspect hepatohydrothorax) + pneumothorax (unclear if procedural complication) s/p chest tube placement at OSH. CT imaging concerning for chest tube in lung parenchyma vs lung re-expansion around tube. CTS evaluation with recs for stabilization prior to any intervention    - Finished remdes and dexamethasone  - Discontinued abx  - Output was improving from chest tube, but later worsened. Patient received pleurodesis to help reduce output and hopefully chest tube can be removed tomorrow. Placed to [facility] today

## 2022-09-17 NOTE — SUBJECTIVE & OBJECTIVE
Interval History: No issues overnight, 10 cc overnight. On RA. CT with no air leak    Medications:  Continuous Infusions:  Scheduled Meds:   folic acid  1 mg Oral Daily    lactulose  20 g Oral TID    midodrine  10 mg Oral Q8H    pantoprazole  40 mg Oral Daily    rifAXImin  550 mg Oral BID    sodium chloride 0.9% 50 mL with bleomycin (BLEOCIN) 60 Units   Other Once    sulfamethoxazole-trimethoprim 800-160mg  1 tablet Oral Daily    thiamine  100 mg Oral Daily     PRN Meds:acetaminophen, aluminum & magnesium hydroxide-simethicone, methocarbamoL, ondansetron, oxyCODONE, oxyCODONE, sodium chloride 0.9%, traMADoL     Review of patient's allergies indicates:   Allergen Reactions    Ampicillin      Tolerated Zosyn 09/2022    Ceclor [cefaclor]      Tolerated Zosyn 09/2022     Objective:     Vital Signs (Most Recent):  Temp: 98.3 °F (36.8 °C) (09/17/22 0739)  Pulse: 68 (09/17/22 0739)  Resp: 18 (09/17/22 0739)  BP: (!) 103/54 (09/17/22 0739)  SpO2: 99 % (09/17/22 0739)   Vital Signs (24h Range):  Temp:  [98 °F (36.7 °C)-98.3 °F (36.8 °C)] 98.3 °F (36.8 °C)  Pulse:  [62-71] 68  Resp:  [16-25] 18  SpO2:  [97 %-99 %] 99 %  BP: ()/(54-59) 103/54     Weight: 73.2 kg (161 lb 6 oz)  Body mass index is 21.89 kg/m².    Intake/Output - Last 3 Shifts         09/15 0700  09/16 0659 09/16 0700  09/17 0659 09/17 0700 09/18 0659    P.O.  380     I.V. (mL/kg) 104.9 (1.4)      IV Piggyback 228.1      Total Intake(mL/kg) 333 (4.5) 380 (5.2)     Urine (mL/kg/hr) 0 (0) 850 (0.5)     Stool 0      Chest Tube 235 280     Total Output 235 1130     Net +98 -750            Urine Occurrence 5 x      Stool Occurrence 4 x              Physical Exam  Vitals and nursing note reviewed.   Cardiovascular:      Rate and Rhythm: Normal rate.      Pulses: Normal pulses.   Pulmonary:      Effort: Pulmonary effort is normal. No respiratory distress.      Comments: R CT to suction, serous output, 10 cc overnight. No air leak  Abdominal:      General: There  is no distension.      Palpations: Abdomen is soft.      Tenderness: There is no abdominal tenderness.       Significant Labs:  I have reviewed all pertinent lab results within the past 24 hours.  CBC:   Recent Labs   Lab 09/17/22  0539   WBC 7.99   RBC 3.43*   HGB 10.1*   HCT 30.1*   PLT 48*   MCV 88   MCH 29.4   MCHC 33.6     BMP:   Recent Labs   Lab 09/17/22  0539   GLU 77   *   K 3.9   CL 97   CO2 22*   BUN 33*   CREATININE 1.2   CALCIUM 8.9   MG 1.9       Significant Diagnostics:  I have reviewed all pertinent imaging results/findings within the past 24 hours.

## 2022-09-17 NOTE — ASSESSMENT & PLAN NOTE
Seen on 9/15. Patient with acute kidney injury likely multifactorial. Could be related to relative hypotension MAPs dropped by 10-15 overnight after pleurodesis. Could be related to overdiuresis coupled with emesis causing hypovolemia and pre-renal MAN. MAN is currently worsening. Labs reviewed- Renal function/electrolytes with Estimated Creatinine Clearance: 88.1 mL/min (based on SCr of 1.2 mg/dL). according to latest data. Monitor urine output and serial BMP and adjust therapy as needed. Avoid nephrotoxins and renally dose meds for GFR listed above.   - Urine sodium was low indicating hypovolemic hyponatremia from emesis. Which also indicates pre-renal cause of MAN. MAN improved with fluids/albumin/midodrine 9/15. Discontinued midodrine 9/17 given improvement in renal function and BP  - Continue holding lasix/spironolactone for now

## 2022-09-17 NOTE — ASSESSMENT & PLAN NOTE
Persistent hepatic hydrothorax, s/p bedside bleomycin pleurodesis on 9/14/22. Clinical improvement since that time, remains on RA without SOB. R CT remains to suction, 280 cc output/24 hrs. Due to persistently high output, would recommend continued drainage.     - R CT to WS  - Would allow continued drainage with this amount of output at this time  - Daily CXR in place  - Ok to ambulate, OOB

## 2022-09-17 NOTE — SUBJECTIVE & OBJECTIVE
Interval History: Continued output from chest tube. Patient mood more depressed today given prolonged hospitalization and isolation from family who live in [place], [place]. He reports that he is having an overall 'bad day' and that he understands why he's still being hospitalized.    Review of Systems   Constitutional:  Negative for chills and fever.   HENT:  Negative for congestion and sore throat.    Eyes:  Negative for photophobia and visual disturbance.   Respiratory:  Negative for cough and shortness of breath.    Cardiovascular:  Negative for chest pain (around chest tube site) and palpitations.   Gastrointestinal:  Negative for abdominal pain, blood in stool, constipation, diarrhea, nausea and vomiting.   Endocrine: Negative for cold intolerance and heat intolerance.   Genitourinary:  Negative for dysuria and hematuria.   Musculoskeletal:  Negative for arthralgias and myalgias.   Skin:  Negative for rash and wound.   Allergic/Immunologic: Negative for environmental allergies and food allergies.   Neurological:  Negative for seizures and numbness.   Hematological:  Negative for adenopathy. Does not bruise/bleed easily.   Psychiatric/Behavioral:  Negative for hallucinations and suicidal ideas.    Objective:     Vital Signs (Most Recent):  Temp: 98.3 °F (36.8 °C) (09/17/22 0739)  Pulse: 68 (09/17/22 0739)  Resp: 18 (09/17/22 0739)  BP: (!) 103/54 (09/17/22 0739)  SpO2: 99 % (09/17/22 0739)   Vital Signs (24h Range):  Temp:  [98 °F (36.7 °C)-98.3 °F (36.8 °C)] 98.3 °F (36.8 °C)  Pulse:  [62-71] 68  Resp:  [16-25] 18  SpO2:  [97 %-99 %] 99 %  BP: ()/(54-59) 103/54     Weight: 73.2 kg (161 lb 6 oz)  Body mass index is 21.89 kg/m².    Intake/Output Summary (Last 24 hours) at 9/17/2022 1100  Last data filed at 9/17/2022 0500  Gross per 24 hour   Intake 380 ml   Output 855 ml   Net -475 ml        Physical Exam  Constitutional:       Appearance: He is well-developed. He is not ill-appearing or toxic-appearing.    HENT:      Head: Normocephalic and atraumatic.   Eyes:      Conjunctiva/sclera: Conjunctivae normal.      Pupils: Pupils are equal, round, and reactive to light.   Neck:      Thyroid: No thyromegaly.      Vascular: No JVD.   Cardiovascular:      Rate and Rhythm: Normal rate and regular rhythm.      Heart sounds: Normal heart sounds. No murmur heard.    No friction rub. No gallop.   Pulmonary:      Effort: Pulmonary effort is normal. No respiratory distress.      Breath sounds: Normal breath sounds. No wheezing or rales.   Abdominal:      General: Bowel sounds are normal. There is no distension.      Palpations: Abdomen is soft. There is no mass.      Tenderness: There is no abdominal tenderness. There is no guarding or rebound.      Hernia: No hernia is present.   Musculoskeletal:         General: No deformity. Normal range of motion.      Cervical back: Normal range of motion and neck supple.   Skin:     General: Skin is warm and dry.   Neurological:      Mental Status: He is alert and oriented to person, place, and time.      Cranial Nerves: No cranial nerve deficit.   Psychiatric:         Behavior: Behavior normal.       Significant Labs: All pertinent labs within the past 24 hours have been reviewed.  CBC:   Recent Labs   Lab 09/16/22  0507 09/17/22  0539   WBC 9.15 7.99   HGB 9.2* 10.1*   HCT 26.6* 30.1*   PLT 36* 48*       CMP:   Recent Labs   Lab 09/15/22  1438 09/16/22  0507 09/17/22  0539   * 126* 127*   K 4.2 3.9 3.9   CL 97 97 97   CO2 25 22* 22*   * 87 77   BUN 46* 46* 33*   CREATININE 2.0* 1.3 1.2   CALCIUM 8.7 8.4* 8.9   PROT  --  5.7* 6.2   ALBUMIN 3.7 3.4* 3.6   BILITOT  --  5.0* 5.2*   ALKPHOS  --  101 104   AST  --  80* 78*   ALT  --  104* 104*   ANIONGAP 7* 7* 8       Coagulation:   Recent Labs   Lab 09/17/22  0539   INR 1.6*         Significant Imaging: I have reviewed all pertinent imaging results/findings within the past 24 hours.

## 2022-09-17 NOTE — PROGRESS NOTES
[facility] - Tele[facility] Medicine  Progress Note    Patient Name: Porfirio Wilson  MRN: [id number]  Patient Class: IP- Inpatient   Admission Date: 9/3/2022  Length of Stay: 14 days  Attending Physician: Taryn Linares MD  Primary Care Provider: [place]        Subjective:     Principal Problem:Acute hypoxemic respiratory failure        HPI:  Patient is a 36 y.o. male with significant past medical history of ETOH cirrhosis (complicated by presence of esophageal varices s/p band ligation), bipolar d/o and pancreatitis presented to [place] on 9/3 complaining of shortness of breath x2 hours with associated fever/chills, cough and diaphoresis. Patient was intubated in the ED due to degree of respiratory distress. Cxry showed right pleural effusion and pneumothorax and focal consolidation on the left. He was found to be Covid positive. Patient had a right side chest tube placed. Labs at OSH significant for leukocytosis of 16, procal 0.46, lactic 5, , INR 2.1, negative troponin, ETOH negative and ammonia 86.  Pt was tx w/ levaquin & flagyl, as well as given IVF & vit K for coagulopathy associated w/ liver dz. Pt is currently requiring levo for map > 65. Last reported ETOH drink ~ 6 weeks ago.      Patient has been transferred to [facility] for higher level of care and Hepatology services.       Overview/Hospital Course:  Since transfer, chest tube drainage has been monitored. It has actually gone up 9/13-9/14 and currently plans are for bedside pleurodesis with CTS. Bedside pleurodesis performed 9/14. Hypotension/pain overnight following pleurodesis. Patient following morning reported feeling much better. MAN improved with fluids on 9/15. Patient with continued chest tube output, surgery placed CT to water seal on 9/17.      Interval History: Continued output from chest tube. Patient mood more depressed today given prolonged hospitalization and isolation  from family who live in [place], [place]. He reports that he is having an overall 'bad day' and that he understands why he's still being hospitalized.    Review of Systems   Constitutional:  Negative for chills and fever.   HENT:  Negative for congestion and sore throat.    Eyes:  Negative for photophobia and visual disturbance.   Respiratory:  Negative for cough and shortness of breath.    Cardiovascular:  Negative for chest pain (around chest tube site) and palpitations.   Gastrointestinal:  Negative for abdominal pain, blood in stool, constipation, diarrhea, nausea and vomiting.   Endocrine: Negative for cold intolerance and heat intolerance.   Genitourinary:  Negative for dysuria and hematuria.   Musculoskeletal:  Negative for arthralgias and myalgias.   Skin:  Negative for rash and wound.   Allergic/Immunologic: Negative for environmental allergies and food allergies.   Neurological:  Negative for seizures and numbness.   Hematological:  Negative for adenopathy. Does not bruise/bleed easily.   Psychiatric/Behavioral:  Negative for hallucinations and suicidal ideas.    Objective:     Vital Signs (Most Recent):  Temp: 98.3 °F (36.8 °C) (09/17/22 0739)  Pulse: 68 (09/17/22 0739)  Resp: 18 (09/17/22 0739)  BP: (!) 103/54 (09/17/22 0739)  SpO2: 99 % (09/17/22 0739)   Vital Signs (24h Range):  Temp:  [98 °F (36.7 °C)-98.3 °F (36.8 °C)] 98.3 °F (36.8 °C)  Pulse:  [62-71] 68  Resp:  [16-25] 18  SpO2:  [97 %-99 %] 99 %  BP: ()/(54-59) 103/54     Weight: 73.2 kg (161 lb 6 oz)  Body mass index is 21.89 kg/m².    Intake/Output Summary (Last 24 hours) at 9/17/2022 1100  Last data filed at 9/17/2022 0500  Gross per 24 hour   Intake 380 ml   Output 855 ml   Net -475 ml        Physical Exam  Constitutional:       Appearance: He is well-developed. He is not ill-appearing or toxic-appearing.   HENT:      Head: Normocephalic and atraumatic.   Eyes:      Conjunctiva/sclera: Conjunctivae normal.      Pupils: Pupils are equal,  round, and reactive to light.   Neck:      Thyroid: No thyromegaly.      Vascular: No JVD.   Cardiovascular:      Rate and Rhythm: Normal rate and regular rhythm.      Heart sounds: Normal heart sounds. No murmur heard.    No friction rub. No gallop.   Pulmonary:      Effort: Pulmonary effort is normal. No respiratory distress.      Breath sounds: Normal breath sounds. No wheezing or rales.   Abdominal:      General: Bowel sounds are normal. There is no distension.      Palpations: Abdomen is soft. There is no mass.      Tenderness: There is no abdominal tenderness. There is no guarding or rebound.      Hernia: No hernia is present.   Musculoskeletal:         General: No deformity. Normal range of motion.      Cervical back: Normal range of motion and neck supple.   Skin:     General: Skin is warm and dry.   Neurological:      Mental Status: He is alert and oriented to person, place, and time.      Cranial Nerves: No cranial nerve deficit.   Psychiatric:         Behavior: Behavior normal.       Significant Labs: All pertinent labs within the past 24 hours have been reviewed.  CBC:   Recent Labs   Lab 09/16/22  0507 09/17/22  0539   WBC 9.15 7.99   HGB 9.2* 10.1*   HCT 26.6* 30.1*   PLT 36* 48*       CMP:   Recent Labs   Lab 09/15/22  1438 09/16/22  0507 09/17/22  0539   * 126* 127*   K 4.2 3.9 3.9   CL 97 97 97   CO2 25 22* 22*   * 87 77   BUN 46* 46* 33*   CREATININE 2.0* 1.3 1.2   CALCIUM 8.7 8.4* 8.9   PROT  --  5.7* 6.2   ALBUMIN 3.7 3.4* 3.6   BILITOT  --  5.0* 5.2*   ALKPHOS  --  101 104   AST  --  80* 78*   ALT  --  104* 104*   ANIONGAP 7* 7* 8       Coagulation:   Recent Labs   Lab 09/17/22  0539   INR 1.6*         Significant Imaging: I have reviewed all pertinent imaging results/findings within the past 24 hours.      Assessment/Plan:      * Acute hypoxemic respiratory failure  RESOLVED, now on ROOM AIR  Patient presented with Hypoxic Respiratory failure which is Acute.  he is not on home  oxygen. Supplemental oxygen was provided and noted-  .   Signs/symptoms of respiratory failure include- tachypnea and increased work of breathing. 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 Labs and images were reviewed. Patient Has not had a recent ABG.  Initial chest CT with bilateral patchy infiltrates consistent with Covid-19. Right plural effusion (suspect hepatohydrothorax) + pneumothorax (unclear if procedural complication) s/p chest tube placement at OSH. CT imaging concerning for chest tube in lung parenchyma vs lung re-expansion around tube. CTS evaluation with recs for stabilization prior to any intervention    - Finished remdes and dexamethasone  - Discontinued abx  - Output was improving from chest tube, but later worsened. Patient received pleurodesis to help reduce output and hopefully chest tube can be removed tomorrow. Placed to waterseal today    MAN (acute kidney injury)  Seen on 9/15. Patient with acute kidney injury likely multifactorial. Could be related to relative hypotension MAPs dropped by 10-15 overnight after pleurodesis. Could be related to overdiuresis coupled with emesis causing hypovolemia and pre-renal MAN. MAN is currently worsening. Labs reviewed- Renal function/electrolytes with Estimated Creatinine Clearance: 88.1 mL/min (based on SCr of 1.2 mg/dL). according to latest data. Monitor urine output and serial BMP and adjust therapy as needed. Avoid nephrotoxins and renally dose meds for GFR listed above.   - Urine sodium was low indicating hypovolemic hyponatremia from emesis. Which also indicates pre-renal cause of MAN. MAN improved with fluids/albumin/midodrine 9/15. Discontinued midodrine 9/17 given improvement in renal function and BP  - Continue holding lasix/spironolactone for now      Seizure  Reported to have full tonic-clonic seizure like activity lasting ~ 1 min and resolved spontaneously at onset of transfer from OSH per EMS. None witnessed this  hospitalization  - Head CT negative for acute intracranial abnormality       Decompensated hepatic cirrhosis  MELD-Na score: 29 at 9/16/2022  5:07 AM  MELD score: 23 at 9/16/2022  5:07 AM  Calculated from:  Serum Creatinine: 1.3 mg/dL at 9/16/2022  5:07 AM  Serum Sodium: 126 mmol/L at 9/16/2022  5:07 AM  Total Bilirubin: 5.0 mg/dL at 9/16/2022  5:07 AM  INR(ratio): 2.1 at 9/16/2022  5:07 AM  Age: 37 years  Secondary to alcoholic cirrhosis. Not a transplant candidate given etoh use last admission but patient's PETH is <10 indicating continued abstinence which bodes well for patient. He needs to continue AA meetings on discharge. -holding home propranolol in setting of hypotension - can resume as clinically appropriate  - Stopped 40mg lasix and 100mg spironolactone given MAN  - Continuing bactrim for ppx while patient still has chest tube in place.  - Hepatology consulted        VTE Risk Mitigation (From admission, onward)         Ordered     IP VTE LOW RISK PATIENT  Once         09/03/22 2258     Place sequential compression device  Until discontinued         09/03/22 2258                Discharge Planning   ENIO: 9/18/2022     Code Status: Full Code   Is the patient medically ready for discharge?: No    Reason for patient still in hospital (select all that apply): Patient trending condition  Discharge Plan A: Home with family   Discharge Delays: None known at this time              Taryn Linares MD  Department of Hospital Medicine   [facility] - Telemetry Stepdown

## 2022-09-17 NOTE — PROGRESS NOTES
[facility] - Telemetry Stepdown  Thoracic Surgery  Progress Note    Subjective:       Post-Op Info:  * No surgery found *         Interval History: No issues overnight. CT on suction, no air leak, 180 cc clear output overnight (280 cc/24 hr). On RA    Medications:  Continuous Infusions:  Scheduled Meds:   folic acid  1 mg Oral Daily    lactulose  20 g Oral TID    midodrine  10 mg Oral Q8H    pantoprazole  40 mg Oral Daily    rifAXImin  550 mg Oral BID    sodium chloride 0.9% 50 mL with bleomycin (BLEOCIN) 60 Units   Other Once    sulfamethoxazole-trimethoprim 800-160mg  1 tablet Oral Daily    thiamine  100 mg Oral Daily     PRN Meds:acetaminophen, aluminum & magnesium hydroxide-simethicone, methocarbamoL, ondansetron, oxyCODONE, oxyCODONE, sodium chloride 0.9%, traMADoL     Review of patient's allergies indicates:   Allergen Reactions    Ampicillin      Tolerated Zosyn 09/2022    Ceclor [cefaclor]      Tolerated Zosyn 09/2022     Objective:     Vital Signs (Most Recent):  Temp: 98.3 °F (36.8 °C) (09/17/22 0739)  Pulse: 68 (09/17/22 0739)  Resp: 18 (09/17/22 0739)  BP: (!) 103/54 (09/17/22 0739)  SpO2: 99 % (09/17/22 0739)   Vital Signs (24h Range):  Temp:  [98 °F (36.7 °C)-98.3 °F (36.8 °C)] 98.3 °F (36.8 °C)  Pulse:  [62-71] 68  Resp:  [16-25] 18  SpO2:  [97 %-99 %] 99 %  BP: ()/(54-59) 103/54     Intake/Output - Last 3 Shifts         09/15 0700  09/16 0659 09/16 0700 09/17 0659 09/17 0700 09/18 0659    P.O.  380     I.V. (mL/kg) 104.9 (1.4)      IV Piggyback 228.1      Total Intake(mL/kg) 333 (4.5) 380 (5.2)     Urine (mL/kg/hr) 0 (0) 850 (0.5)     Stool 0      Chest Tube 235 280     Total Output 235 1130     Net +98 -750            Urine Occurrence 5 x      Stool Occurrence 4 x              SpO2: 99 %  O2 Device (Oxygen Therapy): room air    Physical Exam  Vitals and nursing note reviewed.   Constitutional:       General: He is not in acute distress.  Cardiovascular:      Rate and Rhythm: Normal  rate.      Pulses: Normal pulses.   Pulmonary:      Effort: Pulmonary effort is normal. No respiratory distress.      Comments: R CT to suction, clear output, no air leak   Abdominal:      General: There is no distension.      Palpations: Abdomen is soft.      Tenderness: There is no abdominal tenderness.   Neurological:      Mental Status: He is alert.       Significant Labs:  BMP:   Recent Labs   Lab 09/17/22  0539   GLU 77   *   K 3.9   CL 97   CO2 22*   BUN 33*   CREATININE 1.2   CALCIUM 8.9   MG 1.9     CBC:   Recent Labs   Lab 09/17/22  0539   WBC 7.99   RBC 3.43*   HGB 10.1*   HCT 30.1*   PLT 48*   MCV 88   MCH 29.4   MCHC 33.6     CMP:   Recent Labs   Lab 09/17/22  0539   GLU 77   CALCIUM 8.9   ALBUMIN 3.6   PROT 6.2   *   K 3.9   CO2 22*   CL 97   BUN 33*   CREATININE 1.2   ALKPHOS 104   *   AST 78*   BILITOT 5.2*       Significant Diagnostics:  CXR: I have reviewed all pertinent results/findings within the past 24 hours    VTE Risk Mitigation (From admission, onward)           Ordered     IP VTE LOW RISK PATIENT  Once         09/03/22 2258     Place sequential compression device  Until discontinued         09/03/22 2258                  Assessment/Plan:     * Acute hypoxemic respiratory failure  Persistent hepatic hydrothorax, s/p bedside bleomycin pleurodesis on 9/14/22. Clinical improvement since that time, remains on RA without SOB. R CT remains to suction, 280 cc output/24 hrs. Due to persistently high output, would recommend continued drainage.     - R CT to WS  - Would allow continued drainage with this amount of output at this time  - Daily CXR in place  - Ok to ambulate, FLASH López MD  Thoracic Surgery  [facility] - Telemetry Stepdown

## 2022-09-17 NOTE — PLAN OF CARE
[facility] - Telemetry Stepdown  Discharge Reassessment    Primary Care Provider: [facility]    Expected Discharge Date: 9/18/2022    Reassessment (most recent)       Discharge Reassessment - 09/17/22 1384          Discharge Reassessment    Assessment Type Discharge Planning Reassessment     Did the patient's condition or plan change since previous assessment? Yes     Discharge Plan discussed with: --   tx team    Name(s) and Number(s) Dr. Linares (P)      Communicated ENIO with patient/caregiver Yes (P)      Discharge Plan A Home with family (P)      Discharge Plan B Home (P)      DME Needed Upon Discharge  none (P)      Discharge Barriers Identified None (P)      Why the patient remains in the hospital Requires continued medical care (P)         Post-Acute Status    Post-Acute Authorization Other (P)      Other Status No Post-Acute Service Needs (P)      Discharge Delays -- (P)    needs con't care                    Will need EMS transport once medically ready.    Brooklyn Helm LMSW  Case Management Social Worker   [facility] [address]

## 2022-09-17 NOTE — SUBJECTIVE & OBJECTIVE
Interval History: No issues overnight. CT on suction, no air leak, 180 cc clear output overnight (280 cc/24 hr). On RA    Medications:  Continuous Infusions:  Scheduled Meds:   folic acid  1 mg Oral Daily    lactulose  20 g Oral TID    midodrine  10 mg Oral Q8H    pantoprazole  40 mg Oral Daily    rifAXImin  550 mg Oral BID    sodium chloride 0.9% 50 mL with bleomycin (BLEOCIN) 60 Units   Other Once    sulfamethoxazole-trimethoprim 800-160mg  1 tablet Oral Daily    thiamine  100 mg Oral Daily     PRN Meds:acetaminophen, aluminum & magnesium hydroxide-simethicone, methocarbamoL, ondansetron, oxyCODONE, oxyCODONE, sodium chloride 0.9%, traMADoL     Review of patient's allergies indicates:   Allergen Reactions    Ampicillin      Tolerated Zosyn 09/2022    Ceclor [cefaclor]      Tolerated Zosyn 09/2022     Objective:     Vital Signs (Most Recent):  Temp: 98.3 °F (36.8 °C) (09/17/22 0739)  Pulse: 68 (09/17/22 0739)  Resp: 18 (09/17/22 0739)  BP: (!) 103/54 (09/17/22 0739)  SpO2: 99 % (09/17/22 0739)   Vital Signs (24h Range):  Temp:  [98 °F (36.7 °C)-98.3 °F (36.8 °C)] 98.3 °F (36.8 °C)  Pulse:  [62-71] 68  Resp:  [16-25] 18  SpO2:  [97 %-99 %] 99 %  BP: ()/(54-59) 103/54     Intake/Output - Last 3 Shifts         09/15 0700 09/16 0659 09/16 0700 09/17 0659 09/17 0700 09/18 0659    P.O.  380     I.V. (mL/kg) 104.9 (1.4)      IV Piggyback 228.1      Total Intake(mL/kg) 333 (4.5) 380 (5.2)     Urine (mL/kg/hr) 0 (0) 850 (0.5)     Stool 0      Chest Tube 235 280     Total Output 235 1130     Net +98 -750            Urine Occurrence 5 x      Stool Occurrence 4 x              SpO2: 99 %  O2 Device (Oxygen Therapy): room air    Physical Exam  Vitals and nursing note reviewed.   Constitutional:       General: He is not in acute distress.  Cardiovascular:      Rate and Rhythm: Normal rate.      Pulses: Normal pulses.   Pulmonary:      Effort: Pulmonary effort is normal. No respiratory distress.      Comments: R CT to  suction, clear output, no air leak   Abdominal:      General: There is no distension.      Palpations: Abdomen is soft.      Tenderness: There is no abdominal tenderness.   Neurological:      Mental Status: He is alert.       Significant Labs:  BMP:   Recent Labs   Lab 09/17/22  0539   GLU 77   *   K 3.9   CL 97   CO2 22*   BUN 33*   CREATININE 1.2   CALCIUM 8.9   MG 1.9     CBC:   Recent Labs   Lab 09/17/22  0539   WBC 7.99   RBC 3.43*   HGB 10.1*   HCT 30.1*   PLT 48*   MCV 88   MCH 29.4   MCHC 33.6     CMP:   Recent Labs   Lab 09/17/22  0539   GLU 77   CALCIUM 8.9   ALBUMIN 3.6   PROT 6.2   *   K 3.9   CO2 22*   CL 97   BUN 33*   CREATININE 1.2   ALKPHOS 104   *   AST 78*   BILITOT 5.2*       Significant Diagnostics:  CXR: I have reviewed all pertinent results/findings within the past 24 hours    VTE Risk Mitigation (From admission, onward)           Ordered     IP VTE LOW RISK PATIENT  Once         09/03/22 2258     Place sequential compression device  Until discontinued         09/03/22 2258

## 2022-09-18 VITALS
HEART RATE: 82 BPM | SYSTOLIC BLOOD PRESSURE: 108 MMHG | OXYGEN SATURATION: 98 % | DIASTOLIC BLOOD PRESSURE: 57 MMHG | RESPIRATION RATE: 16 BRPM | WEIGHT: 161.38 LBS | TEMPERATURE: 97 F | HEIGHT: 72 IN | BODY MASS INDEX: 21.86 KG/M2

## 2022-09-18 LAB
ALBUMIN SERPL BCP-MCNC: 3.2 G/DL (ref 3.5–5.2)
ALP SERPL-CCNC: 99 U/L (ref 55–135)
ALT SERPL W/O P-5'-P-CCNC: 96 U/L (ref 10–44)
ANION GAP SERPL CALC-SCNC: 6 MMOL/L (ref 8–16)
AST SERPL-CCNC: 78 U/L (ref 10–40)
BACTERIA UR CULT: NO GROWTH
BASOPHILS # BLD AUTO: 0 K/UL (ref 0–0.2)
BASOPHILS NFR BLD: 0 % (ref 0–1.9)
BILIRUB SERPL-MCNC: 6 MG/DL (ref 0.1–1)
BUN SERPL-MCNC: 24 MG/DL (ref 6–20)
CALCIUM SERPL-MCNC: 8.5 MG/DL (ref 8.7–10.5)
CHLORIDE SERPL-SCNC: 98 MMOL/L (ref 95–110)
CO2 SERPL-SCNC: 24 MMOL/L (ref 23–29)
CREAT SERPL-MCNC: 1 MG/DL (ref 0.5–1.4)
DIFFERENTIAL METHOD: ABNORMAL
EOSINOPHIL # BLD AUTO: 0.2 K/UL (ref 0–0.5)
EOSINOPHIL NFR BLD: 4.7 % (ref 0–8)
ERYTHROCYTE [DISTWIDTH] IN BLOOD BY AUTOMATED COUNT: 18.6 % (ref 11.5–14.5)
EST. GFR  (NO RACE VARIABLE): >60 ML/MIN/1.73 M^2
GLUCOSE SERPL-MCNC: 87 MG/DL (ref 70–110)
HCT VFR BLD AUTO: 26.1 % (ref 40–54)
HGB BLD-MCNC: 8.5 G/DL (ref 14–18)
IMM GRANULOCYTES # BLD AUTO: 0.01 K/UL (ref 0–0.04)
IMM GRANULOCYTES NFR BLD AUTO: 0.2 % (ref 0–0.5)
INR PPP: 1.6 (ref 0.8–1.2)
LYMPHOCYTES # BLD AUTO: 0.4 K/UL (ref 1–4.8)
LYMPHOCYTES NFR BLD: 8.2 % (ref 18–48)
MAGNESIUM SERPL-MCNC: 1.7 MG/DL (ref 1.6–2.6)
MCH RBC QN AUTO: 28.3 PG (ref 27–31)
MCHC RBC AUTO-ENTMCNC: 32.6 G/DL (ref 32–36)
MCV RBC AUTO: 87 FL (ref 82–98)
MONOCYTES # BLD AUTO: 0.8 K/UL (ref 0.3–1)
MONOCYTES NFR BLD: 16.3 % (ref 4–15)
NEUTROPHILS # BLD AUTO: 3.4 K/UL (ref 1.8–7.7)
NEUTROPHILS NFR BLD: 70.6 % (ref 38–73)
NRBC BLD-RTO: 0 /100 WBC
PHOSPHATE SERPL-MCNC: 2.6 MG/DL (ref 2.7–4.5)
PLATELET # BLD AUTO: 46 K/UL (ref 150–450)
PMV BLD AUTO: ABNORMAL FL (ref 9.2–12.9)
POTASSIUM SERPL-SCNC: 3.6 MMOL/L (ref 3.5–5.1)
PROT SERPL-MCNC: 5.5 G/DL (ref 6–8.4)
PROTHROMBIN TIME: 16.1 SEC (ref 9–12.5)
RBC # BLD AUTO: 3 M/UL (ref 4.6–6.2)
SODIUM SERPL-SCNC: 128 MMOL/L (ref 136–145)
WBC # BLD AUTO: 4.86 K/UL (ref 3.9–12.7)

## 2022-09-18 PROCEDURE — 84100 ASSAY OF PHOSPHORUS: CPT | Performed by: NURSE PRACTITIONER

## 2022-09-18 PROCEDURE — 83735 ASSAY OF MAGNESIUM: CPT | Performed by: NURSE PRACTITIONER

## 2022-09-18 PROCEDURE — 85025 COMPLETE CBC W/AUTO DIFF WBC: CPT | Performed by: NURSE PRACTITIONER

## 2022-09-18 PROCEDURE — 25000003 PHARM REV CODE 250

## 2022-09-18 PROCEDURE — 99239 PR HOSPITAL DISCHARGE DAY,>30 MIN: ICD-10-PCS | Mod: ,,, | Performed by: STUDENT IN AN ORGANIZED HEALTH CARE EDUCATION/TRAINING PROGRAM

## 2022-09-18 PROCEDURE — 99239 HOSP IP/OBS DSCHRG MGMT >30: CPT | Mod: ,,, | Performed by: STUDENT IN AN ORGANIZED HEALTH CARE EDUCATION/TRAINING PROGRAM

## 2022-09-18 PROCEDURE — 80053 COMPREHEN METABOLIC PANEL: CPT | Performed by: NURSE PRACTITIONER

## 2022-09-18 PROCEDURE — 85610 PROTHROMBIN TIME: CPT | Performed by: NURSE PRACTITIONER

## 2022-09-18 PROCEDURE — 36415 COLL VENOUS BLD VENIPUNCTURE: CPT | Performed by: NURSE PRACTITIONER

## 2022-09-18 PROCEDURE — 25000003 PHARM REV CODE 250: Performed by: HOSPITALIST

## 2022-09-18 PROCEDURE — 25000003 PHARM REV CODE 250: Performed by: STUDENT IN AN ORGANIZED HEALTH CARE EDUCATION/TRAINING PROGRAM

## 2022-09-18 RX ORDER — FUROSEMIDE 20 MG/1
20 TABLET ORAL DAILY
Qty: 30 TABLET | Refills: 11 | Status: SHIPPED | OUTPATIENT
Start: 2022-09-18 | End: 2023-09-18

## 2022-09-18 RX ORDER — SPIRONOLACTONE 50 MG/1
50 TABLET, FILM COATED ORAL DAILY
Qty: 90 TABLET | Refills: 2 | Status: SHIPPED | OUTPATIENT
Start: 2022-09-18 | End: 2022-12-17

## 2022-09-18 RX ADMIN — Medication 100 MG: at 08:09

## 2022-09-18 RX ADMIN — PANTOPRAZOLE SODIUM 40 MG: 40 TABLET, DELAYED RELEASE ORAL at 08:09

## 2022-09-18 RX ADMIN — LACTULOSE 20 G: 20 SOLUTION ORAL at 08:09

## 2022-09-18 RX ADMIN — FOLIC ACID 1 MG: 1 TABLET ORAL at 08:09

## 2022-09-18 RX ADMIN — RIFAXIMIN 550 MG: 550 TABLET ORAL at 08:09

## 2022-09-18 RX ADMIN — SULFAMETHOXAZOLE AND TRIMETHOPRIM 1 TABLET: 800; 160 TABLET ORAL at 08:09

## 2022-09-18 NOTE — ASSESSMENT & PLAN NOTE
Seen on 9/15. Patient with acute kidney injury likely multifactorial. Could be related to relative hypotension MAPs dropped by 10-15 overnight after pleurodesis. Could be related to overdiuresis coupled with emesis causing hypovolemia and pre-renal MAN. MAN is currently improving. Labs reviewed- Renal function/electrolytes with Estimated Creatinine Clearance: 105.7 mL/min (based on SCr of 1 mg/dL). according to latest data. Monitor urine output and serial BMP and adjust therapy as needed. Avoid nephrotoxins and renally dose meds for GFR listed above.   - Urine sodium was low indicating hypovolemic hyponatremia from emesis. Which also indicates pre-renal cause of MAN. MAN improved with fluids/albumin/midodrine 9/15. Discontinued midodrine 9/17 given improvement in renal function and BP  - Resume lasix 20mg and spironolactone 50mg daily on discharge

## 2022-09-18 NOTE — ASSESSMENT & PLAN NOTE
MELD-Na score: 25 at 9/18/2022  5:38 AM  MELD score: 18 at 9/18/2022  5:38 AM  Calculated from:  Serum Creatinine: 1.0 mg/dL at 9/18/2022  5:38 AM  Serum Sodium: 128 mmol/L at 9/18/2022  5:38 AM  Total Bilirubin: 6.0 mg/dL at 9/18/2022  5:38 AM  INR(ratio): 1.6 at 9/18/2022  5:38 AM  Age: 37 years  Secondary to alcoholic cirrhosis. Not a transplant candidate given etoh use last admission but patient's PETH is <10 indicating continued abstinence which bodes well for patient. He needs to continue AA meetings on discharge.  - holding home propranolol in setting of hypotension   - Restarted lasix/spironolactone on discharge. 20mg lasix and 50mg spironolactone daily  - Hepatology consulted

## 2022-09-18 NOTE — PROGRESS NOTES
[facility] - Telemetry Stepdown  Thoracic Surgery  Progress Note    Subjective:       Post-Op Info:  * No surgery found *         Interval History: No issues overnight, 10 cc overnight. On RA. CT with no air leak    Medications:  Continuous Infusions:  Scheduled Meds:   folic acid  1 mg Oral Daily    lactulose  20 g Oral TID    midodrine  10 mg Oral Q8H    pantoprazole  40 mg Oral Daily    rifAXImin  550 mg Oral BID    sodium chloride 0.9% 50 mL with bleomycin (BLEOCIN) 60 Units   Other Once    sulfamethoxazole-trimethoprim 800-160mg  1 tablet Oral Daily    thiamine  100 mg Oral Daily     PRN Meds:acetaminophen, aluminum & magnesium hydroxide-simethicone, methocarbamoL, ondansetron, oxyCODONE, oxyCODONE, sodium chloride 0.9%, traMADoL     Review of patient's allergies indicates:   Allergen Reactions    Ampicillin      Tolerated Zosyn 09/2022    Ceclor [cefaclor]      Tolerated Zosyn 09/2022     Objective:     Vital Signs (Most Recent):  Temp: 98.3 °F (36.8 °C) (09/17/22 0739)  Pulse: 68 (09/17/22 0739)  Resp: 18 (09/17/22 0739)  BP: (!) 103/54 (09/17/22 0739)  SpO2: 99 % (09/17/22 0739)   Vital Signs (24h Range):  Temp:  [98 °F (36.7 °C)-98.3 °F (36.8 °C)] 98.3 °F (36.8 °C)  Pulse:  [62-71] 68  Resp:  [16-25] 18  SpO2:  [97 %-99 %] 99 %  BP: ()/(54-59) 103/54     Weight: 73.2 kg (161 lb 6 oz)  Body mass index is 21.89 kg/m².    Intake/Output - Last 3 Shifts         09/15 0700  09/16 0659 09/16 0700  09/17 0659 09/17 0700  09/18 0659    P.O.  380     I.V. (mL/kg) 104.9 (1.4)      IV Piggyback 228.1      Total Intake(mL/kg) 333 (4.5) 380 (5.2)     Urine (mL/kg/hr) 0 (0) 850 (0.5)     Stool 0      Chest Tube 235 280     Total Output 235 1130     Net +98 -750            Urine Occurrence 5 x      Stool Occurrence 4 x              Physical Exam  Vitals and nursing note reviewed.   Cardiovascular:      Rate and Rhythm: Normal rate.      Pulses: Normal pulses.   Pulmonary:      Effort: Pulmonary effort is  normal. No respiratory distress.      Comments: R CT to suction, serous output, 10 cc overnight. No air leak  Abdominal:      General: There is no distension.      Palpations: Abdomen is soft.      Tenderness: There is no abdominal tenderness.       Significant Labs:  I have reviewed all pertinent lab results within the past 24 hours.  CBC:   Recent Labs   Lab 09/17/22  0539   WBC 7.99   RBC 3.43*   HGB 10.1*   HCT 30.1*   PLT 48*   MCV 88   MCH 29.4   MCHC 33.6     BMP:   Recent Labs   Lab 09/17/22  0539   GLU 77   *   K 3.9   CL 97   CO2 22*   BUN 33*   CREATININE 1.2   CALCIUM 8.9   MG 1.9       Significant Diagnostics:  I have reviewed all pertinent imaging results/findings within the past 24 hours.    Assessment/Plan:     * Acute hypoxemic respiratory failure  Persistent hepatic hydrothorax, s/p bedside bleomycin pleurodesis on 9/14/22. Clinical improvement since that time, remains on RA without SOB. R CT to WS, now minimal output. Removed this am at bedside.      - CT removed at bedside  - No further CTS intervention required  - Ok to ambulate, FLASH López MD  Thoracic Surgery  [facility] - Telemetry Stepdown

## 2022-09-18 NOTE — PLAN OF CARE
Patient to discharge home via wheelchair van.  Patient lives [place] hours away in [facility] waiting on transportation services to transport home at this time.  Patient alert and oriented x4, chest tube removed by MD at 0752 today.  Patient sating % on RA and in COVID isolation.  Patient received discharge instructions to remain in isolation for 7 more days.  Patient given discharge instructions for follow up appointments and pharmacy information for medication pick-up.  Patient tele monitoring removed and ready for discharge home.

## 2022-09-18 NOTE — ASSESSMENT & PLAN NOTE
RESOLVED, now on ROOM AIR  Patient presented with Hypoxic Respiratory failure which is Acute.  he is not on home oxygen. Supplemental oxygen was provided and noted-  .   Signs/symptoms of respiratory failure include- tachypnea and increased work of breathing. 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 Labs and images were reviewed. Patient Has not had a recent ABG.  Initial chest CT with bilateral patchy infiltrates consistent with Covid-19. Right plural effusion (suspect hepatohydrothorax) + pneumothorax (unclear if procedural complication) s/p chest tube placement at OSH. CT imaging concerning for chest tube in lung parenchyma vs lung re-expansion around tube. CTS evaluation with recs for stabilization prior to any intervention    - Finished remdes and dexamethasone  - Discontinued abx  - Output was improving from chest tube, but later worsened. Patient received pleurodesis to help reduce output and chest tube eventually removed 9/18.

## 2022-09-18 NOTE — PLAN OF CARE
[facility] - Telemetry Stepdown  Discharge Final Note    Primary Care Provider: [facility]    Expected Discharge Date: 9/18/2022    Final Discharge Note (most recent)       Final Note - 09/18/22 0952          Final Note    Assessment Type Final Discharge Note (P)      Anticipated Discharge Disposition Home or Self Care (P)         Post-Acute Status    Post-Acute Authorization Other;IV Infusion (P)      Other Status No Post-Acute Service Needs (P)    Transport set for 1030AM pu. *This is an est time.    Discharge Delays None known at this time (P)                      Important Message from Medicare             Contact Info       Laz Rodriguez    [facility]  [address]  [phone]       Next Steps: Follow up on 9/23/2022    Instructions: Established pt's hospital f/u visit @ 11:30am. Please bring discharge summary, ID, insurance card, and medication list.            Brooklyn Helm LMSW  Case Management Social Worker   [facility] [address]

## 2022-09-18 NOTE — DISCHARGE SUMMARY
[facility] - Telemetry [place] [facility] Medicine  Discharge Summary      Patient Name: Porfirio Wilson  MRN: [id number]  Patient Class: IP- Inpatient  Admission Date: 9/3/2022  Hospital Length of Stay: 15 days  Discharge Date and Time:  09/18/2022 12:22 PM  Attending Physician: No att. providers found   Discharging Provider: Taryn Linares MD  Primary Care Provider: [facility] Medicine Team: [facility] Taryn Linares MD    HPI:   Patient is a 36 y.o. male with significant past medical history of ETOH cirrhosis (complicated by presence of esophageal varices s/p band ligation), bipolar d/o and pancreatitis presented to [facility] on 9/3 complaining of shortness of breath x2 hours with associated fever/chills, cough and diaphoresis. Patient was intubated in the ED due to degree of respiratory distress. Cxry showed right pleural effusion and pneumothorax and focal consolidation on the left. He was found to be Covid positive. Patient had a right side chest tube placed. Labs at OSH significant for leukocytosis of 16, procal 0.46, lactic 5, , INR 2.1, negative troponin, ETOH negative and ammonia 86.  Pt was tx w/ levaquin & flagyl, as well as given IVF & vit K for coagulopathy associated w/ liver dz. Pt is currently requiring levo for map > 65. Last reported ETOH drink ~ 6 weeks ago.      Patient has been transferred to [facility] for higher level of care and Hepatology services.       * No surgery found *      Hospital Course:   Since transfer, chest tube drainage has been monitored. It has actually gone up 9/13-9/14 and currently plans are for bedside pleurodesis with CTS. Bedside pleurodesis performed 9/14. Hypotension/pain overnight following pleurodesis. Patient following morning reported feeling much better. MAN improved with fluids on 9/15. Patient with continued chest tube output, surgery placed CT to water seal on 9/17. Chest tube removed 9/18.        Goals of Care Treatment Preferences:  Code Status: Full Code      Consults:   Consults (From admission, onward)        Status Ordering Provider     Inpatient consult to Psychiatry  Once        Provider:  (Not yet assigned)    Completed SO HAN     Inpatient consult to Urology  Once        Provider:  (Not yet assigned)    Completed CHAVA VICTORIA     Inpatient consult to Cardiothoracic Surgery  Once        Provider:  (Not yet assigned)    Completed CHAVA VICTORIA     Inpatient consult to Hepatology  Once        Provider:  (Not yet assigned)    Completed CHAVA VICTORIA          * Acute hypoxemic respiratory failure  RESOLVED, now on ROOM AIR  Patient presented with Hypoxic Respiratory failure which is Acute.  he is not on home oxygen. Supplemental oxygen was provided and noted-  .   Signs/symptoms of respiratory failure include- tachypnea and increased work of breathing. 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 Labs and images were reviewed. Patient Has not had a recent ABG.  Initial chest CT with bilateral patchy infiltrates consistent with Covid-19. Right plural effusion (suspect hepatohydrothorax) + pneumothorax (unclear if procedural complication) s/p chest tube placement at OSH. CT imaging concerning for chest tube in lung parenchyma vs lung re-expansion around tube. CTS evaluation with recs for stabilization prior to any intervention    - Finished remdes and dexamethasone  - Discontinued abx  - Output was improving from chest tube, but later worsened. Patient received pleurodesis to help reduce output and chest tube eventually removed 9/18.    MAN (acute kidney injury)  Seen on 9/15. Patient with acute kidney injury likely multifactorial. Could be related to relative hypotension MAPs dropped by 10-15 overnight after pleurodesis. Could be related to overdiuresis coupled with emesis causing hypovolemia and pre-renal MAN. MAN is currently improving. Labs reviewed-  Renal function/electrolytes with Estimated Creatinine Clearance: 105.7 mL/min (based on SCr of 1 mg/dL). according to latest data. Monitor urine output and serial BMP and adjust therapy as needed. Avoid nephrotoxins and renally dose meds for GFR listed above.   - Urine sodium was low indicating hypovolemic hyponatremia from emesis. Which also indicates pre-renal cause of MAN. MAN improved with fluids/albumin/midodrine 9/15. Discontinued midodrine 9/17 given improvement in renal function and BP  - Resume lasix 20mg and spironolactone 50mg daily on discharge    Decompensated hepatic cirrhosis  MELD-Na score: 25 at 9/18/2022  5:38 AM  MELD score: 18 at 9/18/2022  5:38 AM  Calculated from:  Serum Creatinine: 1.0 mg/dL at 9/18/2022  5:38 AM  Serum Sodium: 128 mmol/L at 9/18/2022  5:38 AM  Total Bilirubin: 6.0 mg/dL at 9/18/2022  5:38 AM  INR(ratio): 1.6 at 9/18/2022  5:38 AM  Age: 37 years  Secondary to alcoholic cirrhosis. Not a transplant candidate given etoh use last admission but patient's PETH is <10 indicating continued abstinence which bodes well for patient. He needs to continue AA meetings on discharge.  - holding home propranolol in setting of hypotension   - Restarted lasix/spironolactone on discharge. 20mg lasix and 50mg spironolactone daily  - Hepatology consulted        Final Active Diagnoses:    Diagnosis Date Noted POA    PRINCIPAL PROBLEM:  Acute hypoxemic respiratory failure [J96.01] 09/03/2022 Yes    MAN (acute kidney injury) [N17.9] 09/15/2022 No    Alcohol use disorder, severe, dependence [F10.20] 09/12/2022 Yes     Chronic    Oropharyngeal bleeding [J39.2] 09/04/2022 No    Seizure [R56.9] 09/03/2022 Yes    Decompensated hepatic cirrhosis [K72.90, K74.60] 08/05/2022 Yes      Problems Resolved During this Admission:    Diagnosis Date Noted Date Resolved POA    Urinary retention [R33.9] 09/05/2022 09/16/2022 Yes       Discharged Condition: stable    Disposition: Home or Self Care    Follow Up:    Follow-up Information     Laz Rodriguez Follow up on 9/23/2022.    Why: Established pt's hospital f/u visit @ 11:30am. Please bring discharge summary, ID, insurance card, and medication list.  Contact information:  [facility]  [address]   [phone]                     Patient Instructions:   No discharge procedures on file.    Significant Diagnostic Studies: Labs:   CMP   Recent Labs   Lab 09/17/22  0539 09/18/22  0538   * 128*   K 3.9 3.6   CL 97 98   CO2 22* 24   GLU 77 87   BUN 33* 24*   CREATININE 1.2 1.0   CALCIUM 8.9 8.5*   PROT 6.2 5.5*   ALBUMIN 3.6 3.2*   BILITOT 5.2* 6.0*   ALKPHOS 104 99   AST 78* 78*   * 96*   ANIONGAP 8 6*    and CBC   Recent Labs   Lab 09/17/22  0539 09/18/22  0538   WBC 7.99 4.86   HGB 10.1* 8.5*   HCT 30.1* 26.1*   PLT 48* 46*     Radiology: CT Chest 9/8/22  Impression:     1.  Mild increase in volume of moderate right pleural effusion.  A right thoracostomy tube is predominantly in the right sagittal fissure.  2.  Subtotal atelectasis of the left lower lobe is out of proportion to the trace volume of left pleural fluid.  Mucoid impaction may be playing a role and atelectasis.  3.  Cirrhosis with decompensated portal hypertension is evidence by splenomegaly, collateral circulation, and small volume ascites.  4.  Interval improvement in left greater than right consolidation; there are residual ground-glass opacities in the left lung.      Pending Diagnostic Studies:     None         Medications:  Reconciled Home Medications:      Medication List      START taking these medications    furosemide 20 MG tablet  Commonly known as: LASIX  Take 1 tablet (20 mg total) by mouth once daily.     rifAXIMin 550 mg Tab  Commonly known as: XIFAXAN  Take 1 tablet (550 mg total) by mouth 2 (two) times daily.        CHANGE how you take these medications    spironolactone 50 MG tablet  Commonly known as: ALDACTONE  Take 1 tablet (50 mg total) by  mouth once daily.  What changed:   · medication strength  · how much to take        CONTINUE taking these medications    CONSTULOSE 10 gram/15 mL solution  Generic drug: lactulose  Take 15 mLs (10 g total) by mouth 3 (three) times daily. TITRATE TO 3-4 BOWEL MOVEMENTS DAILY.     ferrous sulfate 325 (65 FE) MG EC tablet  Take 1 tablet (325 mg total) by mouth once daily.     folic acid 1 MG tablet  Commonly known as: FOLVITE  Take 1 tablet (1 mg total) by mouth once daily.     thiamine 100 MG tablet  Take 1 tablet (100 mg total) by mouth once daily.        STOP taking these medications    propranoloL 10 MG tablet  Commonly known as: INDERAL            Indwelling Lines/Drains at time of discharge:   Lines/Drains/Airways     None                 Time spent on the discharge of patient: 35 minutes         Taryn Linares MD  Department of Hospital Medicine  [facility] - Telemetry Stepdown

## 2022-09-19 ENCOUNTER — PATIENT OUTREACH (OUTPATIENT)
Dept: ADMINISTRATIVE | Facility: CLINIC | Age: 37
End: 2022-09-19
Payer: MEDICAID

## 2022-09-19 NOTE — PROGRESS NOTES
C3 nurse attempted to contact Porfirio Wilson  for a TCC post hospital discharge follow up call. No answer. The patient does not have a scheduled HOSFU appointment, and the pt does not have an [facility] PCP.

## 2022-09-19 NOTE — PROGRESS NOTES
C3 Nurse made second attempt to reach patient for TCC call. Phone continues to ring, no voicemail option.

## 2022-09-20 LAB
BACTERIA BLD CULT: NORMAL
BACTERIA BLD CULT: NORMAL

## 2022-10-23 LAB
ACID FAST MOD KINY STN SPEC: NORMAL
MYCOBACTERIUM SPEC QL CULT: NORMAL

## 2022-12-19 PROBLEM — N17.9 AKI (ACUTE KIDNEY INJURY): Status: RESOLVED | Noted: 2022-09-15 | Resolved: 2022-12-19

## 2024-10-22 NOTE — CONSULTS
[facility]  Hepatology  Consult Note    Patient Name: Porfirio Wilson  MRN: [id number]  Admission Date: 9/3/2022  Hospital Length of Stay: 1 days  Code Status: Full Code   Attending Provider: Tisha Castro DO   Consulting Provider: Ricardo Alonzo MD  Primary Care Physician: Primary Doctor No  Principal Problem:Acute hypoxemic respiratory failure    Inpatient consult to Hepatology  Consult performed by: Ricardo Alonzo MD  Consult ordered by: Becca Perez NP      Subjective:     HPI:   Mr Wilson is a 35yo PMHx decompensated EtOH cirrhosis (HE, no ascites) c/b reported varices--not followed here, pancreatitis presents as transfer 09/04 for hepatology eval.    Initially presented to OSH on 09/03 w/ complaints of SOB and fevers/ chills. CXR demonstrated R pleural eff and pneumothorax, focal consolidation. COVID positive. Leukocytosis 16k, LA 5. R sided CT placed. Initiated on Abx. On levo. Intubated.    VS notable for levo 0.08, intubated.   CBC w/o leukocytosis, Hgb stable around 10, plts 60s.   BMP w/ Na 134, BUN/ Cr 13/ 0.8.   LFTs T bili 7.8-->8.2, ALP wnl, AST/ / 37-->79/ 34  LA 2.7.    U/A w/o UTI  Bcx pending, thora pending    CT CAP w/o contrast w/ b/l pulm infiltrates L>R, R large effusion, varices, no ascites, GB distention 2/to changes from liver or acalculous cholecystitits    Receiving lactulose, remdesivir, levofloxacin, steroids    Past Medical History:   Diagnosis Date    GERD (gastroesophageal reflux disease)     History of alcohol abuse        Past Surgical History:   Procedure Laterality Date    EGD, WITH BANDING OF VARICES         Family History   Family history unknown: Yes       Social History     Socioeconomic History    Marital status: Single   Tobacco Use    Smoking status: Every Day     Types: Cigarettes    Smokeless tobacco: Never   Substance and Sexual Activity    Alcohol use: Yes     Comment: pint of vodka every day     Drug use: Yes     Types: Marijuana     Sexual activity: Not Currently       No current facility-administered medications on file prior to encounter.     Current Outpatient Medications on File Prior to Encounter   Medication Sig Dispense Refill    ferrous sulfate 325 (65 FE) MG EC tablet Take 1 tablet (325 mg total) by mouth once daily. 30 tablet 2    folic acid (FOLVITE) 1 MG tablet Take 1 tablet (1 mg total) by mouth once daily. 30 tablet 2    lactulose (CHRONULAC) 10 gram/15 mL solution Take 15 mLs (10 g total) by mouth 3 (three) times daily. TITRATE TO 3-4 BOWEL MOVEMENTS DAILY. 1350 mL 2    propranoloL (INDERAL) 10 MG tablet Take 1 tablet (10 mg total) by mouth 2 (two) times daily. 60 tablet 2    thiamine 100 MG tablet Take 1 tablet (100 mg total) by mouth once daily. 30 tablet 2       Review of patient's allergies indicates:   Allergen Reactions    Ampicillin     Ceclor [cefaclor]     Penicillins        Review of Systems   Unable to perform ROS: Intubated      Objective:     Vitals:    09/04/22 0715   BP: (!) 103/57   Pulse: (!) 53   Resp: 20   Temp: 96.4 °F (35.8 °C)       Constitutional: intubated sedated  HENT: Head: Normal, normocephalic, atraumatic.  Eyes: sclera icteric  Cardiovascular: regular rate and rhythm  Respiratory: intubated  GI: soft, non-tender, without masses or organomegaly  Musculoskeletal: no muscular tenderness noted  Skin: jaundice present  Neurological: intubated sedated    Significant Labs:  Recent Labs   Lab 09/03/22  2358 09/04/22  0338   HGB 9.5* 10.0*       Lab Results   Component Value Date    WBC 10.06 09/04/2022    HGB 10.0 (L) 09/04/2022    HCT 31.5 (L) 09/04/2022    MCV 93 09/04/2022    PLT 75 (L) 09/04/2022       Lab Results   Component Value Date     (L) 09/04/2022    K 3.4 (L) 09/04/2022     09/04/2022    CO2 21 (L) 09/04/2022    BUN 13 09/04/2022    CREATININE 0.8 09/04/2022    CALCIUM 7.9 (L) 09/04/2022    ANIONGAP 7 (L) 09/04/2022       Lab Results   Component Value Date    ALT 34 09/04/2022    AST  79 (H) 09/04/2022    ALKPHOS 111 09/04/2022    BILITOT 8.2 (H) 09/04/2022       Lab Results   Component Value Date    INR 1.8 (H) 09/04/2022    INR 1.8 (H) 09/03/2022    INR 1.9 (H) 08/08/2022       Significant Imaging:  Reviewed pertinent radiology findings.       Assessment/Plan:     37yo PMHx decompensated EtOH cirrhosis (HE, no ascites) c/b reported varices--not followed here, pancreatitis presents as transfer 09/04 for hepatology eval.    With COVID and chest tube for PTX/ pleural effusion, pressor requirement, intubated.    Diagnostic paracentesis negaive    MELD-Na 23    Too sick to be considered for transplant at this time especially with COVID diagnosis.     Problem List:  Decompensated EtOH cirrhosis (HE)    Recommendations:  - Daily CMP, INR  - Lactulose to titrate 3-4 BM/d  - Consider US to further evaluate GB given concern for acalculous cholecystitis    Thank you for involving us in the care of Porfirio Wilson. Please call with any additional questions, concerns or changes in the patient's clinical status. We will continue to follow.    Ricardo Alonzo MD  Gastroenterology Fellow PGY V  [facility]              Is The Wound New Or Recurrent?: new How Is Your Wound Healing?: healing slowly Is This A New Presentation, Or A Follow-Up?: Wound